# Patient Record
Sex: MALE | Race: WHITE | Employment: OTHER | URBAN - METROPOLITAN AREA
[De-identification: names, ages, dates, MRNs, and addresses within clinical notes are randomized per-mention and may not be internally consistent; named-entity substitution may affect disease eponyms.]

---

## 2017-01-16 ENCOUNTER — APPOINTMENT (OUTPATIENT)
Dept: GENERAL RADIOLOGY | Facility: CLINIC | Age: 82
DRG: 871 | End: 2017-01-16
Attending: EMERGENCY MEDICINE
Payer: MEDICARE

## 2017-01-16 ENCOUNTER — HOSPITAL ENCOUNTER (INPATIENT)
Facility: CLINIC | Age: 82
LOS: 5 days | Discharge: HOME | DRG: 871 | End: 2017-01-21
Attending: EMERGENCY MEDICINE | Admitting: INTERNAL MEDICINE
Payer: MEDICARE

## 2017-01-16 DIAGNOSIS — N39.0 URINARY TRACT INFECTION, SITE UNSPECIFIED: ICD-10-CM

## 2017-01-16 DIAGNOSIS — J18.9 PNEUMONIA OF RIGHT LOWER LOBE DUE TO INFECTIOUS ORGANISM: ICD-10-CM

## 2017-01-16 PROBLEM — R35.0 URINARY FREQUENCY: Status: ACTIVE | Noted: 2017-01-16
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
## 2017-01-16 LAB
ALBUMIN SERPL-MCNC: 2.9 G/DL (ref 3.4–5)
ALBUMIN UR-MCNC: 30 MG/DL
ALP SERPL-CCNC: 47 U/L (ref 40–150)
ALT SERPL W P-5'-P-CCNC: 14 U/L (ref 0–70)
ANION GAP SERPL CALCULATED.3IONS-SCNC: 10 MMOL/L (ref 3–14)
APPEARANCE UR: ABNORMAL
APTT PPP: 40 SEC (ref 22–37)
AST SERPL W P-5'-P-CCNC: 23 U/L (ref 0–45)
BACTERIA #/AREA URNS HPF: ABNORMAL /HPF
BASE DEFICIT BLDV-SCNC: 0.3 MMOL/L
BASOPHILS # BLD AUTO: 0 10E9/L (ref 0–0.2)
BASOPHILS NFR BLD AUTO: 0.2 %
BILIRUB SERPL-MCNC: 1.1 MG/DL (ref 0.2–1.3)
BILIRUB UR QL STRIP: NEGATIVE
BUN SERPL-MCNC: 32 MG/DL (ref 7–30)
CALCIUM SERPL-MCNC: 8.2 MG/DL (ref 8.5–10.1)
CHLORIDE SERPL-SCNC: 106 MMOL/L (ref 94–109)
CO2 BLD-SCNC: 22 MMOL/L (ref 21–28)
CO2 SERPL-SCNC: 24 MMOL/L (ref 20–32)
COLOR UR AUTO: YELLOW
CREAT SERPL-MCNC: 1.28 MG/DL (ref 0.66–1.25)
DIFFERENTIAL METHOD BLD: ABNORMAL
EOSINOPHIL # BLD AUTO: 0 10E9/L (ref 0–0.7)
EOSINOPHIL NFR BLD AUTO: 0.1 %
ERYTHROCYTE [DISTWIDTH] IN BLOOD BY AUTOMATED COUNT: 18.2 % (ref 10–15)
GFR SERPL CREATININE-BSD FRML MDRD: 54 ML/MIN/1.7M2
GLUCOSE SERPL-MCNC: 116 MG/DL (ref 70–99)
GLUCOSE UR STRIP-MCNC: NEGATIVE MG/DL
HCO3 BLDV-SCNC: 24 MMOL/L (ref 21–28)
HCT VFR BLD AUTO: 28.8 % (ref 40–53)
HGB BLD-MCNC: 9.3 G/DL (ref 13.3–17.7)
HGB UR QL STRIP: ABNORMAL
IMM GRANULOCYTES # BLD: 0 10E9/L (ref 0–0.4)
IMM GRANULOCYTES NFR BLD: 0.3 %
INR PPP: 1.25 (ref 0.86–1.14)
KETONES UR STRIP-MCNC: NEGATIVE MG/DL
LACTATE BLD-SCNC: 0.9 MMOL/L (ref 0.7–2.1)
LACTATE BLD-SCNC: 1.2 MMOL/L (ref 0.7–2.1)
LEUKOCYTE ESTERASE UR QL STRIP: ABNORMAL
LYMPHOCYTES # BLD AUTO: 2.5 10E9/L (ref 0.8–5.3)
LYMPHOCYTES NFR BLD AUTO: 17.8 %
MCH RBC QN AUTO: 31.7 PG (ref 26.5–33)
MCHC RBC AUTO-ENTMCNC: 32.3 G/DL (ref 31.5–36.5)
MCV RBC AUTO: 98 FL (ref 78–100)
MONOCYTES # BLD AUTO: 1.1 10E9/L (ref 0–1.3)
MONOCYTES NFR BLD AUTO: 7.5 %
MUCOUS THREADS #/AREA URNS LPF: PRESENT /LPF
NEUTROPHILS # BLD AUTO: 10.5 10E9/L (ref 1.6–8.3)
NEUTROPHILS NFR BLD AUTO: 74.1 %
NITRATE UR QL: POSITIVE
NRBC # BLD AUTO: 0 10*3/UL
NRBC BLD AUTO-RTO: 0 /100
O2/TOTAL GAS SETTING VFR VENT: ABNORMAL %
PCO2 BLD: 33 MM HG (ref 35–45)
PCO2 BLDV: 35 MM HG (ref 40–50)
PH BLD: 7.43 PH (ref 7.35–7.45)
PH BLDV: 7.44 PH (ref 7.32–7.43)
PH UR STRIP: 6.5 PH (ref 5–7)
PLATELET # BLD AUTO: 226 10E9/L (ref 150–450)
PO2 BLD: 58 MM HG (ref 80–105)
PO2 BLDV: 167 MM HG (ref 25–47)
POTASSIUM SERPL-SCNC: 4 MMOL/L (ref 3.4–5.3)
PROT SERPL-MCNC: 6.7 G/DL (ref 6.8–8.8)
RBC # BLD AUTO: 2.93 10E12/L (ref 4.4–5.9)
RBC #/AREA URNS AUTO: 6 /HPF (ref 0–2)
SAO2 % BLDA FROM PO2: 91 % (ref 92–100)
SODIUM SERPL-SCNC: 140 MMOL/L (ref 133–144)
SP GR UR STRIP: 1.02 (ref 1–1.03)
URN SPEC COLLECT METH UR: ABNORMAL
UROBILINOGEN UR STRIP-MCNC: NORMAL MG/DL (ref 0–2)
WBC # BLD AUTO: 14.1 10E9/L (ref 4–11)
WBC #/AREA URNS AUTO: >182 /HPF (ref 0–2)

## 2017-01-16 PROCEDURE — 82803 BLOOD GASES ANY COMBINATION: CPT | Performed by: EMERGENCY MEDICINE

## 2017-01-16 PROCEDURE — 87088 URINE BACTERIA CULTURE: CPT | Performed by: EMERGENCY MEDICINE

## 2017-01-16 PROCEDURE — 87186 SC STD MICRODIL/AGAR DIL: CPT | Performed by: EMERGENCY MEDICINE

## 2017-01-16 PROCEDURE — 80053 COMPREHEN METABOLIC PANEL: CPT | Performed by: EMERGENCY MEDICINE

## 2017-01-16 PROCEDURE — 87804 INFLUENZA ASSAY W/OPTIC: CPT | Performed by: EMERGENCY MEDICINE

## 2017-01-16 PROCEDURE — 96365 THER/PROPH/DIAG IV INF INIT: CPT

## 2017-01-16 PROCEDURE — 99285 EMERGENCY DEPT VISIT HI MDM: CPT | Mod: 25

## 2017-01-16 PROCEDURE — 96368 THER/DIAG CONCURRENT INF: CPT

## 2017-01-16 PROCEDURE — 25000125 ZZHC RX 250: Performed by: EMERGENCY MEDICINE

## 2017-01-16 PROCEDURE — 99223 1ST HOSP IP/OBS HIGH 75: CPT | Mod: AI | Performed by: INTERNAL MEDICINE

## 2017-01-16 PROCEDURE — 12000000 ZZH R&B MED SURG/OB

## 2017-01-16 PROCEDURE — 81001 URINALYSIS AUTO W/SCOPE: CPT | Performed by: EMERGENCY MEDICINE

## 2017-01-16 PROCEDURE — 96366 THER/PROPH/DIAG IV INF ADDON: CPT

## 2017-01-16 PROCEDURE — 85025 COMPLETE CBC W/AUTO DIFF WBC: CPT | Performed by: EMERGENCY MEDICINE

## 2017-01-16 PROCEDURE — 25000128 H RX IP 250 OP 636: Performed by: EMERGENCY MEDICINE

## 2017-01-16 PROCEDURE — 85730 THROMBOPLASTIN TIME PARTIAL: CPT | Performed by: EMERGENCY MEDICINE

## 2017-01-16 PROCEDURE — 82803 BLOOD GASES ANY COMBINATION: CPT

## 2017-01-16 PROCEDURE — 83605 ASSAY OF LACTIC ACID: CPT | Performed by: EMERGENCY MEDICINE

## 2017-01-16 PROCEDURE — 87086 URINE CULTURE/COLONY COUNT: CPT | Performed by: EMERGENCY MEDICINE

## 2017-01-16 PROCEDURE — 87040 BLOOD CULTURE FOR BACTERIA: CPT | Performed by: EMERGENCY MEDICINE

## 2017-01-16 PROCEDURE — 93005 ELECTROCARDIOGRAM TRACING: CPT

## 2017-01-16 PROCEDURE — 96367 TX/PROPH/DG ADDL SEQ IV INF: CPT

## 2017-01-16 PROCEDURE — 83605 ASSAY OF LACTIC ACID: CPT

## 2017-01-16 PROCEDURE — 71010 XR CHEST PORT 1 VW: CPT

## 2017-01-16 PROCEDURE — 36415 COLL VENOUS BLD VENIPUNCTURE: CPT | Performed by: EMERGENCY MEDICINE

## 2017-01-16 PROCEDURE — 85610 PROTHROMBIN TIME: CPT | Performed by: EMERGENCY MEDICINE

## 2017-01-16 RX ORDER — BISACODYL 10 MG
10 SUPPOSITORY, RECTAL RECTAL DAILY PRN
COMMUNITY
End: 2019-01-01

## 2017-01-16 RX ORDER — SODIUM CHLORIDE 9 MG/ML
1000 INJECTION, SOLUTION INTRAVENOUS CONTINUOUS
Status: DISCONTINUED | OUTPATIENT
Start: 2017-01-16 | End: 2017-01-17

## 2017-01-16 RX ORDER — LEVOFLOXACIN 5 MG/ML
750 INJECTION, SOLUTION INTRAVENOUS ONCE
Status: COMPLETED | OUTPATIENT
Start: 2017-01-17 | End: 2017-01-17

## 2017-01-16 RX ADMIN — TAZOBACTAM SODIUM AND PIPERACILLIN SODIUM 4.5 G: 500; 4 INJECTION, SOLUTION INTRAVENOUS at 23:37

## 2017-01-16 RX ADMIN — SODIUM CHLORIDE 1000 ML: 9 INJECTION, SOLUTION INTRAVENOUS at 23:37

## 2017-01-16 NOTE — IP AVS SNAPSHOT
MRN:[id number]                      After Visit Summary   1/16/2017    Eder Pearson    MRN: [id number]           Thank you!     Thank you for choosing [facility] for your care. Our goal is always to provide you with excellent care. Hearing back from our patients is one way we can continue to improve our services. Please take a few minutes to complete the written survey that you may receive in the mail after you visit. If you would like to speak to someone directly about your visit please contact Patient Relations at [phone]. Thank you!          Patient Information     Date Of Birth          7/9/1933        About your hospital stay     You were admitted on:  January 16, 2017 You last received care in the:  [address] [facility]    You were discharged on:  January 21, 2017        Reason for your hospital stay       You were admitted for urinary infection and likely pneumonia.  We have treated this with IV antibiotics and now you have two more days of oral antibiotics to complete this course.                  Who to Call     For medical emergencies, please call 911.  For non-urgent questions about your medical care, please call your primary care provider or clinic, None          Attending Provider     Provider    Any Dobbs MD Sebring, Daniel L, MD       Primary Care Provider Fax #    [facility] [phone]       [address] [place] [address]        After Care Instructions     Activity - Up with nursing assistance       Assist of 2 with use of assistive devices as needed.            Advance Diet as Tolerated       Follow this diet upon discharge: Orders Placed This Encounter  Combination Diet Regular Diet Adult            Discharge Instructions       Please give seroquel right AFTER meals, not before so as to prevent sedation.            Fall precautions           General info for SNF       Length of Stay Estimate:  "Long Term Care  Condition at Discharge: Stable  Level of care:skilled   Rehabilitation Potential: Poor  Admission H&P remains valid and up-to-date: Yes  Recent Chemotherapy: N/A  Use Nursing Home Standing Orders: Yes            Mantoux instructions       Give two-step Mantoux (PPD) Per Facility Policy Yes                  Follow-up Appointments     Follow Up and recommended labs and tests       Follow up with Nursing home physician for re-evaluation.                  Additional Services     Occupational Therapy Adult Consult       Evaluate and treat as clinically indicated.    Reason:  Deconditioning            Physical Therapy Adult Consult       Evaluate and treat as clinically indicated.    Reason:  Deconditioning                  Future tests that were ordered for you     Jodie Lift       DME Hydraulic Jodie Lift                  Pending Results     Date and Time Order Name Status Description    1/16/2017 2243 Blood culture Preliminary     1/16/2017 2236 Blood culture Preliminary             Statement of Approval     Ordered          01/21/17 1033  I have reviewed and agree with all the recommendations and orders detailed in this document.   EFFECTIVE NOW     Approved and electronically signed by:  Cortes Anna MD           01/20/17 6149  I have reviewed and agree with all the recommendations and orders detailed in this document.   EFFECTIVE NOW     Approved and electronically signed by:  Cortes Anna MD             Admission Information        Provider Department Dept Phone    1/16/2017 Rudy Grey MD  [facility] [phone]      Your Vitals Were     Blood Pressure Temperature Respirations    154/74 mmHg 98.2  F (36.8  C) (Oral) 18    Height Weight BMI (Body Mass Index)    1.727 m (5' 8\") 85.957 kg (189 lb 8 oz) 28.82 kg/m2    Pulse Oximetry          97%        MyChart Information     [facility] lets you send messages to your doctor, view your test results, renew " "your prescriptions, schedule appointments and more. To sign up, go to [email or web address] . Click on \"Log in\" on the left side of the screen, which will take you to the Welcome page. Then click on \"Sign up Now\" on the right side of the page.     You will be asked to enter the access code listed below, as well as some personal information. Please follow the directions to create your username and password.     Your access code is: [email or web address]  Expires: 3/4/2017 10:57 PM     Your access code will  in 90 days. If you need help or a new code, please call your [facility] clinic or [phone].        Care EveryWhere ID     This is your Care EveryWhere ID. This could be used by other organizations to access your [facility] medical records  [id number]           Review of your medicines      START taking        Dose / Directions    levofloxacin 500 MG tablet   Commonly known as:  LEVAQUIN   Used for:  Urinary tract infection, site unspecified, Pneumonia of right lower lobe due to infectious organism        Dose:  500 mg   Start taking on:  2017   Take 1 tablet (500 mg) by mouth daily for 2 days   Quantity:  2 tablet   Refills:  0         CONTINUE these medicines which have NOT CHANGED        Dose / Directions    * acetaminophen 500 MG tablet   Commonly known as:  TYLENOL        Dose:  1000 mg   Take 1,000 mg by mouth 2 times daily   Refills:  0       * acetaminophen 500 MG tablet   Commonly known as:  TYLENOL        Dose:  1000 mg   Take 1,000 mg by mouth daily as needed for mild pain   Refills:  0       bisacodyl 10 MG Suppository   Commonly known as:  DULCOLAX        Dose:  10 mg   Place 10 mg rectally daily as needed for constipation   Refills:  0       DULoxetine 30 MG EC capsule   Commonly known as:  CYMBALTA        Dose:  30 mg   Take 30 mg by mouth daily   Refills:  0       FLOMAX 0.4 MG capsule   Generic drug:  tamsulosin        Dose:  0.4 mg   Take 0.4 mg by mouth At Bedtime   Refills:  0       " menthol-zinc oxide 0.44-20.625 % Oint ointment   Commonly known as:  CALMOSEPTINE        Apply topically every 8 hours as needed for skin protection   Refills:  0       miconazole with skin protectant 2 % Crea cream        Apply topically every 8 hours Apply to buttocks topically every shift for barrier cream with each toileting   Refills:  0       NAMENDA PO        Dose:  10 mg   Take 10 mg by mouth daily   Refills:  0       NATURAL FIBER PO        Dose:  1 tsp.   Take 1 tsp. by mouth daily   Refills:  0       phenylephrine-shark liver oil-mineral oil-petrolatum 0.25-14-74.9 % rectal ointment   Commonly known as:  PREPARATION H        Place rectally daily as needed for hemorrhoids   Refills:  0       * QUETIAPINE FUMARATE PO        Dose:  12.5 mg   Take 12.5 mg by mouth daily (with lunch)   Refills:  0       * SEROQUEL 25 MG tablet   Generic drug:  QUEtiapine        Dose:  12.5 mg   Take 12.5 mg by mouth daily (with dinner)   Refills:  0       * SEROQUEL 25 MG tablet   Generic drug:  QUEtiapine        Dose:  25 mg   Take 25 mg by mouth At Bedtime   Refills:  0       * SEROQUEL 25 MG tablet   Generic drug:  QUEtiapine        Dose:  25 mg   Take 25 mg by mouth every morning   Refills:  0       * SEROQUEL 25 MG tablet   Generic drug:  QUEtiapine        Dose:  12.5 mg   Take 12.5 mg by mouth every 12 hours as needed   Refills:  0       senna-docusate 8.6-50 MG per tablet   Commonly known as:  SENOKOT-S;PERICOLACE        Dose:  1 tablet   Take 1 tablet by mouth every 12 hours as needed for constipation   Refills:  0       * sodium chloride 0.65 % nasal spray   Commonly known as:  OCEAN        Dose:  2 spray   Spray 2 sprays into both nostrils 2 times daily   Refills:  0       * sodium chloride 0.65 % nasal spray   Commonly known as:  OCEAN        Dose:  2 spray   Spray 2 sprays into both nostrils every 8 hours as needed for congestion   Refills:  0       VITAMIN B 12 PO        Dose:  1000 mcg   Take 1,000 mcg by mouth  daily   Refills:  0       VITAMIN D3 PO        Dose:  5000 Units   Take 5,000 Units by mouth daily   Refills:  0       * Notice:  This list has 9 medication(s) that are the same as other medications prescribed for you. Read the directions carefully, and ask your doctor or other care provider to review them with you.         Where to get your medicines      Some of these will need a paper prescription and others can be bought over the counter. Ask your nurse if you have questions.     Bring a paper prescription for each of these medications    - levofloxacin 500 MG tablet             Protect others around you: Learn how to safely use, store and throw away your medicines at www.disposemymeds.org.             Medication List: This is a list of all your medications and when to take them. Check marks below indicate your daily home schedule. Keep this list as a reference.      Medications           Morning Afternoon Evening Bedtime As Needed    * acetaminophen 500 MG tablet   Commonly known as:  TYLENOL   Take 1,000 mg by mouth 2 times daily   Last time this was given:  1,000 mg on 1/21/2017  9:26 AM                                * acetaminophen 500 MG tablet   Commonly known as:  TYLENOL   Take 1,000 mg by mouth daily as needed for mild pain   Last time this was given:  1,000 mg on 1/21/2017  9:26 AM                                bisacodyl 10 MG Suppository   Commonly known as:  DULCOLAX   Place 10 mg rectally daily as needed for constipation                                DULoxetine 30 MG EC capsule   Commonly known as:  CYMBALTA   Take 30 mg by mouth daily   Last time this was given:  30 mg on 1/21/2017  9:27 AM                                FLOMAX 0.4 MG capsule   Take 0.4 mg by mouth At Bedtime   Last time this was given:  0.4 mg on 1/20/2017  9:12 PM   Generic drug:  tamsulosin                                levofloxacin 500 MG tablet   Commonly known as:  LEVAQUIN   Take 1 tablet (500 mg) by mouth daily for 2  days   Start taking on:  1/22/2017                                menthol-zinc oxide 0.44-20.625 % Oint ointment   Commonly known as:  CALMOSEPTINE   Apply topically every 8 hours as needed for skin protection                                miconazole with skin protectant 2 % Crea cream   Apply topically every 8 hours Apply to buttocks topically every shift for barrier cream with each toileting                                NAMENDA PO   Take 10 mg by mouth daily   Last time this was given:  10 mg on 1/21/2017  9:26 AM                                NATURAL FIBER PO   Take 1 tsp. by mouth daily                                phenylephrine-shark liver oil-mineral oil-petrolatum 0.25-14-74.9 % rectal ointment   Commonly known as:  PREPARATION H   Place rectally daily as needed for hemorrhoids                                * QUETIAPINE FUMARATE PO   Take 12.5 mg by mouth daily (with lunch)   Last time this was given:  25 mg on 1/21/2017  9:26 AM                                * SEROQUEL 25 MG tablet   Take 12.5 mg by mouth daily (with dinner)   Last time this was given:  25 mg on 1/21/2017  9:26 AM   Generic drug:  QUEtiapine                                * SEROQUEL 25 MG tablet   Take 25 mg by mouth At Bedtime   Last time this was given:  25 mg on 1/21/2017  9:26 AM   Generic drug:  QUEtiapine                                * SEROQUEL 25 MG tablet   Take 25 mg by mouth every morning   Last time this was given:  25 mg on 1/21/2017  9:26 AM   Generic drug:  QUEtiapine                                * SEROQUEL 25 MG tablet   Take 12.5 mg by mouth every 12 hours as needed   Last time this was given:  25 mg on 1/21/2017  9:26 AM   Generic drug:  QUEtiapine                                senna-docusate 8.6-50 MG per tablet   Commonly known as:  SENOKOT-S;PERICOLACE   Take 1 tablet by mouth every 12 hours as needed for constipation                                * sodium chloride 0.65 % nasal spray   Commonly known as:   OCEAN   Spray 2 sprays into both nostrils 2 times daily   Last time this was given:  2 sprays on 1/21/2017 10:00 AM                                * sodium chloride 0.65 % nasal spray   Commonly known as:  OCEAN   Spray 2 sprays into both nostrils every 8 hours as needed for congestion   Last time this was given:  2 sprays on 1/21/2017 10:00 AM                                VITAMIN B 12 PO   Take 1,000 mcg by mouth daily                                VITAMIN D3 PO   Take 5,000 Units by mouth daily   Last time this was given:  5,000 Units on 1/21/2017  9:26 AM                                * Notice:  This list has 9 medication(s) that are the same as other medications prescribed for you. Read the directions carefully, and ask your doctor or other care provider to review them with you.

## 2017-01-16 NOTE — IP AVS SNAPSHOT
[facility] [address] [facility]    [address]    [facility] [address]    Phone:  [phone]    Fax:  [phone]                                       After Visit Summary   1/16/2017    Eder Pearson    MRN: [id number]           After Visit Summary Signature Page     I have received my discharge instructions, and my questions have been answered. I have discussed any challenges I see with this plan with the nurse or doctor.    ..........................................................................................................................................  Patient/Patient Representative Signature      ..........................................................................................................................................  Patient Representative Print Name and Relationship to Patient    ..................................................               ................................................  Date                                            Time    ..........................................................................................................................................  Reviewed by Signature/Title    ...................................................              ..............................................  Date                                                            Time

## 2017-01-16 NOTE — IP AVS SNAPSHOT
` `           [place] [address] MEDICAL SURGICAL: [phone]                 INTERAGENCY TRANSFER FORM - NOTES (H&P, Discharge Summary, Consults, Procedures, Therapies)   2017                    Hospital Admission Date: 2017  CALEB PEARSON   : 1933  Sex: Male        Patient PCP Information     Provider PCP Type    [facility]         History & Physicals      H&P by Rudy Grey MD at 2017 11:45 PM     Author:  Rudy Grey MD Service:  Hospitalist Author Type:  Physician    Filed:  2017 12:18 AM Note Time:  2017 11:45 PM Status:  Addendum    :  Rudy Grey MD (Physician)      Related Notes: Original Note by Rudy Grey MD (Physician) filed at 2017 12:17 AM         [facility]  History and Physical   Hospitalist Service    Rudy Grey MD    Caleb Pearson MRN# [id number]   YOB: 1933 Age: 83 year old      Date of Admission:  2017           Assessment and Plan:   Patient is an 83-year-old male with history of dementia, anxiety, prostate cancer, and benign prostatic hypertrophy.  He was sent to the emergency department by ambulance from a [facility] for evaluation of fever to 102 degrees, cough, and wheezing.  Emergency department evaluation suggested right upper lobe pneumonia, urinary tract infection, and sepsis.    Problem list:  1. Sepsis due to right upper lobe pneumonia and/or urinary tract infection.  Caleb was started on Levaquin, Zosyn, and vancomycin for healthcare associated pneumonia in the emergency department. This will be continued.  This should cover pneumonia as well as urinary tract infection. Follow cultures.  Unless cultures dictate otherwise, Zosyn and vancomycin could likely be discontinued in the next 24-48 hours.    2.  Right upper lobe pneumonia. This is health care associated.  See discussion above.    3.  Urinary tract infection.  See discussion  above.    4.  Dementia.  Continue medications for dementia, agitation, and anxiety (Donezepil, Namenda, and Seroquel).    5.  Benign prostatic hypertrophy.  Continue Flomax.    Full code as per memory care orders.  Lovenox for DVT prophylaxis  Disposition.  Admit as inpatient           Code Status:   Full Code         Primary Care Physician:   Janae Jauregui Physician None         Chief Complaint:   Cough, wheezing, and fever    History is obtained from Dr. Dobbs and the medical record.         History of Present Illness:   Eder Pearson is an 83-year-old male with history of dementia, anxiety, prostate cancer, and benign prostatic hypertrophy.  He was sent to the emergency department by ambulance from a [facility] for evaluation of fever to 102 degrees, cough, and wheezing.  Emergency department evaluation Showed a temperature of 100.7.  Temperature had been 102 at [facility]. Oxygen saturations were 89% on room air.  Laboratory evaluation showed leukocytosis with white blood cell count of 14.1.  Lactic acid level was normal.  BUN and creatinine were 32 and 1.28 respectively, which seems to be his baseline.  Liver function tests were normal. Urinalysis showed greater than 182 white blood cells per high-power field, positive nitrites, and large leukocyte esterase.  Blood cultures and urine cultures are pending. CXR suggested right upper lobe pneumonia.  Eder was not able to provide much history because of his dementia.  I was asked to admit him to the hospital for treatment of sepsis due to healthcare associated pneumonia and urinary tract infection.           Past Medical History:     Patient Active Problem List   Diagnosis     Urinary frequency     HAP (hospital-acquired pneumonia)      Past Medical History   Diagnosis Date     Anxiety      Prostate cancer (H)      Dementia              Past Surgical History:   History reviewed. No pertinent past surgical history.         Home Medications:     Prior  "to Admission medications    Medication Sig Last Dose Taking? Auth Provider   QUETIAPINE FUMARATE PO Take 12.5 mg by mouth 3 times daily  Yes Reported, Patient   Cyanocobalamin (VITAMIN B 12 PO) Take 1,000 mcg by mouth  Yes Reported, Patient   bisacodyl (DULCOLAX) 10 MG Suppository Place 10 mg rectally daily as needed for constipation Past Week at Unknown time Yes Reported, Patient   Donepezil HCl (ARICEPT PO) Take 10 mg by mouth At Bedtime  1/16/2017 at Unknown time Yes Reported, Patient   Cholecalciferol (VITAMIN D3 PO) Take 5,000 Units by mouth daily 1/16/2017 at Unknown time Yes Reported, Patient   TAMSULOSIN HCL PO Take 0.4 mg by mouth 2 times daily 1/16/2017 at Unknown time Yes Reported, Patient   Memantine HCl (NAMENDA PO) Take 10 mg by mouth daily 1/16/2017 at Unknown time Yes Reported, Patient   RISPERIDONE M-TAB PO    Reported, Patient            Allergies:   No Known Allergies         Social History:     Social History   Substance Use Topics     Smoking status: Never Smoker      Smokeless tobacco: Not on file     Alcohol Use: No             Family History:   Patient is unable to provide family history due to dementia           Review of Systems:   See history above.  Patient is unable to provide additional review of systems due to dementia.           Physical Exam:   Blood pressure 126/54, temperature 100.7  F (38.2  C), temperature source Rectal, height 1.727 m (5' 8\"), weight 90.719 kg (200 lb), SpO2 96 %.  200 lbs 0 oz      GENERAL: sleeping but easily awakened.  Unable to really answer questions.. No acute distress.  EYES: Pupils equal and round. No scleral erythema or icterus.  ENT: External ears are normal without deformity. Posterior oropharynx is without erythem, swelling, or exudate.  NECK: Supple. No masses or swelling. No tenderness. Thyroid is normal without mass or tenderness.  CHEST: Clear to auscultation. Normal breath sounds. No retractions.   CV: Regular rate and rhythm. No JVD. Pulses " normal.  ABDOMEN: Bowel sounds present. No tenderness. No masses or hernia.  EXTREMETIES: No clubbing, cyanosis, or ischemia.  SKIN: Warm and dry to touch. No wounds or rashes.  NEUROLOGIC: Strength and sensation are normal. Deep tendon reflexes are normal. Cranial nerves are normal.             Data:   All new lab and imaging data was reviewed.     Results for orders placed or performed during the hospital encounter of 01/16/17 (from the past 24 hour(s))   CBC with platelets differential   Result Value Ref Range    WBC 14.1 (H) 4.0 - 11.0 10e9/L    RBC Count 2.93 (L) 4.4 - 5.9 10e12/L    Hemoglobin 9.3 (L) 13.3 - 17.7 g/dL    Hematocrit 28.8 (L) 40.0 - 53.0 %    MCV 98 78 - 100 fl    MCH 31.7 26.5 - 33.0 pg    MCHC 32.3 31.5 - 36.5 g/dL    RDW 18.2 (H) 10.0 - 15.0 %    Platelet Count 226 150 - 450 10e9/L    Diff Method Automated Method     % Neutrophils 74.1 %    % Lymphocytes 17.8 %    % Monocytes 7.5 %    % Eosinophils 0.1 %    % Basophils 0.2 %    % Immature Granulocytes 0.3 %    Nucleated RBCs 0 0 /100    Absolute Neutrophil 10.5 (H) 1.6 - 8.3 10e9/L    Absolute Lymphocytes 2.5 0.8 - 5.3 10e9/L    Absolute Monocytes 1.1 0.0 - 1.3 10e9/L    Absolute Eosinophils 0.0 0.0 - 0.7 10e9/L    Absolute Basophils 0.0 0.0 - 0.2 10e9/L    Abs Immature Granulocytes 0.0 0 - 0.4 10e9/L    Absolute Nucleated RBC 0.0    Comprehensive metabolic panel   Result Value Ref Range    Sodium 140 133 - 144 mmol/L    Potassium 4.0 3.4 - 5.3 mmol/L    Chloride 106 94 - 109 mmol/L    Carbon Dioxide 24 20 - 32 mmol/L    Anion Gap 10 3 - 14 mmol/L    Glucose 116 (H) 70 - 99 mg/dL    Urea Nitrogen 32 (H) 7 - 30 mg/dL    Creatinine 1.28 (H) 0.66 - 1.25 mg/dL    GFR Estimate 54 (L) >60 mL/min/1.7m2    GFR Estimate If Black 65 >60 mL/min/1.7m2    Calcium 8.2 (L) 8.5 - 10.1 mg/dL    Bilirubin Total 1.1 0.2 - 1.3 mg/dL    Albumin 2.9 (L) 3.4 - 5.0 g/dL    Protein Total 6.7 (L) 6.8 - 8.8 g/dL    Alkaline Phosphatase 47 40 - 150 U/L    ALT 14 0 - 70  U/L    AST 23 0 - 45 U/L   Lactic acid whole blood   Result Value Ref Range    Lactic Acid 1.2 0.7 - 2.1 mmol/L   Blood culture   Result Value Ref Range    Specimen Description Blood Right Hand     Culture Micro No growth after 1 hour     Micro Report Status Pending    INR   Result Value Ref Range    INR 1.25 (H) 0.86 - 1.14   Partial thromboplastin time   Result Value Ref Range    PTT 40 (H) 22 - 37 sec   Influenza A/B antigen   Result Value Ref Range    Influenza A/B Agn Specimen Nasal     Influenza A Negative NEG    Influenza B  NEG     Negative   Test results must be correlated with clinical data. If necessary, results   should be confirmed by a molecular assay or viral culture.     ISTAT gases lactate art POCT   Result Value Ref Range    pH Arterial 7.43 7.35 - 7.45 pH    pCO2 Arterial 33 (L) 35 - 45 mm Hg    pO2 Arterial 58 (L) 80 - 105 mm Hg    Bicarbonate Arterial 22 21 - 28 mmol/L    O2 Sat Arterial 91 (L) 92 - 100 %    Lactic Acid 0.9 0.7 - 2.1 mmol/L   Blood gas venous   Result Value Ref Range    Ph Venous 7.44 (H) 7.32 - 7.43 pH    PCO2 Venous 35 (L) 40 - 50 mm Hg    PO2 Venous 167 (H) 25 - 47 mm Hg    Bicarbonate Venous 24 21 - 28 mmol/L    Base Deficit Venous 0.3 mmol/L    FIO2 NC  2 L      UA with Microscopic   Result Value Ref Range    Color Urine Yellow     Appearance Urine Slightly Cloudy     Glucose Urine Negative NEG mg/dL    Bilirubin Urine Negative NEG    Ketones Urine Negative NEG mg/dL    Specific Gravity Urine 1.019 1.003 - 1.035    Blood Urine Small (A) NEG    pH Urine 6.5 5.0 - 7.0 pH    Protein Albumin Urine 30 (A) NEG mg/dL    Urobilinogen mg/dL Normal 0.0 - 2.0 mg/dL    Nitrite Urine Positive (A) NEG    Leukocyte Esterase Urine Large (A) NEG    Source Catheterized Urine     WBC Urine >182 (H) 0 - 2 /HPF    RBC Urine 6 (H) 0 - 2 /HPF    Bacteria Urine Many (A) NEG /HPF    Mucous Urine Present (A) NEG /LPF   XR Chest Port 1 View    Narrative    XR CHEST PORT 1 VW  1/16/2017 11:07 PM       HISTORY: Fever.     COMPARISON: None.    FINDINGS: Supine portable chest. The heart size is normal. Thoracic  aorta is calcified. There is a small infiltrate in the right upper  lobe inferiorly. The lungs are otherwise clear. No pneumothorax.      Impression    IMPRESSION: Small right upper lobe infiltrate may be pneumonia.                      Discharge Summaries      Discharge Summaries by Cortes Anna MD at 1/21/2017 10:27 AM     Author:  Cortes Anna MD Service:  Hospitalist Author Type:  Physician    Filed:  1/21/2017 10:32 AM Note Time:  1/21/2017 10:27 AM Status:  Addendum    :  Cortes Anna MD (Physician)      Related Notes: Original Note by Cortes Anna MD (Physician) filed at 1/20/2017 11:38 AM         [facility]  Discharge Summary  Name: Eder Pearson    MRN: [id number]  YOB: 1933    Age: 83 year old  Date of Discharge:  01/21/2017  Date of Admission: 1/16/2017  Primary Care Provider: Janae Jauregui Physician  Discharge Physician:  Sadi Anna MD  Discharging Service:  Hospitalist      Hospital/Discharge Diagnoses:  Eder Pearson is a 83 year old male w/ hx of dementia, anxiety, CKD, and prostate cancer admitted from his memory care unit on 1/16/2017 with cough and fevers.  Here workup was notable for fever and leukocytosis with Xray concerning for pneumonia and pyuria.  He was admitted for further care and initiated on broad spectrum abx with vancomycin, zosyn, and levofloxacin.  He was quite somnolent on admission likely from sepsis but this has resolved.  Urine culture was found to be positive for Proteus and antibiotics have subsequently been tailored to this pathogen: Discontinued vancomycin 1/18 and zosyn on 1/19 with continuation of sole levaquin.  SLP was consulted for concern for aspiration, but does ok with all textures as long as he is alert.  Though no sputum culture  could be obtained I am still suspicious for pneumonia given productive sounding cough but hypoxia/fevers/leukocytosis have resolved so I feel this has been adequately treated with antibiotics given for UTI.  He has been afebrile the past two days here with his primary remaining issue being his dementia with intermittent delirium as expected and some loss of mobility related to his acute illness and deconditioning.  Prior to this admission he was an assist of two but here he has required intermittent use of a lift which we have prescribed for him and had delivered to his care facility for use as needed.   He will discharge back to his memory care facility today with two more days of levaquin.    I did readdress goals of care with his daughter, Lisa to help define DNR/DNI and the dramatic different between this and full comfort-care status.  She will readdress this further with family but for now he remains FULL CODE.      Sepsis due to HCAP from possible GNRs and possible UTI: fevers, leukoctyosis, and cough with RUL infiltrate upon presentation, clinically with sepsis which has resolved.   Apparently multiple exposures to RSV positive kids and family recently which could be playing a role as well.  Some concern for aspiration given decreased LOC though doing OK w/ diet now.  Treated as HCAP as he lives in a memory care unit though now have tailored abx to sole levaquin.  UA with pyuria now growing proteus.  Discontinued vancomycin as no data positive for MRSA.  -continue levofloxacin.  Can transition to oral.  Provide 7 total days abx including doses here.    Dementia with behavioral issues: lives in memory care unit.  Has some behavioral issues at baseline.  Alternating with somnolence and alertness here.  Has had some agitation.  -Continue pta medications including cymbalta, namenda, and seroquel  -would make note to give seroquel after meals instead of before so he is not too sedated for eating and decrease the  risk of aspiration  -per daughter he does very well with seroquel prn for agitation and would prefer using this to haldol if able.  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  Using a lift intermittently here.  Discussed w/ care coordinator to ensure this is OK w/ his facility prior to DC.  We have ordered a lift and reportedly it has been delivered already.        Discharge Disposition:  Discharged to nursing home     Allergies:  No Known Allergies     Discharge Medications:   Current Discharge Medication List      START taking these medications    Details   levofloxacin (LEVAQUIN) 500 MG tablet Take 1 tablet (500 mg) by mouth daily for 2 days  Qty: 2 tablet, Refills: 0    Associated Diagnoses: Urinary tract infection, site unspecified; Pneumonia of right lower lobe due to infectious organism         CONTINUE these medications which have NOT CHANGED    Details   tamsulosin (FLOMAX) 0.4 MG capsule Take 0.4 mg by mouth At Bedtime      !! QUEtiapine (SEROQUEL) 25 MG tablet Take 12.5 mg by mouth daily (with dinner)      DULoxetine (CYMBALTA) 30 MG EC capsule Take 30 mg by mouth daily      Psyllium (NATURAL FIBER PO) Take 1 tsp. by mouth daily      !! QUEtiapine (SEROQUEL) 25 MG tablet Take 25 mg by mouth At Bedtime      !! QUEtiapine (SEROQUEL) 25 MG tablet Take 25 mg by mouth every morning      !! sodium chloride (OCEAN) 0.65 % nasal spray Spray 2 sprays into both nostrils 2 times daily      !! acetaminophen (TYLENOL) 500 MG tablet Take 1,000 mg by mouth 2 times daily      miconazole with skin protectant (LOPEZ ANTIFUNGAL) 2 % CREA cream Apply topically every 8 hours Apply to buttocks topically every shift for barrier cream with each toileting      !! QUEtiapine (SEROQUEL) 25 MG tablet Take 12.5 mg by mouth  "every 12 hours as needed      !! sodium chloride (OCEAN) 0.65 % nasal spray Spray 2 sprays into both nostrils every 8 hours as needed for congestion      senna-docusate (SENOKOT-S;PERICOLACE) 8.6-50 MG per tablet Take 1 tablet by mouth every 12 hours as needed for constipation      !! acetaminophen (TYLENOL) 500 MG tablet Take 1,000 mg by mouth daily as needed for mild pain      phenylephrine-shark liver oil-mineral oil-petrolatum (PREPARATION H) 0.25-14-74.9 % rectal ointment Place rectally daily as needed for hemorrhoids      menthol-zinc oxide (CALMOSEPTINE) 0.44-20.625 % OINT ointment Apply topically every 8 hours as needed for skin protection      !! QUETIAPINE FUMARATE PO Take 12.5 mg by mouth daily (with lunch)       Cyanocobalamin (VITAMIN B 12 PO) Take 1,000 mcg by mouth daily       bisacodyl (DULCOLAX) 10 MG Suppository Place 10 mg rectally daily as needed for constipation      Cholecalciferol (VITAMIN D3 PO) Take 5,000 Units by mouth daily      Memantine HCl (NAMENDA PO) Take 10 mg by mouth daily       !! - Potential duplicate medications found. Please discuss with provider.           Condition on Discharge:  Discharge condition: Stable   Discharge vitals: Blood pressure 148/86, temperature 97.3  F (36.3  C), temperature source Axillary, resp. rate 18, height 1.727 m (5' 8\"), weight 85.957 kg (189 lb 8 oz), SpO2 96 %.   Code status on discharge: FULL CODE. Family is considering changing code status but is not ready yet and plan to address this further.     History of Illness:  See detailed admission note for full details.    Physical Exam:  Blood pressure 148/86, temperature 97.3  F (36.3  C), temperature source Axillary, resp. rate 18, height 1.727 m (5' 8\"), weight 85.957 kg (189 lb 8 oz), SpO2 96 %.  Wt Readings from Last 1 Encounters:   01/20/17 85.957 kg (189 lb 8 oz)     General: Alert, awake, no acute distress.  Elderly frail man, up in chair.  HEENT: NC/AT, eyes anicteric, external occular " movements intact, face symmetric.   Pulmonary: Normal chest rise, normal work of breathing.  Lungs CTA BL  Abdomen: soft, non-tender, non-distended.  Bowel Sounds Present.  No guarding.  Extremities: no deformities.  Warm, well perfused.  Skin: no rashes or lesions noted.  Warm and Dry.  Neuro: pleasantly demented, awake, makes needs known but not oriented to place or events.  No focal deficits noted. Coordination and strength grossly normal.  Psych: Appropriate affect.    Procedures other than Imaging:  None.     Imaging:  No results found for this or any previous visit (from the past 48 hour(s)).     Consultations:  No consultations were requested during this admission.       Recent Lab Results:    Recent Labs  Lab 01/20/17  0655 01/19/17  0534 01/18/17  0809   WBC 6.0 5.4 6.4   HGB 9.7* 8.5* 8.6*   HCT 30.8* 27.1* 27.3*    99 100    227 176          NA      144   1/19/2017  NA      143   1/18/2017  NA      141   1/17/2017 CHLORIDE      110   1/19/2017  CHLORIDE      109   1/18/2017  CHLORIDE      108   1/17/2017 BUN       16   1/19/2017  BUN       17   1/18/2017  BUN       28   1/17/2017   POTASSIUM      3.9   1/20/2017  POTASSIUM      3.5   1/19/2017  POTASSIUM      4.1   1/18/2017 CO2       27   1/19/2017  CO2       26   1/18/2017  CO2       24   1/17/2017 CR     1.00   1/20/2017  CR     1.15   1/19/2017  CR     1.20   1/18/2017          Pending Results:    Unresulted Labs Ordered in the Past 30 Days of this Admission     Date and Time Order Name Status Description    1/16/2017 2243 Blood culture Preliminary     1/16/2017 2236 Blood culture Preliminary            Discharge Instructions and Follow-Up:     Discharge Procedure Orders  General info for SNF   Order Comments: Length of Stay Estimate: Long Term Care  Condition at Discharge: Stable  Level of care:skilled   Rehabilitation Potential: Poor  Admission H&P remains valid and up-to-date: Yes  Recent Chemotherapy: N/A  Use Nursing Home Standing  Orders: Yes     Mantoux instructions   Order Comments: Give two-step Mantoux (PPD) Per Facility Policy Yes     Follow Up and recommended labs and tests   Order Comments: Follow up with Nursing home physician for re-evaluation.     Reason for your hospital stay   Order Comments: You were admitted for urinary infection and likely pneumonia.  We have treated this with IV antibiotics and now you have two more days of oral antibiotics to complete this course.     Activity - Up with nursing assistance   Order Comments: Assist of 2 with use of assistive devices as needed.   Order Specific Question Answer Comments   Is discharge order? Yes      Discharge Instructions   Order Comments: Please give seroquel right AFTER meals, not before so as to prevent sedation.     Full Code     Physical Therapy Adult Consult   Order Comments: Evaluate and treat as clinically indicated.    Reason:  Deconditioning     Occupational Therapy Adult Consult   Order Comments: Evaluate and treat as clinically indicated.    Reason:  Deconditioning     Fall precautions     Advance Diet as Tolerated   Order Comments: Follow this diet upon discharge: Orders Placed This Encounter  Combination Diet Regular Diet Adult   Order Specific Question Answer Comments   Is discharge order? Yes          Total time spent in face to face contact with the patient and coordinating discharge was:  60 Minutes.                        Consult Notes      Consults by Gerda Dooley CM at 1/17/2017 11:53 AM     Author:  Gerda Dooley CM Service:  Care Coordinator Author Type:      Filed:  1/17/2017 12:02 PM Note Time:  1/17/2017 11:53 AM Status:  Addendum    :  Gerda Dooley CM ()      Related Notes: Original Note by Gerda Dooley CM () filed at 1/17/2017 12:01 PM     Consult Orders:    1. Care Coordinator IP Consult [[id number]] ordered by Nahid Arreola MD at 01/17/17 1148                Care Transition Initial  Assessment - RN    Reason For Consult: care coordination/care conference, discharge planning   Met with: Patient and Family.    DATA   Active Problems:    Urinary frequency    HAP (hospital-acquired pneumonia)     PNA Action plan given.     Cognitive Status: confused.  Primary Care Clinic Name: [facility]     Contact information and PCP information verified: Yes    Lives With: facility resident  Living Arrangements: assisted living     Description of Support System: Supportive, Involved   Who is your support system?: Facility resident(s)/Staff         Insurance concerns: No Insurance issues identified    ASSESSMENT  Patient currently receives the following services:  All. Assistance with dressing, incontinence care, med administration, all meals. Assist of 2 for transfers when ill.         Identified issues/concerns regarding health management: No issues raised. Family requests wheelchair van transport at discharge.     PLAN  Patient anticipates discharging back to [facility].      Patient anticipates needs for home equipment: No. Uses a 2ww, has wheelchair available if needed.     Plan/Disposition: Memory Care     Appointments: None set up. Patient is followed by [facility].     CM will continue to follow patient until discharge for any additional needs.     Kath Dooley RN, BSN, CTS  [facility]  [phone]                        Progress Notes - Physician (Notes from 01/18/17 through 01/21/17)      Progress Notes by Gerda Dooley CM at 1/21/2017  7:20 AM     Author:  Gerda Dooley CM Service:  Care Coordinator Author Type:      Filed:  1/21/2017  7:21 AM Note Time:  1/21/2017  7:20 AM Status:  Signed    :  Gerda Dooley CM ()           Contacted [facility] this AM to verify delivery of osvaldo lift. Spoke with staff - lift delivered last evening. HE to transport at 1230pm today. Updated chg RN, staff, family.     CM will  continue to follow patient until discharge for any additional needs.     Kath Dooley RN, BSN, [facility]  [phone]         Progress Notes by Gerda Dooley CM at 1/20/2017  3:31 PM     Author:  Gerda Dooley CM Service:  Care Coordinator Author Type:      Filed:  1/20/2017  3:34 PM Note Time:  1/20/2017  3:31 PM Status:  Signed    :  Gerda Dooley CM ()           Obtained prescription from Dr. Anna for osvaldo lift. Faxed ([phone]) to [facility]. They have received it. Their staff will see if they can deliver lift this afternoon/evening. If not, they will deliver Monday. Patient will discharge when lift is at facility. Updated bedside RN, chg RN and family. DaughterLisa, spoke with Heydi at [facility]. They are agreeable to these arrangements. Pt can dc over weekend if equipment is present.     CM will continue to follow patient until discharge for any additional needs.     Kath Dooley RN, BSN, [facility]  [phone]             Progress Notes by Gerda Dooley CM at 1/20/2017 10:36 AM     Author:  Gerda Dooley CM Service:  Care Coordinator Author Type:      Filed:  1/20/2017  2:35 PM Note Time:  1/20/2017 10:36 AM Status:  Addendum    :  Gerda Dooley CM ()      Related Notes: Original Note by Gerda Dooley CM () filed at 1/20/2017 10:42 AM         Patient ready to discharge back to [facility]. Spoke with daughter, Lisa, via phone ([phone]). Family has already had an informational meeting with [facility]. They are not ready to sign up for hospice care yet. Lisa knows their resources for hospice; she has contact information for when they are ready to initiate care.     Bedside staff used a lift with patient this AM. Contacted [facility] to verify they are able to take him w/lift needs. LM for Heydi RUIZ at [facility] ([phone]).     CM will continue to follow patient until discharge for any additional needs.     Kath Dooley RN, BSN, CTS  [facility]  [phone]     Addendum 1400 - Spoke with Heydi at [facility]. They are NOT able to take Adrian back until a lift is delivered. Updated Dr. Anna. He cancelled discharge for today. He will right a prescription for a Jodie hydraulic lift. Daughter, Lisa, is going to contact medical equipment company to make arrangements for payment & delivery. Discussed importance of asking about weekend delivery. CM will update MD, staff and Daniele when arrangements in place.    ds       Progress Notes by Cortes Anna MD at 1/20/2017  2:29 PM     Author:  Cortes Anna MD Service:  Hospitalist Author Type:  Physician    Filed:  1/20/2017  2:33 PM Note Time:  1/20/2017  2:29 PM Status:  Signed    :  Cortes Anna MD (Physician)           [facility]  Hospitalist Progress Note  Cortes Anna MD 01/20/2017    Reason for Stay (Diagnosis): Sepsis, pneumonia         Assessment and Plan:      Summary of Stay:     Eder Pearson is a 83 year old male w/ hx of dementia, anxiety, CKD, and prostate cancer admitted from his memory care unit on 1/16/2017 with cough and fevers.  Found to have fever and leukocytosis.  Xray concerning for pneumonia.  Also with pyuria.  Initiated on broad spectrum abx with vancomycin, zosyn, and levofloxacin.  Quite somnolent on admission likely from sepsis.  Fevers and leukocytosis resolved.  Urine culture positive for Proteus.  Discontinued vancomycin 1/18.  SLP consulted for concern for aspiration, but does ok with all textures as long as he is alert.  Still suspicious for possible pneumonia given productive sounding cough.  Discontinued zosyn 1/19.  Monitored overnight for fevers but none recurred.  He will discharge back to his memory care facility today with  two more days of levaquin.    I did readdress goals of care with his daughter, Lisa to help define DNR/DNI and the dramatic different between this and full comfort-care status.  She will readdress this further with family but for now he remains FULL CODE.      Update: he will remain hospitalized as he needs a lift delivered to his facility prior to DC.  Discussed w/ care coordinator.  Lift ordered.     Sepsis due to HCAP from possible GNRs and possible UTI: fevers, leukoctyosis, and cough with RUL infiltrate upon presentation, clinically with sepsis which has resolved.   Apparently multiple exposures to RSV positive kids and family recently which could be playing a role as well.  Some concern for aspiration given decreased LOC though doing OK w/ diet now.  Treated as HCAP as he lives in a memory care unit though now have tailored abx to sole levaquin.  UA with pyuria now growing proteus.  Discontinued vancomycin as no data positive for MRSA.    -continue levofloxacin.  Can transition to oral.  Provide 7 total days abx total.    Dementia with behavioral issues: lives in memory care unit.  Has some behavioral issues at baseline.  Alternating with somnolence and alertness here.  Has had some agitation.  -Continue pta medications including cymbalta, namenda, and seroquel  -would make note to give seroquel after meals instead of before so he is not too sedated for eating and decrease the risk of aspiration  -per daughter he does very well with seroquel prn for agitation and would prefer using this to haldol if able.  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  Using a lift intermittently here.  Discussed w/ care coordinator to ensure this is OK w/ his facility prior to DC.    DVT Prophylaxis: Enoxaprain  "(Lovenox) SQ  Code Status: Full Code  FEN: regular diet    Discharge Dispo: memory care unit  Estimated Disch Date / # of Days until Disch: likely d/c tomorrow to memory care unit if no fevers overnight and alert.          Interval History (Subjective):      I assumed care today  Discharge planning  Transitioned to oral abx    Discussed care plan with daughter including code status  Update: he will remain hospitalized as he needs a lift delivered to his facility prior to DC.  Discussed w/ care coordinator.  Lift ordered.                  Physical Exam:      Last Vital Signs:  /86 mmHg  Temp(Src) 97.3  F (36.3  C) (Axillary)  Resp 18  Ht 1.727 m (5' 8\")  Wt 85.957 kg (189 lb 8 oz)  BMI 28.82 kg/m2  SpO2 96%    Constitutional: alert, NAD  Eyes: sclera white   HEENT:   MMM  Respiratory:  Odd upper airway wheeze, that is not stridor  Cardiovascular: RRR.  No murmur   GI: non-tender, not distended, bowel sounds present  Skin: warm, no rash    Musculoskeletal/extremities:  No edema  Neurologic: alert, confused, moves all extremities  Psychiatric: calm         Medications:      All current medications were reviewed with changes reflected in problem list.         Data:      All new lab and imaging data was reviewed.   Labs:    Recent Labs  Lab 01/16/17  2325 01/16/17  2252 01/16/17  2158   CULT No growth after 3 days >100,000 colonies/mL Proteus mirabilis* No growth after 3 days       Recent Labs  Lab 01/20/17  0655 01/19/17  0534 01/18/17  0809   WBC 6.0 5.4 6.4   HGB 9.7* 8.5* 8.6*   HCT 30.8* 27.1* 27.3*    99 100    227 176       Recent Labs  Lab 01/20/17  0655 01/19/17  0534 01/18/17  0809 01/17/17  0643 01/16/17  2158   NA  --  144 143 141 140   POTASSIUM 3.9 3.5 4.1 3.7 4.0   CHLORIDE  --  110* 109 108 106   CO2  --  27 26 24 24   ANIONGAP  --  7 8 9 10   GLC  --  91 91 109* 116*   BUN  --  16 17 28 32*   CR 1.00 1.15 1.20 1.16 1.28*   GFRESTIMATED 72 61 58* 60* 54*   GFRESTBLACK 87 73 70 73 " 65   ARANZA  --  8.8 8.6 8.0* 8.2*   PROTTOTAL  --   --   --   --  6.7*   ALBUMIN  --   --   --   --  2.9*   BILITOTAL  --   --   --   --  1.1   ALKPHOS  --   --   --   --  47   AST  --   --   --   --  23   ALT  --   --   --   --  14       Imaging:  No results found for this or any previous visit (from the past 24 hour(s)).       Cortes Anna MD             Progress Notes by Nahid Arreola MD at 1/19/2017  4:38 PM     Author:  Nahid Arreola MD Service:  Hospitalist Author Type:  Physician    Filed:  1/19/2017  4:43 PM Note Time:  1/19/2017  4:38 PM Status:  Signed    :  Nahid Arreola MD (Physician)           [facility]  Hospitalist Progress Note  Nahid Arreola MD 01/19/2017    Reason for Stay (Diagnosis): Sepsis, pneumonia         Assessment and Plan:      Summary of Stay: Eder Pearson is a 83 year old male w/ hx of dementia, anxiety, CKD, and prostate cancer admitted from his memory care unit on 1/16/2017 with cough and fevers.  Found to have fever and leukocytosis.  Xray concerning for pneumonia.  Also with pyuria.  Initiated on broad spectrum abx with vancomycin, zosyn, and levofloxacin.  Quite somnolent on admission likely from sepsis.  Fevers and leukocytosis resolved.  Urine culture positive for Proteus.  Discontinued vancomycin 1/18.  SLP consulted for concern for aspiration, but does ok with all textures as long as he is alert.  Still suspicious for possible pneumonia given productive sounding cough.  Discontinued zosyn 1/19.  Monitoring overnight for fevers, but possible d/c tomorrow to memory care unit.      Problem List/Assessment and Plan:   Sepsis due to HCAP from possible GNRs and possible UTI: fevers, leukoctyosis, and cough.  Clinically with sepsis.  RUL infiltrated on xray and productive sounding cough.  Apparently multiple exposures to RSV positive kids and family recently.  Some concern for aspiration given decreased LOC.  Treated as  "HCAP as he lives in a memory care unit.  UA with pyuria now growing proteus.  Fevers and leukocytosis resolved.  Discontinued vancomycin as no data positive for MRSA. Monitor fever curve  -continue levofloxacin.  Can transition to oral tomorrow if no fevers.  Would provide 7 total days abx    Dementia with behavioral issues: lives in memory care unit.  Has some behavioral issues at baseline.  Alternating with somnolence and alertness here.  Has had some agitation.  -Continue pta medications including cymbalta, namenda, and seroquel  -would make note to give seroquel after meals instead of before so he is not too sedated for eating and decrease the risk of aspiration  -per daughter he does very well with seroquel prn for agitation and would prefer using this to haldol if able.  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        Interval History (Subjective):      No acute issues overnight.  Somnolent in morning, but woke up to eat meals.  More alert in afternoon and feels good.  Is confused, but not agitated.  Discussed care plan with daughter.                  Physical Exam:      Last Vital Signs:  /81 mmHg  Temp(Src) 98.6  F (37  C) (Oral)  Resp 18  Ht 1.727 m (5' 8\")  Wt 86.864 kg (191 lb 8 oz)  BMI 29.12 kg/m2  SpO2 94%    Constitutional: alert, NAD  Eyes: sclera white   HEENT:   MMM  Respiratory:  Odd upper airway wheeze, that is not stridor  Cardiovascular: RRR.  No murmur   GI: non-tender, not distended, bowel sounds present  Skin: warm, no rash    Musculoskeletal/extremities:  No edema  Neurologic: alert, confused, moves all extremities  Psychiatric: calm        "  Medications:      All current medications were reviewed with changes reflected in problem list.         Data:      All new lab and imaging data was reviewed.   Labs:    Recent Labs  Lab 01/16/17  2325 01/16/17  2252 01/16/17  2158   CULT No growth after 2 days >100,000 colonies/mL Proteus mirabilis* No growth after 2 days       Recent Labs  Lab 01/19/17  0534 01/18/17  0809 01/17/17  0643   WBC 5.4 6.4 12.5*   HGB 8.5* 8.6* 9.1*   HCT 27.1* 27.3* 28.1*   MCV 99 100 100    176 184       Recent Labs  Lab 01/19/17  0534 01/18/17  0809 01/17/17  0643 01/16/17  2158    143 141 140   POTASSIUM 3.5 4.1 3.7 4.0   CHLORIDE 110* 109 108 106   CO2 27 26 24 24   ANIONGAP 7 8 9 10   GLC 91 91 109* 116*   BUN 16 17 28 32*   CR 1.15 1.20 1.16 1.28*   GFRESTIMATED 61 58* 60* 54*   GFRESTBLACK 73 70 73 65   ARANZA 8.8 8.6 8.0* 8.2*   PROTTOTAL  --   --   --  6.7*   ALBUMIN  --   --   --  2.9*   BILITOTAL  --   --   --  1.1   ALKPHOS  --   --   --  47   AST  --   --   --  23   ALT  --   --   --  14       Imaging:  None today     Nahid Arreola MD             Progress Notes by Nahid Arreola MD at 1/18/2017  2:29 PM     Author:  Nahid Arreola MD Service:  Hospitalist Author Type:  Physician    Filed:  1/18/2017  2:39 PM Note Time:  1/18/2017  2:29 PM Status:  Signed    :  Nahid Arreola MD (Physician)           [facility]  Hospitalist Progress Note  Nahid Arreola MD 01/18/2017    Reason for Stay (Diagnosis): Sepsis, pneumonia         Assessment and Plan:      Summary of Stay: Eder Pearson is a 83 year old male w/ hx of dementia, anxiety, CKD, and prostate cancer admitted from his memory care unit on 1/16/2017 with cough and fevers.  Found to have fever and leukocytosis.  Xray concerning for pneumonia.  Also with pyuria.  Initiated on broad spectrum abx with vancomycin, zosyn, and levofloxacin.  Quite somnolent on admission likely from sepsis.  Fevers and leukocytosis  resolved.  Fluctuating alert and somnolent.  Urine culture positive for Proteus.  Discontinuing vancomycin.  SLP consulted for concern for aspiration, but does ok with all textures as long as he is alert.  Still suspicious for possible pneumonia given productive sounding cough.      Problem List/Assessment and Plan:   Sepsis due to HCAP from possible GNRs and possible UTI: fevers, leukoctyosis, and cough.  Clinically with sepsis.  RUL infiltrated on xray and productive sounding cough.  Apparently multiple exposures to RSV positive kids and family recently.  Some concern for aspiration given decreased LOC.  Treated as HCAP as he lives in a memory care unit.  UA with pyuria now growing proteus, which will be covered by these abx.  Fevers and leukocytosis resolved.  -discontinue vancomycin as no data positive for MRSA. Monitor fever curve  -continue zosyn/levofloxacin today, may adjust tomorrow  -follow proteus urine culture for sensitivities    Dementia with behavioral issues: lives in memory care unit.  Has some behavioral issues at baseline.  Alternating with somnolence and alertness here.  Has had some agitation.   -Continue pta medications including cymbalta, namenda, and seroquel  -per daughter he does very well with seroquel prn for agitation and would prefer using this to haldol if able.  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  No fevers overnight.  Urine positive for Proteus.  More alert this morning.  Has a productive sounding  "cough.  Denies pain.  Discussed plan at length with family later in day.  More somnolent in afternoon.                  Physical Exam:      Last Vital Signs:  /69 mmHg  Temp(Src) 97.2  F (36.2  C) (Oral)  Resp 22  Ht 1.727 m (5' 8\")  Wt 86.864 kg (191 lb 8 oz)  BMI 29.12 kg/m2  SpO2 98%    Constitutional: alert, NAD  Eyes: sclera white   HEENT:   MMM  Respiratory:  lungs cta bilaterally, no crackles or wheeze, but productive sounding cough  Cardiovascular: RRR.  No murmur   GI: non-tender, not distended, bowel sounds present  Skin: warm, no rash    Musculoskeletal/extremities:  No edema  Neurologic: alert, confused  Psychiatric: calm         Medications:      All current medications were reviewed with changes reflected in problem list.         Data:      All new lab and imaging data was reviewed.   Labs:    Recent Labs  Lab 01/16/17  2325 01/16/17  2252 01/16/17  2158   CULT No growth after 1 day >100,000 colonies/mL Proteus mirabilisSusceptibility testing in progress* No growth after 1 day       Recent Labs  Lab 01/18/17  0809 01/17/17  0643 01/16/17  2158   WBC 6.4 12.5* 14.1*   HGB 8.6* 9.1* 9.3*   HCT 27.3* 28.1* 28.8*    100 98    184 226       Recent Labs  Lab 01/18/17  0809 01/17/17  0643 01/16/17  2158    141 140   POTASSIUM 4.1 3.7 4.0   CHLORIDE 109 108 106   CO2 26 24 24   ANIONGAP 8 9 10   GLC 91 109* 116*   BUN 17 28 32*   CR 1.20 1.16 1.28*   GFRESTIMATED 58* 60* 54*   GFRESTBLACK 70 73 65   ARANZA 8.6 8.0* 8.2*   PROTTOTAL  --   --  6.7*   ALBUMIN  --   --  2.9*   BILITOTAL  --   --  1.1   ALKPHOS  --   --  47   AST  --   --  23   ALT  --   --  14       Imaging:  None today     Nahid Arreola MD             Progress Notes by Myriam Rowell, PT at 1/18/2017 11:53 AM     Author:  Myriam Rowell PT Service:  (none) Author Type:  Physical Therapist    Filed:  1/18/2017 11:53 AM Note Time:  1/18/2017 11:53 AM Status:  Signed    :  Myriam Rowell PT (Physical " Therapist)              01/18/17 1100   Quick Adds   Type of Visit Initial PT Evaluation   Living Environment   Lives With facility resident   Living Arrangements assisted living  ([facility], [place] location)   Home Accessibility no concerns   Self-Care   Usual Activity Tolerance moderate  (sleeps alot per nurse at [facility])   Current Activity Tolerance poor   Equipment Currently Used at Home wheelchair;walker, rolling;bath bench   Activity/Exercise/Self-Care Comment Spoke to nurse at [facility], pt is total assist for dressing, toileting, bathing, med's. A x2-3 to get OOB, pt can stand on his own and does walk on his own w/ and w/o walker (forgets to take it). Pt isnt supposed to walk alone, but getsd up and goes at will. frequent falls.    Functional Level Prior   Ambulation 1-->assistive equipment  (or w/o prn, supposed to have SBA, pt pt gets up unexpectedly)   Transferring 1-->assistive equipment   Toileting 3-->assistive equipment and person   Bathing 3-->assistive equipment and person   Dressing 2-->assistive person   Cognition 1 - attention or memory deficits   Fall history within last six months yes   Number of times patient has fallen within last six months (frequent falls per nurse)   Prior Functional Level Comment Dementia, frequent falls, walks at will, sleeps alot.   General Information   Onset of Illness/Injury or Date of Surgery - Date 01/16/17   Referring Physician Nahid Arreola MD   Patient/Family Goals Statement none stated, pt confused/dememtia   Pertinent History of Current Problem (include personal factors and/or comorbidities that impact the POC) Pt admitted with UTI and HAP w/ sepsis. hx: dementia , lives in U, has behaviors, can be combative. family considering hospice, per nurse at [facility]   Precautions/Limitations fall precautions   General Observations slouching down in recliner in room   Cognitive Status Examination   Orientation not oriented to person, place or time   Level of  Consciousness confused;lethargic/somnolent   Follows Commands and Answers Questions unable to follow commands   Memory impaired   Cognitive Comment Dememtia   Posture    Posture Comments slouching in chair   Range of Motion (ROM)   ROM Comment BLE WFL for age,    Strength   Strength Comments not able to test due to dementia   Bed Mobility   Bed Mobility Comments Per nurse, lift system. At baseline, per [place] nurse A x 2-3 people   Transfer Skills   Transfer Comments Lift today, at baseline pt gets up unassisted when desires, A x 1-2 if he isnt cooperating.  Today, pt ucooperative. Max A to get to EOC, unable to get pt to stand, pt uncooperative.   Gait   Gait Comments Unable at eval, pt uncooperative. At baseline, per nurse, pt walks about 100' w or w/o fww, at times unassisted as he wanders. high falls risk   General Therapy Interventions   Planned Therapy Interventions progressive activity/exercise;transfer training;gait training   Intervention Comments try to assess level of A needed  for tranfers and gait   Clinical Impression   Criteria for Skilled Therapeutic Intervention yes, treatment indicated   PT Diagnosis below baseline functionin in mobility   Influenced by the following impairments dememtia, pneumonia, lethargy, behaviors   Functional limitations due to impairments unable to assess tranfers and gait at eval due to lack of cooeration, lmechanical lift needed for tranfsers   Clinical Presentation Stable/Uncomplicated   Clinical Presentation Rationale pt on treatment for UTI and pna   Clinical Decision Making (Complexity) Low complexity   Therapy Frequency` daily   Predicted Duration of Therapy Intervention (days/wks) 3x week   Anticipated Discharge Disposition Home  (back to [facility])   Risk & Benefits of therapy have been explained Yes   Patient, Family & other staff in agreement with plan of care Yes   Clinical Impression Comments PT: limited eval due to lack of cooperation, this is past of  "his baseline poer nurse at [facility] AM-PAC TM \"6 Clicks\"   2016, Trustees of [facility], under license to [employer].  All rights reserved.   6 Clicks Short Forms Basic Mobility Inpatient Short Form   [facility] AM-PAC  \"6 Clicks\" V.2 Basic Mobility Inpatient Short Form   1. Turning from your back to your side while in a flat bed without using bedrails? 1 - Total   2. Moving from lying on your back to sitting on the side of a flat bed without using bedrails? 1 - Total   3. Moving to and from a bed to a chair (including a wheelchair)? 1 - Total   4. Standing up from a chair using your arms (e.g., wheelchair, or bedside chair)? 1 - Total   5. To walk in hospital room? 1 - Total   6. Climbing 3-5 steps with a railing? 1 - Total   Basic Mobility Raw Score (Score out of 24.Lower scores equate to lower levels of function) 6   Total Evaluation Time   Total Evaluation Time (Minutes) 13          Progress Notes by Lisa Griffith SLP at 1/18/2017 11:11 AM     Author:  Lisa Griffith SLP Service:  (none) Author Type:  Speech Language Pathologist    Filed:  1/18/2017 11:11 AM Note Time:  1/18/2017 11:11 AM Status:  Signed    :  Gladis, Lisa, SLP (Speech Language Pathologist)              01/18/17 1105   General Information   Onset Date 01/16/17   Start of Care Date 01/18/17   Referring Physician Nahid Arreola MD   Patient Profile Review/OT: Additional Occupational Profile Info See Profile for full history and prior level of function   Patient/Family Goals Statement Pt did not state   Swallowing Evaluation Bedside swallow evaluation   Behaviorial Observations Lethargic   Mode of current nutrition Oral diet   Type of oral diet Regular;Thin liquid   Respiratory Status Room air   Comments Eder Pearson is a 83 year old male w/ hx of dementia, anxiety, CKD, and prostate cancer admitted from his memory care unit on 1/16/2017 with cough and fevers.  Found to have fever and " leukocytosis.  Xray concerning for pneumonia.  Also with pyuria.  Initiated on broad spectrum abx with vancomycin, zosyn, and levofloxacin.  Quite somnolent form admission likely from sepsis.  Fever curve improving and vitally stable.    Clinical Swallow Evaluation   Oral Musculature generally intact   Structural Abnormalities none present   Dentition present and adequate   Mucosal Quality adequate   Mandibular Strength and Mobility intact   Oral Labial Strength and Mobility WFL   Lingual Strength and Mobility WFL   Velar Elevation intact   Buccal Strength and Mobility intact   Laryngeal Function Cough;Throat clear;Swallow;Voicing initiated   Oral Musculature Comments Pt with baseline cough   Additional Documentation Yes   Clinical Swallow Eval: Thin Liquid Texture Trial   Mode of Presentation, Thin Liquids self-fed;straw;fed by clinician   Volume of Liquid or Food Presented 4 oz   Oral Phase of Swallow WFL   Pharyngeal Phase of Swallow intact   Diagnostic Statement Pt tolerated thin liquids via straw with no overt s/sx of aspiratio    Clinical Swallow Eval: Puree Solid Texture Trial   Mode of Presentation, Puree spoon;fed by clinician   Volume of Puree Presented 3 tbsp   Oral Phase, Puree WFL   Pharyngeal Phase, Puree intact   Diagnostic Statement Pt tolerated pureed textures with no overt s/sx of aspiration    Clinical Swallow Eval: Solid Food Texture Trial   Mode of Presentation, Solid fed by clinician   Volume of Solid Food Presented 1/2 isaiah cracker   Oral Phase, Solid other (see comments)  (mildly prolonged time for mastication)   Pharyngeal Phase, Solid intact   Diagnostic Statement Pt required mildly prolonged but functional time for mastication on regular solid textures and no overt s/sx of aspiration    VFSS Evaluation   VFSS Additional Documentation No   FEES Evaluation   Additional Documentation No   Swallow Compensations   Swallow Compensations Alternate viscosity of consistencies;Pacing;Reduce  amounts;Multiple swallow   Results No difficulties noted   Esophageal Phase of Swallow   Patient reports or presents with symptoms of esophageal dysphagia No   General Therapy Interventions   Planned Therapy Interventions Dysphagia Treatment   Dysphagia treatment Instruction of safe swallow strategies;Compensatory strategies for swallowing   Swallow Eval: Clinical Impressions   Skilled Criteria for Therapy Intervention Skilled criteria met.  Treatment indicated.   Functional Assessment Scale (FAS) 6   Treatment Diagnosis Minimal oropharyngeal dysphagia   Diet texture recommendations Regular diet;Thin liquids   Recommended Feeding/Eating Techniques alternate between small bites and sips of food/liquid;check mouth frequently for oral residue/pocketing;hard swallow w/ each bite or sip;maintain upright posture during/after eating for 30 mins;small sips/bites   Demonstrates Need for Referral to Another Service physical therapy;occupational therapy   Therapy Frequency 3 times/wk   Predicted Duration of Therapy Intervention (days/wks) 1-2 follow up sessions   Anticipated Discharge Disposition extended care facility   Risks and Benefits of Treatment have been explained. Yes   Patient, family and/or staff in agreement with Plan of Care Yes   Clinical Impression Comments SLP: Bedside swalllow eval completed per MD orders. Pt presents with minimal oropharyngeal dysphagia in the setting of waxing/waning level of alertness. Pt tolerated all PO textures with no overt s/sx of aspiration; however pt required consistent verbal cues to remain alert throughout ST session. Recommend continue regular textures and thin liquids with supervision. Pt should be fully upright for all PO, take small single sips/bites, alternate consistencies, and pace self. Hold PO should pt demonstrate overt s/sx of aspiration. ST to continue to follow for 1-2 sessions to assess diet tolerance    Total Evaluation Time   Total Evaluation Time (Minutes) 8                 Procedure Notes     No notes of this type exist for this encounter.         Progress Notes - Therapies (Notes from 01/18/17 through 01/21/17)      Progress Notes by Myriam Rowell PT at 1/18/2017 11:53 AM     Author:  Myriam Rowell PT Service:  (none) Author Type:  Physical Therapist    Filed:  1/18/2017 11:53 AM Note Time:  1/18/2017 11:53 AM Status:  Signed    :  Myriam Rowell PT (Physical Therapist)              01/18/17 1100   Quick Adds   Type of Visit Initial PT Evaluation   Living Environment   Lives With facility resident   Living Arrangements assisted living  ([facility], [place] location)   Home Accessibility no concerns   Self-Care   Usual Activity Tolerance moderate  (sleeps alot per nurse at [facility])   Current Activity Tolerance poor   Equipment Currently Used at Home wheelchair;walker, rolling;bath bench   Activity/Exercise/Self-Care Comment Spoke to nurse at [facility], pt is total assist for dressing, toileting, bathing, med's. A x2-3 to get OOB, pt can stand on his own and does walk on his own w/ and w/o walker (forgets to take it). Pt isnt supposed to walk alone, but getsd up and goes at will. frequent falls.    Functional Level Prior   Ambulation 1-->assistive equipment  (or w/o prn, supposed to have SBA, pt pt gets up unexpectedly)   Transferring 1-->assistive equipment   Toileting 3-->assistive equipment and person   Bathing 3-->assistive equipment and person   Dressing 2-->assistive person   Cognition 1 - attention or memory deficits   Fall history within last six months yes   Number of times patient has fallen within last six months (frequent falls per nurse)   Prior Functional Level Comment Dementia, frequent falls, walks at will, sleeps alot.   General Information   Onset of Illness/Injury or Date of Surgery - Date 01/16/17   Referring Physician Nahid Arreola MD   Patient/Family Goals Statement none stated, pt confused/dememtia   Pertinent History of Current  Problem (include personal factors and/or comorbidities that impact the POC) Pt admitted with UTI and HAP w/ sepsis. hx: dementia , lives in [facility], has behaviors, can be combative. family considering hospice, per nurse at [place]   Precautions/Limitations fall precautions   General Observations slouching down in recliner in room   Cognitive Status Examination   Orientation not oriented to person, place or time   Level of Consciousness confused;lethargic/somnolent   Follows Commands and Answers Questions unable to follow commands   Memory impaired   Cognitive Comment Dememtia   Posture    Posture Comments slouching in chair   Range of Motion (ROM)   ROM Comment BLE WFL for age,    Strength   Strength Comments not able to test due to dementia   Bed Mobility   Bed Mobility Comments Per nurse, lift system. At baseline, per [place] nurse A x 2-3 people   Transfer Skills   Transfer Comments Lift today, at baseline pt gets up unassisted when desires, A x 1-2 if he isnt cooperating.  Today, pt ucooperative. Max A to get to EOC, unable to get pt to stand, pt uncooperative.   Gait   Gait Comments Unable at [facility], pt uncooperative. At baseline, per nurse, pt walks about 100' w or w/o fww, at times unassisted as he wanders. high falls risk   General Therapy Interventions   Planned Therapy Interventions progressive activity/exercise;transfer training;gait training   Intervention Comments try to assess level of A needed  for tranfers and gait   Clinical Impression   Criteria for Skilled Therapeutic Intervention yes, treatment indicated   PT Diagnosis below baseline functionin in mobility   Influenced by the following impairments dememtia, pneumonia, lethargy, behaviors   Functional limitations due to impairments unable to assess tranfers and gait at eval due to lack of cooeration, lmechanical lift needed for tranfsers   Clinical Presentation Stable/Uncomplicated   Clinical Presentation Rationale pt on treatment for UTI and pna  "  Clinical Decision Making (Complexity) Low complexity   Therapy Frequency` daily   Predicted Duration of Therapy Intervention (days/wks) 3x week   Anticipated Discharge Disposition Home  (back to [facility])   Risk & Benefits of therapy have been explained Yes   Patient, Family & other staff in agreement with plan of care Yes   Clinical Impression Comments PT: limited eval due to lack of cooperation, this is past of his baseline poer nurse at [facility] [place] by [facility] AM-PAC TM \"6 Clicks\"   2016, Trustees of [facility], under license to [employer].  All rights reserved.   6 Clicks Short Forms Basic Mobility Inpatient Short Form   [facility] AM-PAC  \"6 Clicks\" V.2 Basic Mobility Inpatient Short Form   1. Turning from your back to your side while in a flat bed without using bedrails? 1 - Total   2. Moving from lying on your back to sitting on the side of a flat bed without using bedrails? 1 - Total   3. Moving to and from a bed to a chair (including a wheelchair)? 1 - Total   4. Standing up from a chair using your arms (e.g., wheelchair, or bedside chair)? 1 - Total   5. To walk in hospital room? 1 - Total   6. Climbing 3-5 steps with a railing? 1 - Total   Basic Mobility Raw Score (Score out of 24.Lower scores equate to lower levels of function) 6   Total Evaluation Time   Total Evaluation Time (Minutes) 13          "

## 2017-01-16 NOTE — IP AVS SNAPSHOT
"` `           [place] [address] MEDICAL SURGICAL: [phone]                                              INTERAGENCY TRANSFER FORM - NURSING   2017                    Hospital Admission Date: 2017  CALEB ARELLANO   : 1933  Sex: Male        Attending Provider: Rudy Grey MD     Allergies:  No Known Allergies    Infection:  None   Service:  GENERAL MEDI    Ht:  1.727 m (5' 8\")   Wt:  85.957 kg (189 lb 8 oz)   Admission Wt:  90.719 kg (200 lb)    BMI:  28.82 kg/m 2   BSA:  2.03 m 2            Patient PCP Information     Provider PCP Type    [facility]      Current Code Status     Date Active Code Status Order ID Comments User Context       Prior      Code Status History     Date Active Date Inactive Code Status Order ID Comments User Context    2017 11:27 AM  Full Code [id number]  Cortes Anna MD Outpatient    2017  2:05 AM 2017 11:27 AM Full Code [id number]  Rudy Grey MD Inpatient      Advance Directives        Does patient have a scanned Advance Directive/ACP document in EPIC?           Yes        Hospital Problems as of 2017              Priority Class Noted POA    Urinary frequency Medium  2017 Unknown    HAP (hospital-acquired pneumonia) Medium  2017 Unknown      Non-Hospital Problems as of 2017     None      Immunizations     Name Date      Pneumococcal 23 valent 17          END      ASSESSMENT     Discharge Profile Flowsheet     EXPECTED DISCHARGE     Passing flatus  yes 17 0317    Expected Discharge Date  17 (DRG 3.8> admit > from [facility]) 17 1346   COMMUNICATION ASSESSMENT      DISCHARGE NEEDS ASSESSMENT     Patient's communication style  spoken language (English or Bilingual) 17 2211    Patient/family verbalizes understanding of discharge plan recommendations?  Yes 17 1153   SKIN      Medical Team notified of plan?  yes 17 1153   " "Inspection  Full 01/21/17 0317    Readmission Within The Last 30 Days  current reason for admission unrelated to previous admission 01/17/17 1153   Skin WDL  ex 01/21/17 0317    Equipment Currently Used at Home  wheelchair;walker, rolling;bath bench 01/18/17 1130   Skin Temperature  warm 01/21/17 0317    Transportation Available  none (Family requests WC van transport) 01/17/17 1153   Skin Moisture  dry 01/21/17 0317    Does Patient Need a Referral for Clinic CC  Yes 01/17/17 1153   Skin Integrity  bruise(s);erosion;scab(s) 01/21/17 0317    Coordination Referral Criteria  Admission DX PN 01/17/17 1153   Additional Documentation  Wound (LDA) 01/20/17 1728    GASTROINTESTINAL (ADULT,PEDIATRIC,OB)     Skin Elasticity  slow return to original state 01/20/17 1728    GI WDL  ex 01/21/17 0317   SAFETY      Last Bowel Movement  01/18/17 01/20/17 1728   Safety WDL  WDL 01/21/17 1044    GI Signs/Symptoms  fecal incontinence 01/21/17 0317   Safety Factors  patient up in chair;call light in reach;ID band on 01/21/17 1044                 Assessment WDL (Within Defined Limits) Definitions           Safety WDL     Effective: 09/28/15    Row Information: <b>WDL Definition:</b> Bed in low position, wheels locked; call light in reach; upper side rails up x 2; ID band on<br> <font color=\"gray\"><i>Item=AS safety wdl>>List=AS safety wdl>>Version=F14</i></font>      Skin WDL     Effective: 09/28/15    Row Information: <b>WDL Definition:</b> Warm; dry; intact; elastic; without discoloration; pressure points without redness<br> <font color=\"gray\"><i>Item=AS skin wdl>>List=AS skin wdl>>Version=F14</i></font>      Vitals     Vital Signs Flowsheet     VITAL SIGNS     PAINAD Facial Expression  0-->smiling or inexpressive 01/20/17 0527    Temp  98.2  F (36.8  C) 01/21/17 0802   PAINAD Body Language  0-->relaxed 01/20/17 0527    Temp src  Oral 01/21/17 0802   PAINAD Consolability  0-->no need to console 01/20/17 0527    Resp  18 01/21/17 0802   " "PAINAD Score  0 01/20/17 0527    Heart Rate  62 01/21/17 0802   HEIGHT AND WEIGHT      Pulse/Heart Rate Source  Monitor 01/21/17 0802   Height  1.727 m (5' 8\") 01/16/17 2237    BP  (!) 194/97 mmHg 01/21/17 0802   Height Method  Stated 01/16/17 2237    BP Location  Right arm 01/21/17 0802   Weight  85.957 kg (189 lb 8 oz) (bed weight) 01/20/17 0520    OXYGEN THERAPY     BSA (Calculated - sq m)  2.09 01/16/17 2237    SpO2  97 % 01/21/17 0802   BMI (Calculated)  30.47 01/16/17 2237    O2 Device  None (Room air) 01/21/17 0802   POSITIONING      FiO2 (%)  2 % 01/17/17 0816   Body Position  supine 01/21/17 0802    Oxygen Delivery  2 LPM 01/17/17 1505   Head of Bed (HOB)  HOB flat 01/21/17 0802    PAIN/COMFORT     Positioning/Transfer Devices  pillows 01/20/17 1728    Patient Currently in Pain  sleeping: patient not able to self report 01/21/17 0802   Chair  Upright in chair 01/20/17 1050    Preferred Pain Scale  number (Numeric Rating Pain Scale) 01/20/17 1623   DAILY CARE      Patient's Stated Pain Goal  No pain 01/20/17 1623   Activity Type  bedrest 01/21/17 0317    0-10 Pain Scale  0 01/20/17 1623   Activity Level of Assistance  assistance, 2 people 01/21/17 0317    FACES Pain Rating  0-->no hurt 01/20/17 0527   Activity Assistive Device  mechanical lift 01/21/17 0317    PAINAD Breathing  0-->normal 01/20/17 0527   Additional Documentation  Activity Device Assistance (Row) 01/20/17 1728    PAINAD Negative Vocalization  0-->none 01/20/17 0527                 Patient Lines/Drains/Airways Status    Active LINES/DRAINS/AIRWAYS     Name: Placement date: Placement time: Site: Days: Last dressing change:    Peripheral IV 01/18/17 Right Lower forearm 01/18/17  2348  Lower forearm  2     Wound 01/17/17 Scrotum 01/17/17  0215  Scrotum  4             Patient Lines/Drains/Airways Status    Active PICC/CVC     **None**            Intake/Output Detail Report     Date Intake     Output    Shift P.O. I.V. IV Piggyback Total Total    "    Noc 01/19/17 2300 - 01/20/17 0659 -- -- -- -- -- 0    Day 01/20/17 0700 - 01/20/17 1459 480 -- -- 480 -- 480    Mellisa 01/20/17 1500 - 01/20/17 2259 -- -- -- -- -- 0    Noc 01/20/17 2300 - 01/21/17 0659 -- 3 -- 3 -- 3    Day 01/21/17 0700 - 01/21/17 1459 240 -- -- 240 -- 240      Last Void/BM       Most Recent Value    Urine Occurrence 1 at 01/21/2017 1043    Stool Occurrence 0 at 01/19/2017 0543      Case Management/Discharge Planning     Case Management/Discharge Planning Flowsheet     REFERRAL INFORMATION     Description of Support System  Supportive;Involved 01/17/17 1153    Did the Initial Social Work Assessment result in a Social Work Case?  Yes 01/17/17 1153   EMPLOYMENT      Admission Type  inpatient 01/17/17 1153   Do you work full or part-time?  no 01/17/17 1153    Arrived From  another healthcare institution, not defined 01/17/17 1153   COPING/STRESS      Referral Source  high risk screening 01/17/17 1153   Major Change/Loss/Stressor  none 01/19/17 2032    New Steerage to [facility]?  No 01/17/17 1153   EXPECTED DISCHARGE      Reason For Consult  care coordination/care conference;discharge planning 01/17/17 1153   Expected Discharge Date  01/20/17 (DRG 3.8> admit 1/16> from [facility]) 01/18/17 1346    Record Reviewed  clinical discipline documentation;history and physical;medical record;patient profile;plan of care 01/17/17 1153   DISCHARGE PLANNING       Assigned to Karson Stockton RN 01/17/17 1153   Patient/family verbalizes understanding of discharge plan recommendations?  Yes 01/17/17 1153    Primary Care Clinic Name  [facility] 01/17/17 1153   Medical Team notified of plan?  yes 01/17/17 1153    LIVING ENVIRONMENT     Readmission Within The Last 30 Days  current reason for admission unrelated to previous admission 01/17/17 1153    Lives With  facility resident 01/18/17 1112   Transportation Available  none (Family requests WC van transport) 01/17/17 1153     Living Arrangements  assisted living ([facility] [place] location) 01/18/17 1130   Does Patient Need a Referral for Clinic CC  Yes 01/17/17 1153    Primary Care Provided By  other (see comments) (facility staff) 01/17/17 1153   Coordination Referral Criteria  Admission DX PN 01/17/17 1153    Able to Return to Prior Living Arrangements  yes 01/17/17 1153   FINAL RESOURCES      HOME SAFETY     Equipment Currently Used at Home  wheelchair;walker, rolling;bath bench 01/18/17 1130    Patient Feels Safe Living in Home?  yes 01/17/17 1153   ABUSE RISK SCREEN      ASSESSMENT OF FAMILY/SOCIAL SUPPORT     QUESTION TO PATIENT:  Has a member of your family or a partner(now or in the past) intimidated, hurt, manipulated, or controlled you in any way?  no 01/16/17 2213    Marital Status   01/17/17 1153   (R) MENTAL HEALTH SUICIDE RISK      Who is your support system?  Facility resident(s)/Staff 01/17/17 1153   Are you depressed or being treated for depression?  Yes 01/18/17 0256

## 2017-01-16 NOTE — IP AVS SNAPSHOT
` `     [place] [address] MEDICAL SURGICAL: [phone]            Medication Administration Report for Eder Pearson as of 01/21/17 1129   Legend:    Given Hold Not Given Due Canceled Entry Other Actions    Time Time (Time) Time  Time-Action       Inactive    Active    Linked        Medications 01/15/17 01/16/17 01/17/17 01/18/17 01/19/17 01/20/17 01/21/17    acetaminophen (TYLENOL) tablet 1,000 mg  Dose: 1,000 mg Freq: 2 TIMES DAILY Route: PO  Start: 01/17/17 1000   Admin Instructions: Maximum acetaminophen dose from all sources = 75 mg/kg/day not to exceed 4 gram       (1003)-Not Given [C]       (2150)-Not Given        0933 (1,000 mg)-Given       2059 (1,000 mg)-Given        0837 (1,000 mg)-Given       2156 (1,000 mg)-Given        0946 (1,000 mg)-Given       2111 (1,000 mg)-Given        0926 (1,000 mg)-Given       [ ] 2100           acetaminophen (TYLENOL) tablet 650 mg  Dose: 650 mg Freq: EVERY 4 HOURS PRN Route: PO  PRN Reason: mild pain  Start: 01/17/17 0205   Admin Instructions: Alternate ibuprofen (if ordered) with acetaminophen.  Maximum acetaminophen dose from all sources = 75 mg/kg/day not to exceed 4 grams/day.       0821 (650 mg)-Given               albuterol neb solution 2.5 mg  Dose: 3 mL Freq: EVERY 2 HOURS PRN Route: NEBULIZATION  PRN Reasons: wheezing,shortness of breath / dyspnea  Start: 01/17/17 0205      0430 (2.5 mg)-Given        0849 (2.5 mg)-Given              bisacodyl (DULCOLAX) Suppository 10 mg  Dose: 10 mg Freq: DAILY PRN Route: RE  PRN Reason: constipation  Start: 01/17/17 0205   Admin Instructions: Hold for loose stools.  This is the third step of a three step constipation treatment protocol.               cholecalciferol (vitamin D3) capsule CAPS 5,000 Units  Dose: 5,000 Units Freq: DAILY Route: PO  Start: 01/17/17 0900      1003 (5,000 Units)-Given        0933 (5,000 Units)-Given        0837 (5,000 Units)-Given        0946 (5,000 Units)-Given        0926 (5,000 Units)-Given            cyanocobalamin (vitamin  B-12) tablet 1,000 mcg  Dose: 1,000 mcg Freq: DAILY Route: PO  Start: 01/17/17 0900      1000 (1,000 mcg)-Given        0933 (1,000 mcg)-Given        0836 (1,000 mcg)-Given        0946 (1,000 mcg)-Given        0927 (1,000 mcg)-Given           DULoxetine (CYMBALTA) EC capsule 30 mg  Dose: 30 mg Freq: DAILY Route: PO  Start: 01/17/17 1000      1001 (30 mg)-Given        0933 (30 mg)-Given        0836 (30 mg)-Given        0946 (30 mg)-Given        0927 (30 mg)-Given           enoxaparin (LOVENOX) injection 40 mg  Dose: 40 mg Freq: EVERY 24 HOURS Route: SC  Start: 01/17/17 0900   Admin Instructions: HOLD if platelet count falls below 50% of baseline or less than 100,000/ L and notify provider.       (1012)-Not Given [C]       1433 (40 mg)-Given        1307 (40 mg)-Given        1303 (40 mg)-Given        1436 (40 mg)-Given        [ ] 1400           guaiFENesin (ROBITUSSIN) 20 mg/mL solution 10 mL  Dose: 10 mL Freq: EVERY 4 HOURS PRN Route: PO  PRN Reason: other  PRN Comment: secretion expectorant  Start: 01/17/17 0205              ipratropium - albuterol 0.5 mg/2.5 mg/3 mL (DUONEB) neb solution 3 mL  Dose: 3 mL Freq: EVERY 4 HOURS PRN Route: NEBULIZATION  PRN Reason: wheezing  Start: 01/18/17 0826       1706 (3 mL)-Given        1647 (3 mL)-Given             levofloxacin (LEVAQUIN) infusion 750 mg  Dose: 750 mg Freq: EVERY 24 HOURS Route: IV  Indications of Use: HEALTHCARE-ASSOCIATED PNEUMONIA  Last Dose: 750 mg (01/21/17 1009)  Start: 01/19/17 0800   Admin Instructions: FIRST DOSE STAT-start within 4 hrs of patient's arrival to hospital  Administer at a rate of no greater than 100mL/hr         0836 (750 mg)-New Bag [C]        0945 (750 mg)-New Bag        1009 (750 mg)-New Bag           magnesium hydroxide (MILK OF MAGNESIA) suspension 30 mL  Dose: 30 mL Freq: DAILY PRN Route: PO  PRN Reason: constipation  Start: 01/17/17 0205   Admin Instructions: Hold for loose stools.  This is the second  step of a three step constipation treatment protocol.               memantine (NAMENDA) tablet 10 mg  Dose: 10 mg Freq: DAILY Route: PO  Start: 01/17/17 0900      1001 (10 mg)-Given        0934 (10 mg)-Given        0836 (10 mg)-Given        0946 (10 mg)-Given        0926 (10 mg)-Given           naloxone (NARCAN) injection 0.1-0.4 mg  Dose: 0.1-0.4 mg Freq: EVERY 2 MIN PRN Route: IV  PRN Reason: opioid reversal  Start: 01/17/17 0205   Admin Instructions: For respiratory rate LESS than or EQUAL to 8.  Partial reversal dose:  0.1 mg titrated q 2 minutes for Analgesia Side Effects Monitoring Sedation Level of 3 (frequently drowsy, arousable, drifts to sleep during conversation).Full reversal dose:  0.4 mg bolus for Analgesia Side Effects Monitoring Sedation Level of 4 (somnolent, minimal or no response to stimulation).               ondansetron (ZOFRAN-ODT) ODT tab 4 mg  Dose: 4 mg Freq: EVERY 6 HOURS PRN Route: PO  PRN Reason: nausea  Start: 01/17/17 0205   Admin Instructions: This is Step 1 of nausea and vomiting management.  If nausea not resolved in 15 minutes, go to Step 2 prochlorperazine (COMPAZINE). Do not push through foil backing. Peel back foil and gently remove. Place on tongue immediately. Administration with liquid unnecessary              Or  ondansetron (ZOFRAN) injection 4 mg  Dose: 4 mg Freq: EVERY 6 HOURS PRN Route: IV  PRN Reasons: nausea,vomiting  Start: 01/17/17 0205   Admin Instructions: This is Step 1 of nausea and vomiting management.  If nausea not resolved in 15 minutes, go to Step 2 prochlorperazine (COMPAZINE).               potassium chloride (KLOR-CON) Packet 20-40 mEq  Dose: 20-40 mEq Freq: EVERY 2 HOURS PRN Route: ORAL OR FEED  PRN Reason: potassium supplementation  Start: 01/17/17 0205   Admin Instructions: Use if unable to tolerate tablets.  If Serum K+ 3.0-3.3, dose = 60 mEq po total dose (40 mEq x1 followed in 2 hours by 20 mEq x1). Recheck K+ level 4 hours after dose and the next  AM.  If Serum K+ 2.5-2.9, dose = 80 mEq po total dose (40 mEq Q2H x2). Recheck K+ level 4 hours after dose and the next AM.  If Serum K+ less than 2.5, See IV order.  Dissolve packet contents in 4-8 ounces of cold water or juice.               potassium chloride 10 mEq in 100 mL intermittent infusion  Dose: 10 mEq Freq: EVERY 1 HOUR PRN Route: IV  PRN Reason: potassium supplementation  Start: 01/17/17 0205   Admin Instructions: Infuse via PERIPHERAL LINE or CENTRAL LINE. Use for central line replacement if patient weight less than 65 kg, if patient is on TPN with high potassium content or if unit does not stock 20 mEq bags.   If Serum K+ 3.0-3.3, dose = 10 mEq/hr x4 doses (40 mEq IV total dose). Recheck K+ level 2 hours after dose and the next AM.   If Serum K+ less than 3.0, dose = 10 mEq/hr x6 doses (60 mEq IV total dose). Recheck K+ level 2 hours after dose and the next AM.               potassium chloride 10 mEq in 100 mL intermittent infusion with 10 mg lidocaine  Dose: 10 mEq Freq: EVERY 1 HOUR PRN Route: IV  PRN Reason: potassium supplementation  Start: 01/17/17 0205   Admin Instructions: Infuse via PERIPHERAL LINE. Use potassium with lidocaine for pain with peripheral administration.  If Serum K+ 3.0-3.3, dose = 10 mEq/hr x4 doses (40 mEq IV total dose). Recheck K+ level 2 hours after dose and the next AM.  If Serum K+ less than 3.0, dose = 10 mEq/hr x6 doses (60 mEq IV total dose). Recheck K+ level 2 hours after dose and the next AM.               potassium chloride 20 mEq in 50 mL intermittent infusion  Dose: 20 mEq Freq: EVERY 1 HOUR PRN Route: IV  PRN Reason: potassium supplementation  Start: 01/17/17 0205   Admin Instructions: Infuse via CENTRAL LINE Only. May need EKG if less than 65 kg or on TPN - Max rate is 0.3 mEq/kg/hr for patients not on EKG monitoring.   If Serum K+ 3.0-3.3, dose = 20 mEq/hr x2 doses (40 mEq IV total dose). Recheck K+ level 2 hours after dose and the next AM.  If Serum K+ less  than 3.0, dose = 20 mEq/hr x3 doses (60 mEq IV total dose). Recheck K+ level 2 hours after dose and the next AM.               potassium chloride SA (K-DUR/KLOR-CON M) CR tablet 20-40 mEq  Dose: 20-40 mEq Freq: EVERY 2 HOURS PRN Route: PO  PRN Reason: potassium supplementation  Start: 01/17/17 0205   Admin Instructions: Use if able to take PO.   If Serum K+ 3.0-3.3, dose = 60 mEq po total dose (40 mEq x1 followed in 2 hours by 20 mEq x1). Recheck K+ level 4 hours after dose and the next AM.  If Serum K+ 2.5-2.9, dose = 80 mEq po total dose (40 mEq Q2H x2). Recheck K+ level 4 hours after dose and the next AM.  If Serum K+ less than 2.5, See IV order.               prochlorperazine (COMPAZINE) injection 5 mg  Dose: 5 mg Freq: EVERY 6 HOURS PRN Route: IV  PRN Reasons: nausea,vomiting  Start: 01/17/17 0205   Admin Instructions: This is Step 2 of nausea and vomiting management.   If nausea not resolved in 15 minutes, give metoclopramide (REGLAN) if ordered (step 3 of nausea and vomiting management)              Or  prochlorperazine (COMPAZINE) tablet 5 mg  Dose: 5 mg Freq: EVERY 6 HOURS PRN Route: PO  PRN Reason: vomiting  Start: 01/17/17 0205   Admin Instructions: This is Step 2 of nausea and vomiting management.   If nausea not resolved in 15 minutes, give metoclopramide (REGLAN) if ordered (step 3 of nausea and vomiting management)              Or  prochlorperazine (COMPAZINE) Suppository 12.5 mg  Dose: 12.5 mg Freq: EVERY 12 HOURS PRN Route: RE  PRN Reasons: nausea,vomiting  Start: 01/17/17 0205   Admin Instructions: This is Step 2 of nausea and vomiting management.   If nausea not resolved in 15 minutes, give metoclopramide (REGLAN) if ordered (step 3 of nausea and vomiting management)               QUEtiapine (SEROquel) half-tab 12.5 mg  Dose: 12.5 mg Freq: EVERY 12 HOURS PRN Route: PO  PRN Comment: agitation  Start: 01/17/17 0911              QUEtiapine (SEROquel) half-tab 12.5 mg  Dose: 12.5 mg Freq: DAILY WITH  SUPPER Route: PO  Start: 01/17/17 1700      (1648)-Not Given [C]        (2024)-Not Given        1802 (12.5 mg)-Given        1625 (12.5 mg)-Given        [ ] 1700           QUEtiapine (SEROquel) tablet 25 mg  Dose: 25 mg Freq: AT BEDTIME Route: PO  Start: 01/17/17 2200      (2150)-Not Given [C]        2058 (25 mg)-Given               2155 (25 mg)-Given        2112 (25 mg)-Given        [ ] 2200           QUEtiapine (SEROquel) tablet 25 mg  Dose: 25 mg Freq: EVERY MORNING Route: PO  Start: 01/17/17 0900      (1001)-Not Given        0934 (25 mg)-Given        0837 (25 mg)-Given        0946 (25 mg)-Given        0926 (25 mg)-Given           senna-docusate (SENOKOT-S;PERICOLACE) 8.6-50 MG per tablet 1-2 tablet  Dose: 1-2 tablet Freq: 2 TIMES DAILY PRN Route: PO  PRN Comment: constipation   Start: 01/17/17 0205   Admin Instructions: If no bowel movement in 24 hours, increase to 2 tablets PO BID.  Hold for loose stools.   This is the first step of a three step constipation treatment protocol.               sodium chloride (OCEAN) 0.65 % nasal spray 2 spray  Dose: 2 spray Freq: 2 TIMES DAILY Route: BOTH NOSTRIL  Start: 01/17/17 1000      (1141)-Not Given [C]       2155 (2 spray)-Given        0941 (2 spray)-Given       (2317)-Not Given [C]        0837 (2 spray)-Given       2157 (2 spray)-Given        0946 (2 spray)-Given       2112 (2 spray)-Given        1000 (2 spray)-Given       [ ] 2100           sodium chloride (PF) 0.9% PF flush 3 mL  Dose: 3 mL Freq: EVERY 1 HOUR PRN Route: IK  PRN Reason: line flush  Start: 01/18/17 2358        0055 (3 mL)-Given       0544 (3 mL)-Given             sodium chloride (PF) 0.9% PF flush 3 mL  Dose: 3 mL Freq: EVERY 8 HOURS Route: IK  Start: 01/19/17 0000        0004 (3 mL)-Given       (0837)-Not Given       1639 (3 mL)-Given        0045 (3 mL)-Given       0947 (3 mL)-Given       1625 (3 mL)-Given        0007 (3 mL)-Given       1001 (3 mL)-Given       [ ] 1600           tamsulosin (FLOMAX)  capsule 0.4 mg  Dose: 0.4 mg Freq: AT BEDTIME Route: PO  Start: 01/17/17 2200   Admin Instructions: Hold for SBP < 100       (2150)-Not Given        2059 (0.4 mg)-Given               2155 (0.4 mg)-Given        2112 (0.4 mg)-Given        [ ] 2200          Completed Medications  Medications 01/15/17 01/16/17 01/17/17 01/18/17 01/19/17 01/20/17 01/21/17         Dose: 0.5 mL Freq: PRIOR TO DISCHARGE Route: IM  Start: 01/19/17 1000   End: 01/19/17 1302   Admin Instructions: Administer when afebrile (less than 100.4 ) x 24 hr OR prior to discharge. *Give Fact Sheet to Patient*. If patient is febrile, reassess in 8 hrs or every shift. Minor illnesses with or without fever does NOT contraindicate the vaccination. If not administering when scheduled , change the due time by following the instructions in the reference link below. If patient refuses vaccine, chart as Vaccine Refused.         1302 (0.5 mL)-Given            Discontinued Medications  Medications 01/15/17 01/16/17 01/17/17 01/18/17 01/19/17 01/20/17 01/21/17         Dose: 750 mg Freq: EVERY 48 HOURS Route: IV  Indications of Use: HEALTHCARE-ASSOCIATED PNEUMONIA  Last Dose: 750 mg (01/17/17 2326)  Start: 01/17/17 2330   End: 01/19/17 0645   Admin Instructions: FIRST DOSE STAT-start within 4 hrs of patient's arrival to hospital  Administer at a rate of no greater than 100mL/hr       2326 (750 mg)-New Bag         0645-Med Discontinued           Dose: 4.5 g Freq: EVERY 6 HOURS Route: IV  Indications of Use: HEALTHCARE-ASSOCIATED PNEUMONIA  Last Dose: 4.5 g (01/19/17 0548)  Start: 01/17/17 0600   End: 01/19/17 0645   Admin Instructions: FIRST DOSE STAT-start within 4 hrs of patient's arrival to hospital       0600 (4.5 g)-New Bag       1314 (4.5 g)-New Bag       1905 (4.5 g)-New Bag        0117 (4.5 g)-New Bag       0658 (4.5 g)-New Bag       1307 (4.5 g)-New Bag       (2316)-Not Given        0000 (4.5 g)-New Bag       0548 (4.5 g)-New Bag       0645-Med Discontinued

## 2017-01-16 NOTE — IP AVS SNAPSHOT
"[place] [address] MEDICAL SURGICAL: [phone]                                              INTERAGENCY TRANSFER FORM - PHYSICIAN ORDERS   2017                    Hospital Admission Date: 2017  CALEB ARELLANO   : 1933  Sex: Male        Attending Provider: Rudy Grey MD     Allergies:  No Known Allergies    Infection:  None   Service:  GENERAL MEDI    Ht:  1.727 m (5' 8\")   Wt:  85.957 kg (189 lb 8 oz)   Admission Wt:  90.719 kg (200 lb)    BMI:  28.82 kg/m 2   BSA:  2.03 m 2            Patient PCP Information     Provider PCP Type    [facility] General      ED Clinical Impression     Diagnosis Description Comment Added By Time Added    Urinary tract infection, site unspecified [N39.0] Urinary tract infection, site unspecified [N39.0]  Any Dobbs MD 2017  1:38 AM    Pneumonia of right lower lobe due to infectious organism [J18.9] Pneumonia of right lower lobe due to infectious organism [J18.9]  Any Dobbs MD 2017  1:39 AM      Hospital Problems as of 2017              Priority Class Noted POA    Urinary frequency Medium  2017 Unknown    HAP (hospital-acquired pneumonia) Medium  2017 Unknown      Non-Hospital Problems as of 2017     None      Code Status History     Date Active Date Inactive Code Status Order ID Comments User Context    2017 11:27 AM  Full Code [id number]  Cortes Anna MD Outpatient    2017  2:05 AM 2017 11:27 AM Full Code [id number]  Rudy Grey MD Inpatient         Medication Review      START taking        Dose / Directions Comments    levofloxacin 500 MG tablet   Commonly known as:  LEVAQUIN   Used for:  Urinary tract infection, site unspecified, Pneumonia of right lower lobe due to infectious organism        Dose:  500 mg   Take 1 tablet (500 mg) by mouth daily for 2 days   Quantity:  2 tablet   Refills:  0          CONTINUE these medications which have NOT CHANGED  "       Dose / Directions Comments    * acetaminophen 500 MG tablet   Commonly known as:  TYLENOL        Dose:  1000 mg   Take 1,000 mg by mouth 2 times daily   Refills:  0        * acetaminophen 500 MG tablet   Commonly known as:  TYLENOL        Dose:  1000 mg   Take 1,000 mg by mouth daily as needed for mild pain   Refills:  0        bisacodyl 10 MG Suppository   Commonly known as:  DULCOLAX        Dose:  10 mg   Place 10 mg rectally daily as needed for constipation   Refills:  0        DULoxetine 30 MG EC capsule   Commonly known as:  CYMBALTA        Dose:  30 mg   Take 30 mg by mouth daily   Refills:  0        FLOMAX 0.4 MG capsule   Generic drug:  tamsulosin        Dose:  0.4 mg   Take 0.4 mg by mouth At Bedtime   Refills:  0        menthol-zinc oxide 0.44-20.625 % Oint ointment   Commonly known as:  CALMOSEPTINE        Apply topically every 8 hours as needed for skin protection   Refills:  0        miconazole with skin protectant 2 % Crea cream        Apply topically every 8 hours Apply to buttocks topically every shift for barrier cream with each toileting   Refills:  0        NAMENDA PO        Dose:  10 mg   Take 10 mg by mouth daily   Refills:  0        NATURAL FIBER PO        Dose:  1 tsp.   Take 1 tsp. by mouth daily   Refills:  0        phenylephrine-shark liver oil-mineral oil-petrolatum 0.25-14-74.9 % rectal ointment   Commonly known as:  PREPARATION H        Place rectally daily as needed for hemorrhoids   Refills:  0        * QUETIAPINE FUMARATE PO        Dose:  12.5 mg   Take 12.5 mg by mouth daily (with lunch)   Refills:  0        * SEROQUEL 25 MG tablet   Generic drug:  QUEtiapine        Dose:  12.5 mg   Take 12.5 mg by mouth daily (with dinner)   Refills:  0        * SEROQUEL 25 MG tablet   Generic drug:  QUEtiapine        Dose:  25 mg   Take 25 mg by mouth At Bedtime   Refills:  0        * SEROQUEL 25 MG tablet   Generic drug:  QUEtiapine        Dose:  25 mg   Take 25 mg by mouth every morning    Refills:  0        * SEROQUEL 25 MG tablet   Generic drug:  QUEtiapine        Dose:  12.5 mg   Take 12.5 mg by mouth every 12 hours as needed   Refills:  0        senna-docusate 8.6-50 MG per tablet   Commonly known as:  SENOKOT-S;PERICOLACE        Dose:  1 tablet   Take 1 tablet by mouth every 12 hours as needed for constipation   Refills:  0        * sodium chloride 0.65 % nasal spray   Commonly known as:  OCEAN        Dose:  2 spray   Spray 2 sprays into both nostrils 2 times daily   Refills:  0        * sodium chloride 0.65 % nasal spray   Commonly known as:  OCEAN        Dose:  2 spray   Spray 2 sprays into both nostrils every 8 hours as needed for congestion   Refills:  0        VITAMIN B 12 PO        Dose:  1000 mcg   Take 1,000 mcg by mouth daily   Refills:  0        VITAMIN D3 PO        Dose:  5000 Units   Take 5,000 Units by mouth daily   Refills:  0        * Notice:  This list has 9 medication(s) that are the same as other medications prescribed for you. Read the directions carefully, and ask your doctor or other care provider to review them with you.            Summary of Visit     Reason for your hospital stay       You were admitted for urinary infection and likely pneumonia.  We have treated this with IV antibiotics and now you have two more days of oral antibiotics to complete this course.             After Care     Activity - Up with nursing assistance       Assist of 2 with use of assistive devices as needed.       Advance Diet as Tolerated       Follow this diet upon discharge: Orders Placed This Encounter  Combination Diet Regular Diet Adult       Discharge Instructions       Please give seroquel right AFTER meals, not before so as to prevent sedation.       Fall precautions           General info for SNF       Length of Stay Estimate: Long Term Care  Condition at Discharge: Stable  Level of care:skilled   Rehabilitation Potential: Poor  Admission H&P remains valid and up-to-date: Yes  Recent  Chemotherapy: N/A  Use Nursing Home Standing Orders: Yes       Mantoux instructions       Give two-step Mantoux (PPD) Per Facility Policy Yes             Referrals     Occupational Therapy Adult Consult       Evaluate and treat as clinically indicated.    Reason:  Deconditioning       Physical Therapy Adult Consult       Evaluate and treat as clinically indicated.    Reason:  Deconditioning             Supplies     [employer]ie [employer]       [employer]             Follow-Up Appointment Instructions     Future Labs/Procedures    Follow Up and recommended labs and tests     Comments:    Follow up with Nursing home physician for re-evaluation.      Follow-Up Appointment Instructions     Follow Up and recommended labs and tests       Follow up with Nursing home physician for re-evaluation.             Statement of Approval     Ordered          01/21/17 1033  I have reviewed and agree with all the recommendations and orders detailed in this document.   EFFECTIVE NOW     Approved and electronically signed by:  Cortes Anna MD           01/20/17 8442  I have reviewed and agree with all the recommendations and orders detailed in this document.   EFFECTIVE NOW     Approved and electronically signed by:  Cortes Anna MD

## 2017-01-16 NOTE — IP AVS SNAPSHOT
` ` Patient Information     Patient Name Sex     Eder Pearson ([id number]) Male 1933       Room Bed    0308 0308-      Patient Demographics     Address Phone    [address] [address] [phone] (Home)  none (Work)  [phone] (Mobile)      Patient Ethnicity & Race     Ethnic Group Patient Race    American White      Emergency Contact(s)     Name Relation Home Work Mobile    Lisa Pearson Daughter [phone] none [phone]    Loyd Pearson Son [phone] none [phone]    Montez Bets Relative [phone] none [phone]      Documents on File        Status Date Received Description       Documents for the Patient    Affiliate Privacy placeholder   phase3    Consent for Services - Hospital/Clinic Received 01/27/15     Consent for EHR Access Received 01/27/15     Privacy Notice - [place] Received 01/27/15     Insurance Card Received 01/27/15     External Medication Information Consent       Patient ID Received 17 EXP 2020    [facility] Specified Other       HIM KIRK Authorization  02/24/15 [facility] - 2015    Insurance Card Received 17     Consent for Services/Privacy Notice - Hospital/Clinic Received 16     Advance Directives and Living Will Received 16 POLST 2015    HIM KIRK Authorization  16 [facility]       Documents for the Encounter    CMS IM for Patient Signature Received 17       Admission Information     Attending Provider Admitting Provider Admission Type Admission Date/Time    Rudy Grey MD [place], Daniel L, MD Emergency 17    Discharge Date Hospital Service Auth/Cert Status Service Area     General Medicine [facility]    Unit Room/Bed Admission Status     3 MEDICAL SURGICAL 308/030- Admission (Confirmed)            Admission     Complaint    HAP (hospital-acquired pneumonia)      Hospital Account     Name Acct ID Class Status  Primary Coverage    MichelEder [id number] Inpatient Open MEDICARE - MEDICARE            Guarantor Account (for Hospital Account #[id number])     Name Relation to Pt Service Area Active? Acct Type    Eder Pearson  FCS Yes Personal/Family    Address Phone          [address] [place] [address] [phone](H)  none(O)              Coverage Information (for Hospital Account #[id number])     1. MEDICARE/MEDICARE     F/O Payor/Plan Precert #    MEDICARE/MEDICARE     Subscriber Subscriber #    Michel Eder HODGES [id number]    Address Phone    [employer]  [address]  [place] [address] [phone]          2. COMMERCIAL/AARP     F/O Payor/Plan Precert #    COMMERCIAL/AARP     Subscriber Subscriber #    Michel Eder HODGES [id number]    Address Phone    [employer] [facility] [employer]  [address]  [place] [id number] [phone]

## 2017-01-17 LAB
ANION GAP SERPL CALCULATED.3IONS-SCNC: 9 MMOL/L (ref 3–14)
BASOPHILS # BLD AUTO: 0 10E9/L (ref 0–0.2)
BASOPHILS NFR BLD AUTO: 0.2 %
BUN SERPL-MCNC: 28 MG/DL (ref 7–30)
CALCIUM SERPL-MCNC: 8 MG/DL (ref 8.5–10.1)
CHLORIDE SERPL-SCNC: 108 MMOL/L (ref 94–109)
CO2 SERPL-SCNC: 24 MMOL/L (ref 20–32)
CREAT SERPL-MCNC: 1.16 MG/DL (ref 0.66–1.25)
DIFFERENTIAL METHOD BLD: ABNORMAL
EOSINOPHIL # BLD AUTO: 0 10E9/L (ref 0–0.7)
EOSINOPHIL NFR BLD AUTO: 0.2 %
ERYTHROCYTE [DISTWIDTH] IN BLOOD BY AUTOMATED COUNT: 18 % (ref 10–15)
FLUAV+FLUBV AG SPEC QL: NEGATIVE
FLUAV+FLUBV AG SPEC QL: NORMAL
GFR SERPL CREATININE-BSD FRML MDRD: 60 ML/MIN/1.7M2
GLUCOSE SERPL-MCNC: 109 MG/DL (ref 70–99)
HCT VFR BLD AUTO: 28.1 % (ref 40–53)
HGB BLD-MCNC: 9.1 G/DL (ref 13.3–17.7)
IMM GRANULOCYTES # BLD: 0.1 10E9/L (ref 0–0.4)
IMM GRANULOCYTES NFR BLD: 0.4 %
INTERPRETATION ECG - MUSE: NORMAL
LACTATE BLD-SCNC: 0.9 MMOL/L (ref 0.7–2.1)
LYMPHOCYTES # BLD AUTO: 1 10E9/L (ref 0.8–5.3)
LYMPHOCYTES NFR BLD AUTO: 7.6 %
MCH RBC QN AUTO: 32.5 PG (ref 26.5–33)
MCHC RBC AUTO-ENTMCNC: 32.4 G/DL (ref 31.5–36.5)
MCV RBC AUTO: 100 FL (ref 78–100)
MONOCYTES # BLD AUTO: 0.8 10E9/L (ref 0–1.3)
MONOCYTES NFR BLD AUTO: 6.6 %
NEUTROPHILS # BLD AUTO: 10.6 10E9/L (ref 1.6–8.3)
NEUTROPHILS NFR BLD AUTO: 85 %
NRBC # BLD AUTO: 0 10*3/UL
NRBC BLD AUTO-RTO: 0 /100
PLATELET # BLD AUTO: 184 10E9/L (ref 150–450)
POTASSIUM SERPL-SCNC: 3.7 MMOL/L (ref 3.4–5.3)
RBC # BLD AUTO: 2.8 10E12/L (ref 4.4–5.9)
SODIUM SERPL-SCNC: 141 MMOL/L (ref 133–144)
SPECIMEN SOURCE: NORMAL
WBC # BLD AUTO: 12.5 10E9/L (ref 4–11)

## 2017-01-17 PROCEDURE — 80048 BASIC METABOLIC PNL TOTAL CA: CPT | Performed by: INTERNAL MEDICINE

## 2017-01-17 PROCEDURE — 25000128 H RX IP 250 OP 636: Performed by: EMERGENCY MEDICINE

## 2017-01-17 PROCEDURE — 25000132 ZZH RX MED GY IP 250 OP 250 PS 637: Mod: GY | Performed by: INTERNAL MEDICINE

## 2017-01-17 PROCEDURE — 25000308 HC RX OP HPI UCR WEL MED 250 IP 250: Performed by: INTERNAL MEDICINE

## 2017-01-17 PROCEDURE — 83605 ASSAY OF LACTIC ACID: CPT | Performed by: INTERNAL MEDICINE

## 2017-01-17 PROCEDURE — 25000125 ZZHC RX 250: Performed by: EMERGENCY MEDICINE

## 2017-01-17 PROCEDURE — A9270 NON-COVERED ITEM OR SERVICE: HCPCS | Mod: GY | Performed by: INTERNAL MEDICINE

## 2017-01-17 PROCEDURE — 36415 COLL VENOUS BLD VENIPUNCTURE: CPT | Performed by: INTERNAL MEDICINE

## 2017-01-17 PROCEDURE — 25000125 ZZHC RX 250: Performed by: INTERNAL MEDICINE

## 2017-01-17 PROCEDURE — 82565 ASSAY OF CREATININE: CPT | Performed by: INTERNAL MEDICINE

## 2017-01-17 PROCEDURE — 40000275 ZZH STATISTIC RCP TIME EA 10 MIN

## 2017-01-17 PROCEDURE — 85025 COMPLETE CBC W/AUTO DIFF WBC: CPT | Performed by: INTERNAL MEDICINE

## 2017-01-17 PROCEDURE — 94640 AIRWAY INHALATION TREATMENT: CPT

## 2017-01-17 PROCEDURE — 99233 SBSQ HOSP IP/OBS HIGH 50: CPT | Performed by: INTERNAL MEDICINE

## 2017-01-17 PROCEDURE — 12000000 ZZH R&B MED SURG/OB

## 2017-01-17 PROCEDURE — 25000128 H RX IP 250 OP 636: Performed by: INTERNAL MEDICINE

## 2017-01-17 RX ORDER — POTASSIUM CHLORIDE 7.45 MG/ML
10 INJECTION INTRAVENOUS
Status: DISCONTINUED | OUTPATIENT
Start: 2017-01-17 | End: 2017-01-21 | Stop reason: HOSPADM

## 2017-01-17 RX ORDER — ACETAMINOPHEN 500 MG
1000 TABLET ORAL DAILY PRN
Status: ON HOLD | COMMUNITY
End: 2019-01-01

## 2017-01-17 RX ORDER — ACETAMINOPHEN 500 MG
1000 TABLET ORAL 2 TIMES DAILY
COMMUNITY

## 2017-01-17 RX ORDER — POTASSIUM CHLORIDE 29.8 MG/ML
20 INJECTION INTRAVENOUS
Status: DISCONTINUED | OUTPATIENT
Start: 2017-01-17 | End: 2017-01-21 | Stop reason: HOSPADM

## 2017-01-17 RX ORDER — TAMSULOSIN HYDROCHLORIDE 0.4 MG/1
0.4 CAPSULE ORAL AT BEDTIME
Status: DISCONTINUED | OUTPATIENT
Start: 2017-01-17 | End: 2017-01-21 | Stop reason: HOSPADM

## 2017-01-17 RX ORDER — DULOXETIN HYDROCHLORIDE 30 MG/1
30 CAPSULE, DELAYED RELEASE ORAL DAILY
Status: ON HOLD | COMMUNITY
End: 2019-01-01

## 2017-01-17 RX ORDER — LEVOFLOXACIN 5 MG/ML
750 INJECTION, SOLUTION INTRAVENOUS
Status: DISCONTINUED | OUTPATIENT
Start: 2017-01-17 | End: 2017-01-19

## 2017-01-17 RX ORDER — POTASSIUM CHLORIDE 1.5 G/1.58G
20-40 POWDER, FOR SOLUTION ORAL
Status: DISCONTINUED | OUTPATIENT
Start: 2017-01-17 | End: 2017-01-21 | Stop reason: HOSPADM

## 2017-01-17 RX ORDER — ONDANSETRON 4 MG/1
4 TABLET, ORALLY DISINTEGRATING ORAL EVERY 6 HOURS PRN
Status: DISCONTINUED | OUTPATIENT
Start: 2017-01-17 | End: 2017-01-21 | Stop reason: HOSPADM

## 2017-01-17 RX ORDER — AMOXICILLIN 250 MG
1 CAPSULE ORAL EVERY 12 HOURS PRN
COMMUNITY

## 2017-01-17 RX ORDER — ALBUTEROL SULFATE 0.83 MG/ML
3 SOLUTION RESPIRATORY (INHALATION)
Status: DISCONTINUED | OUTPATIENT
Start: 2017-01-17 | End: 2017-01-21 | Stop reason: HOSPADM

## 2017-01-17 RX ORDER — NALOXONE HYDROCHLORIDE 0.4 MG/ML
.1-.4 INJECTION, SOLUTION INTRAMUSCULAR; INTRAVENOUS; SUBCUTANEOUS
Status: DISCONTINUED | OUTPATIENT
Start: 2017-01-17 | End: 2017-01-21 | Stop reason: HOSPADM

## 2017-01-17 RX ORDER — POTASSIUM CHLORIDE 1500 MG/1
20-40 TABLET, EXTENDED RELEASE ORAL
Status: DISCONTINUED | OUTPATIENT
Start: 2017-01-17 | End: 2017-01-21 | Stop reason: HOSPADM

## 2017-01-17 RX ORDER — MEMANTINE HYDROCHLORIDE 5 MG/1
10 TABLET ORAL DAILY
Status: DISCONTINUED | OUTPATIENT
Start: 2017-01-17 | End: 2017-01-21 | Stop reason: HOSPADM

## 2017-01-17 RX ORDER — AMOXICILLIN 250 MG
1-2 CAPSULE ORAL 2 TIMES DAILY PRN
Status: DISCONTINUED | OUTPATIENT
Start: 2017-01-17 | End: 2017-01-21 | Stop reason: HOSPADM

## 2017-01-17 RX ORDER — ACETAMINOPHEN 500 MG
1000 TABLET ORAL 2 TIMES DAILY
Status: DISCONTINUED | OUTPATIENT
Start: 2017-01-17 | End: 2017-01-21 | Stop reason: HOSPADM

## 2017-01-17 RX ORDER — SODIUM CHLORIDE 9 MG/ML
INJECTION, SOLUTION INTRAVENOUS CONTINUOUS
Status: ACTIVE | OUTPATIENT
Start: 2017-01-17 | End: 2017-01-17

## 2017-01-17 RX ORDER — BISACODYL 10 MG
10 SUPPOSITORY, RECTAL RECTAL DAILY PRN
Status: DISCONTINUED | OUTPATIENT
Start: 2017-01-17 | End: 2017-01-17

## 2017-01-17 RX ORDER — MENTHOL AND ZINC OXIDE .44; 20.625 G/100G; G/100G
OINTMENT TOPICAL EVERY 8 HOURS PRN
COMMUNITY
End: 2019-01-01

## 2017-01-17 RX ORDER — BISACODYL 10 MG
10 SUPPOSITORY, RECTAL RECTAL DAILY PRN
Status: DISCONTINUED | OUTPATIENT
Start: 2017-01-17 | End: 2017-01-21 | Stop reason: HOSPADM

## 2017-01-17 RX ORDER — QUETIAPINE FUMARATE 25 MG/1
12.5 TABLET, FILM COATED ORAL
Status: ON HOLD | COMMUNITY
End: 2017-05-13

## 2017-01-17 RX ORDER — QUETIAPINE FUMARATE 25 MG/1
12.5 TABLET, FILM COATED ORAL EVERY 12 HOURS PRN
Status: ON HOLD | COMMUNITY
End: 2019-01-01

## 2017-01-17 RX ORDER — DONEPEZIL HYDROCHLORIDE 10 MG/1
10 TABLET, FILM COATED ORAL AT BEDTIME
Status: DISCONTINUED | OUTPATIENT
Start: 2017-01-17 | End: 2017-01-17

## 2017-01-17 RX ORDER — HALOPERIDOL 5 MG/ML
1 INJECTION INTRAMUSCULAR EVERY 6 HOURS PRN
Status: DISCONTINUED | OUTPATIENT
Start: 2017-01-17 | End: 2017-01-18

## 2017-01-17 RX ORDER — PROCHLORPERAZINE MALEATE 5 MG
5 TABLET ORAL EVERY 6 HOURS PRN
Status: DISCONTINUED | OUTPATIENT
Start: 2017-01-17 | End: 2017-01-21 | Stop reason: HOSPADM

## 2017-01-17 RX ORDER — QUETIAPINE FUMARATE 25 MG/1
25 TABLET, FILM COATED ORAL AT BEDTIME
Status: DISCONTINUED | OUTPATIENT
Start: 2017-01-17 | End: 2017-01-21 | Stop reason: HOSPADM

## 2017-01-17 RX ORDER — QUETIAPINE FUMARATE 25 MG/1
25 TABLET, FILM COATED ORAL EVERY MORNING
Status: DISCONTINUED | OUTPATIENT
Start: 2017-01-17 | End: 2017-01-21 | Stop reason: HOSPADM

## 2017-01-17 RX ORDER — QUETIAPINE FUMARATE 25 MG/1
25 TABLET, FILM COATED ORAL EVERY MORNING
Status: ON HOLD | COMMUNITY
End: 2017-05-13

## 2017-01-17 RX ORDER — TAMSULOSIN HYDROCHLORIDE 0.4 MG/1
0.4 CAPSULE ORAL AT BEDTIME
COMMUNITY
End: 2019-01-01

## 2017-01-17 RX ORDER — ONDANSETRON 2 MG/ML
4 INJECTION INTRAMUSCULAR; INTRAVENOUS EVERY 6 HOURS PRN
Status: DISCONTINUED | OUTPATIENT
Start: 2017-01-17 | End: 2017-01-21 | Stop reason: HOSPADM

## 2017-01-17 RX ORDER — MICONAZOLE NITRATE 20 MG/G
CREAM TOPICAL EVERY 8 HOURS
COMMUNITY

## 2017-01-17 RX ORDER — DULOXETIN HYDROCHLORIDE 30 MG/1
30 CAPSULE, DELAYED RELEASE ORAL DAILY
Status: DISCONTINUED | OUTPATIENT
Start: 2017-01-17 | End: 2017-01-21 | Stop reason: HOSPADM

## 2017-01-17 RX ORDER — LANOLIN ALCOHOL/MO/W.PET/CERES
1000 CREAM (GRAM) TOPICAL DAILY
Status: DISCONTINUED | OUTPATIENT
Start: 2017-01-17 | End: 2017-01-21 | Stop reason: HOSPADM

## 2017-01-17 RX ORDER — ACETAMINOPHEN 325 MG/1
650 TABLET ORAL EVERY 4 HOURS PRN
Status: DISCONTINUED | OUTPATIENT
Start: 2017-01-17 | End: 2017-01-21 | Stop reason: HOSPADM

## 2017-01-17 RX ORDER — PROCHLORPERAZINE 25 MG
12.5 SUPPOSITORY, RECTAL RECTAL EVERY 12 HOURS PRN
Status: DISCONTINUED | OUTPATIENT
Start: 2017-01-17 | End: 2017-01-21 | Stop reason: HOSPADM

## 2017-01-17 RX ORDER — QUETIAPINE FUMARATE 25 MG/1
25 TABLET, FILM COATED ORAL AT BEDTIME
Status: ON HOLD | COMMUNITY
End: 2019-01-01

## 2017-01-17 RX ADMIN — TAZOBACTAM SODIUM AND PIPERACILLIN SODIUM 4.5 G: 500; 4 INJECTION, SOLUTION INTRAVENOUS at 13:14

## 2017-01-17 RX ADMIN — TAZOBACTAM SODIUM AND PIPERACILLIN SODIUM 4.5 G: 500; 4 INJECTION, SOLUTION INTRAVENOUS at 06:00

## 2017-01-17 RX ADMIN — SALINE NASAL SPRAY 2 SPRAY: 1.5 SOLUTION NASAL at 21:55

## 2017-01-17 RX ADMIN — ENOXAPARIN SODIUM 40 MG: 40 INJECTION SUBCUTANEOUS at 14:33

## 2017-01-17 RX ADMIN — DULOXETINE HYDROCHLORIDE 30 MG: 30 CAPSULE, DELAYED RELEASE ORAL at 10:01

## 2017-01-17 RX ADMIN — TAZOBACTAM SODIUM AND PIPERACILLIN SODIUM 4.5 G: 500; 4 INJECTION, SOLUTION INTRAVENOUS at 19:05

## 2017-01-17 RX ADMIN — MEMANTINE 10 MG: 5 TABLET ORAL at 10:01

## 2017-01-17 RX ADMIN — CYANOCOBALAMIN TAB 1000 MCG 1000 MCG: 1000 TAB at 10:00

## 2017-01-17 RX ADMIN — LEVOFLOXACIN 750 MG: 5 INJECTION, SOLUTION INTRAVENOUS at 00:12

## 2017-01-17 RX ADMIN — ALBUTEROL SULFATE 2.5 MG: 2.5 SOLUTION RESPIRATORY (INHALATION) at 04:30

## 2017-01-17 RX ADMIN — CHOLECALCIFEROL CAP 125 MCG (5000 UNIT) 5000 UNITS: 125 CAP at 10:03

## 2017-01-17 RX ADMIN — SODIUM CHLORIDE 1000 ML: 9 INJECTION, SOLUTION INTRAVENOUS at 00:13

## 2017-01-17 RX ADMIN — LEVOFLOXACIN 750 MG: 5 INJECTION, SOLUTION INTRAVENOUS at 23:26

## 2017-01-17 RX ADMIN — VANCOMYCIN HYDROCHLORIDE 2000 MG: 10 INJECTION, POWDER, LYOPHILIZED, FOR SOLUTION INTRAVENOUS at 01:32

## 2017-01-17 RX ADMIN — SODIUM CHLORIDE: 9 INJECTION, SOLUTION INTRAVENOUS at 14:32

## 2017-01-17 RX ADMIN — ACETAMINOPHEN 650 MG: 325 TABLET, FILM COATED ORAL at 08:21

## 2017-01-17 RX ADMIN — HALOPERIDOL LACTATE 1 MG: 5 INJECTION, SOLUTION INTRAMUSCULAR at 22:18

## 2017-01-17 NOTE — PROGRESS NOTES
[facility]  Hospitalist Progress Note  Nahid Arreola MD 01/17/2017    Reason for Stay (Diagnosis): Sepsis, pneumonia         Assessment and Plan:      Summary of Stay: Eder Pearson is a 83 year old male w/ hx of dementia, anxiety, CKD, and prostate cancer admitted from his memory care unit on 1/16/2017 with cough and fevers.  Found to have fever and leukocytosis.  Xray concerning for pneumonia.  Also with pyuria.  Initiated on broad spectrum abx with vancomycin, zosyn, and levofloxacin.  Quite somnolent form admission likely from sepsis.  Fever curve improving and vitally stable.     Problem List/Assessment and Plan:   Sepsis due to HCAP from possible GNRs and possible UTI: fevers, leukoctyosis, and cough.  Clinically with sepsis.  RUL infiltrated on xray.  Will treat as HCAP as he lives in a memory care unit.  UA with pyuria which will be covered by these abx.  Cultures obtained.  Vitals stable and repeat lactates not elevated.  -continue vancomycin/zosyn/levofloxacin today, may d/c vanco tomorrow if improved and no positive culture data to suggest ongoing need  -follow urine and blood cultures    Dementia: lives in memory care unit.  Currently somnolent.  Continue pta medications including cymbalta, namenda, and seroquel.    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  Febrile overnight.  Lactate not elevated.  Leukocytosis improving.  Patient has dementia and is quite somnolent so cannot provide much history.  Per his nurse he is a little more awake as the morning goes on.        "           Physical Exam:      Last Vital Signs:  /86 mmHg  Temp(Src) 98.1  F (36.7  C) (Axillary)  Resp 22  Ht 1.727 m (5' 8\")  Wt 86.864 kg (191 lb 8 oz)  BMI 29.12 kg/m2  SpO2 94%      Intake/Output Summary (Last 24 hours) at 01/17/17 1439  Last data filed at 01/17/17 1000   Gross per 24 hour   Intake    700 ml   Output      0 ml   Net    700 ml       Constitutional: somnolent, does not appear in distress  Eyes: sclera white   HEENT:   MMM  Respiratory:  ungs cta bilaterally, no crackles or wheeze  Cardiovascular: RRR.  No murmur   GI: non-tender, not distended, bowel sounds present  Skin: warm, no rash    Musculoskeletal/extremities:  No edema  Neurologic: somnolent, will open eyes to stimulus  Psychiatric: calm         Medications:      All current medications were reviewed with changes reflected in problem list.         Data:      All new lab and imaging data was reviewed.   Labs:    Recent Labs  Lab 01/16/17  2325 01/16/17  2252 01/16/17  2158   CULT No growth after 8 hours Culture in progress No growth after 8 hours       Recent Labs  Lab 01/17/17  0643 01/16/17  2158   WBC 12.5* 14.1*   HGB 9.1* 9.3*   HCT 28.1* 28.8*    98    226       Recent Labs  Lab 01/17/17  0643 01/16/17  2158    140   POTASSIUM 3.7 4.0   CHLORIDE 108 106   CO2 24 24   ANIONGAP 9 10   * 116*   BUN 28 32*   CR 1.16 1.28*   GFRESTIMATED 60* 54*   GFRESTBLACK 73 65   ARANZA 8.0* 8.2*   PROTTOTAL  --  6.7*   ALBUMIN  --  2.9*   BILITOTAL  --  1.1   ALKPHOS  --  47   AST  --  23   ALT  --  14    lactate 0.9    Imaging:   Recent Results (from the past 24 hour(s))   XR Chest Port 1 View    Narrative    XR CHEST PORT 1 VW  1/16/2017 11:07 PM      HISTORY: Fever.     COMPARISON: None.    FINDINGS: Supine portable chest. The heart size is normal. Thoracic  aorta is calcified. There is a small infiltrate in the right upper  lobe inferiorly. The lungs are otherwise clear. No pneumothorax.      Impression    " IMPRESSION: Small right upper lobe infiltrate may be pneumonia.         Nahid Arreola MD

## 2017-01-17 NOTE — ED PROVIDER NOTES
"  History   Chief Complaint:  Fever      HPI   The patient's history is limited secondary to dementia and was supplemented by nursing report    Eder Pearson is an 83 year old male with a history of dementia and prostate cancer who presents to the emergency department via EMS for evaluation of a fever. Per nursing report, patient had a fever all day that was 102 with intermittent coughing. He has otherwise been at his baseline mental status. No other concerns or complaints at this time. Patient was given duo neb en route by EMS.     Allergies:  NKDA     Medications:     RISPERIDONE M-TAB PO  Donepezil HCl   TAMSULOSIN HCL PO  Memantine HCl  Quetiapine fumarate  Duloxetine    Past Medical History:     Anxiety  Prostate Cancer  Dementia      Past Surgical History:     The patient does not have any pertinent past surgical history.      Family History:     No past pertinent family history.     Social History:  Presents alone.  Negative for tobacco use.  Negative for alcohol use.  Marital Status:       Review of Systems   Unable to perform ROS: Dementia     Physical Exam   First Vitals:  BP: 123/61 mmHg  Temp: 100.7  F (38.2  C)  Temp src: Rectal  SpO2: 89 %  Height: 172.7 cm (5' 8\")  Weight: 90.719 kg (200 lb)    Physical Exam  Constitutional: The patient is alert, but pleasantly demented.  HENT:   Right Ear: External ear normal.   Left Ear: External ear normal.   Nose: Nose normal.   Mouth/Throat: Uvula is midline, oropharynx is clear and moist and mucous membranes are normal. No posterior oropharyngeal edema or erythema.   Eyes: Conjunctivae, EOM and lids are normal. Pupils are equal, round, and reactive to light.   Neck: Trachea normal. Normal range of motion. Neck supple.   Cardiovascular: Normal rate, regular rhythm, normal heart sounds, and intact distal pulses.    Pulmonary/Chest: Effort normal and breath sounds equal bilaterally. No crackles or wheezing.   Abdominal: Soft. No tenderness. No rebound and " no guarding.   Musculoskeletal: Normal range of motion.  No extremity tenderness or edema.  Neurological: Alert, but pleasantly demented. Moves all extremities equally.   Skin: Skin is dry. No rash noted.          Emergency Department Course   ECG:  Indication: Fever   Time: 2219  Vent. Rate 84 bpm. CT interval 174. QRS duration 130. QT/QTc 416/491. P-R-T axis 35 -58 31. Sinus rhythm with premature atrial complexes, right bundle branch block, left anterior fascicular block, inferior infarct, age undetermined, abnormal ECG Read time: 2223    Imaging:  Radiographic findings were communicated with the patient who voiced understanding of the findings.  XR Chest port 1  Small right upper lobe infiltrate may be pneumonia. As per radiology.     Laboratory:  UA with micro: Small blood, protein albumin 30, positive nitrite, large Leukocyte Esterase, WBC/HPF >182(H), RBC/HPF 6(H), many bacteria, mucous present o/w negative  CBC: WBC: 14.1(H), HGB: 9.3(L), PLT: 226  CMP: Creatinine: 1.28(H), Glucose 116(H), BUN 32(H), Calcium 8.2(L), Albumin 2.9(L), GFR 54(L), Protein total 6.7(L), o/w WNL   Blood gas venous: Ph 7.44(H), PCO2 35(L), PO2 167(H)    Lactic acid: 1.2  Blood culture: pending   INR: 1.25(H)  PTT: 40(H)  Influenza A/B antigen: Negative   ISTAT gases: pCO2 33(L), PO2 58(L), O2 sat arterial 91(L)    Interventions:  2337 0.9% sodium chloride bolus IV  2337 Zosyn 4.5g IV  0012 Levaquin 750mg IV  0037 Vancomycin 2g IV    Emergency Department Course:  Nursing notes and vitals reviewed. I performed an exam of the patient as documented above.     Blood drawn. This was sent to the lab for further testing, results above.    The patient provided a urine sample here in the emergency department. This was sent for laboratory testing, findings above.     The patient was sent for x-ray while in the emergency department, findings above.     2342 I consulted with Dr. Grey, hospitalist services     Findings and plan explained to the  admitting physician who consents to admission. Discussed the patient with Dr. Grey, who will admit the patient to a [facility] bed for further monitoring, evaluation, and treatment.    Impression & Plan    Medical Decision Making:  Eder Pearson is an 83 year old male with a history of dementia who presents via EMS from his memory care unit for evaluation of fever. Upon presentation in the ED, patient is non-toxic appearing. He is febrile and hypoxic with an oxygen saturation of 89% on room air, but vitals are otherwise within normal limits and stable. His oxygen saturation improved with oxygen via nasal cannula. On exam, he is alert, but pleasantly demented. His neurologic exam is non-focal. Cardiac exam is unremarkable. Lungs are clear on auscultation bilaterally. Abdomen is soft and non-tender throughout. The rest of his exam is as mentioned above. Given that the patient is febrile, septic work up was performed. His labs were obtained and are as mentioned above. Notably, he does have a mild leukocytosis, but lactate is within normal limits. Urinalysis is concerning for a urinary tract infection. Chest x-ray was also obtained and demonstrates a small right upper lobe infiltrate, concerning for pneumonia. Given these findings, the patient does meet SIRs criteria with sepsis. Therefore, the patient was initiated on Vancomycin, Levaquin, and Zosyn. He will be admitted to the hospitalist service for further evaluation and management. The hospitalist agreed to accept this patient on to his service. He was stable/improved at the time of admission.     Diagnosis:    ICD-10-CM    1. Urinary tract infection, site unspecified N39.0 Urine Culture Aerobic Bacterial     Blood culture     Blood culture     Basic metabolic panel     CBC with platelets differential     Lactic acid level STAT     CANCELED: Platelet count     CANCELED: Creatinine     CANCELED: Creatinine   2. Pneumonia of right lower lobe due to infectious  organism J18.9        Discharge Medications:  Current Discharge Medication List        Iesha Said  1/16/2017   [facility] EMERGENCY DEPARTMENT    I, Iesha Said, am serving as a scribe on 1/16/2017 at 10:29 PM to personally document services performed by Any Dobbs MD based on my observations and the provider's statements to me.       Any Dobbs MD  01/17/17 1507

## 2017-01-17 NOTE — CONSULTS
Care Transition Initial Assessment - RN    Reason For Consult: care coordination/care conference, discharge planning   Met with: Patient and Family.    DATA   Active Problems:    Urinary frequency    HAP (hospital-acquired pneumonia)     PNA Action plan given.     Cognitive Status: confused.  Primary Care Clinic Name: [facility]     Contact information and PCP information verified: Yes    Lives With: facility resident  Living Arrangements: assisted living     Description of Support System: Supportive, Involved   Who is your support system?: Facility resident(s)/Staff         Insurance concerns: No Insurance issues identified    ASSESSMENT  Patient currently receives the following services:  All. Assistance with dressing, incontinence care, med administration, all meals. Assist of 2 for transfers when ill.         Identified issues/concerns regarding health management: No issues raised. Family requests wheelchair van transport at discharge.     PLAN  Patient anticipates discharging back to [facility].      Patient anticipates needs for home equipment: No. Uses a 2ww, has wheelchair available if needed.     Plan/Disposition: Memory Care     Appointments: None set up. Patient is followed by [facility].     CM will continue to follow patient until discharge for any additional needs.     Kath Dooley RN, BSN, CTS  [facility]  [phone]

## 2017-01-17 NOTE — PLAN OF CARE
Problem: Goal Outcome Summary  Goal: Goal Outcome Summary  Outcome: No Change  VSS. Disoriented X4. Speech very garbled (baseline according to daughter). Only says one word answers. Very drowsy, slept most of the day. Morning Seroquel held. Febrile at times with temp as high as 101.6 axillary. Oral tylenol given X1. Temp now stable. Lung sounds coarse. Non-productive wet cough. Sepsis protocol triggered. LA 0.9. Tranfers with lift due to drowsiness. Up in chair all morning. Requires help with feeding. Incontinent of bowel and bladder. Had 2 small loose stools. Normal saline running at 75 ml/hr.

## 2017-01-17 NOTE — PHARMACY-ADMISSION MEDICATION HISTORY
Admission medication history interview status for this patient is complete. See [facility] admission navigator for allergy information, prior to admission medications and immunization status.     Medication history interview source(s):[facility]  Medication history resources (including written lists, pill bottles, clinic record):[facility]  Primary pharmacy:[facility] [place]    Changes made to PTA medication list:  Added: duloxetin, fiber, seroquel at noon, seroquel in evening, seroquel qhs, seroquel qam, scheduled and prn ocean nasal spray, scheduled and prn apap, miles, prn seroquel, senna/docusate, preparation h, calmoseptine  Deleted: aricept 10mg qhs, risperidone  Changed: flomax from bid to daily per MAR, added frequency to b12 per MAR,     Actions taken by pharmacist (provider contacted, etc):None     Additional medication history information:None    Medication reconciliation/reorder completed by provider prior to medication history? Yes, sticky note left for MD      Prior to Admission medications    Medication Sig Last Dose Taking? Auth Provider   tamsulosin (FLOMAX) 0.4 MG capsule Take 0.4 mg by mouth At Bedtime 1/16/2017 at hs Yes Unknown, Entered By History   QUEtiapine (SEROQUEL) 25 MG tablet Take 12.5 mg by mouth daily (with dinner) 1/16/2017 at 1700 Yes Unknown, Entered By History   DULoxetine (CYMBALTA) 30 MG EC capsule Take 30 mg by mouth daily 1/16/2017 at am Yes Unknown, Entered By History   Psyllium (NATURAL FIBER PO) Take 1 tsp. by mouth daily 1/16/2017 at am Yes Unknown, Entered By History   QUEtiapine (SEROQUEL) 25 MG tablet Take 25 mg by mouth At Bedtime 1/16/2017 at 2000 Yes Unknown, Entered By History   QUEtiapine (SEROQUEL) 25 MG tablet Take 25 mg by mouth every morning 1/16/2017 at am Yes Unknown, Entered By History   sodium chloride (OCEAN) 0.65 % nasal spray Spray 2 sprays into both nostrils 2 times daily 1/16/2017 at 2100 Yes Unknown, Entered By History   acetaminophen (TYLENOL) 500 MG  tablet Take 1,000 mg by mouth 2 times daily 1/16/2017 at 1700 Yes Unknown, Entered By History   miconazole with skin protectant (LOPEZ ANTIFUNGAL) 2 % CREA cream Apply topically every 8 hours Apply to buttocks topically every shift for barrier cream with each toileting 1/16/2017 at evening Yes Unknown, Entered By History   QUEtiapine (SEROQUEL) 25 MG tablet Take 12.5 mg by mouth every 12 hours as needed no recent usage Yes Unknown, Entered By History   sodium chloride (OCEAN) 0.65 % nasal spray Spray 2 sprays into both nostrils every 8 hours as needed for congestion no recent usage Yes Unknown, Entered By History   senna-docusate (SENOKOT-S;PERICOLACE) 8.6-50 MG per tablet Take 1 tablet by mouth every 12 hours as needed for constipation no recent usage Yes Unknown, Entered By History   acetaminophen (TYLENOL) 500 MG tablet Take 1,000 mg by mouth daily as needed for mild pain no recent usage Yes Unknown, Entered By History   phenylephrine-shark liver oil-mineral oil-petrolatum (PREPARATION H) 0.25-14-74.9 % rectal ointment Place rectally daily as needed for hemorrhoids no recent usage Yes Unknown, Entered By History   menthol-zinc oxide (CALMOSEPTINE) 0.44-20.625 % OINT ointment Apply topically every 8 hours as needed for skin protection no recent usage Yes Unknown, Entered By History   QUETIAPINE FUMARATE PO Take 12.5 mg by mouth daily (with lunch)  1/16/2017 at 1200 Yes Reported, Patient   Cyanocobalamin (VITAMIN B 12 PO) Take 1,000 mcg by mouth daily  1/16/2017 at am Yes Reported, Patient   bisacodyl (DULCOLAX) 10 MG Suppository Place 10 mg rectally daily as needed for constipation no recent usage Yes Reported, Patient   Cholecalciferol (VITAMIN D3 PO) Take 5,000 Units by mouth daily 1/16/2017 at am Yes Reported, Patient   Memantine HCl (NAMENDA PO) Take 10 mg by mouth daily 1/16/2017 at am Yes Reported, Patient

## 2017-01-17 NOTE — PHARMACY-VANCOMYCIN DOSING SERVICE
Pharmacy Vancomycin Initial Note  Date of Service 2017  Patient's  1933  83 year old, male    Indication: [facility]ated Pneumonia    Current estimated CrCl = Estimated Creatinine Clearance: 46.9 mL/min (based on Cr of 1.28).    Creatinine for last 3 days  2017:  9:58 PM Creatinine 1.28 mg/dL*    Recent Vancomycin Level(s) for last 3 days  No results found for requested labs within last 3 days.      Vancomycin IV Administrations (past 72 hours)                   vancomycin (VANCOCIN) 2,000 mg in NaCl 0.9 % 500 mL intermittent infusion (mg) 2,000 mg New Bag 17 0132                Nephrotoxins and other renal medications (Future)    Start     Dose/Rate Route Frequency Ordered Stop    17 0100  vancomycin (VANCOCIN) 1,750 mg in NaCl 0.9 % 500 mL intermittent infusion      1,750 mg Intravenous EVERY 24 HOURS 17 0241      17 0600  piperacillin-tazobactam (ZOSYN) intermittent infusion 4.5 g      4.5 g  200 mL/hr over 30 Minutes Intravenous EVERY 6 HOURS 17 0205            Contrast Orders - past 72 hours     None                Plan:  1.  Start vancomycin  1750 mg IV q24h,   2.  Goal Trough Level: 15-20 mg/L   3.  Pharmacy will check trough levels as appropriate in 1-3 Days.    4. Serum creatinine levels will be ordered daily for the first week of therapy and at least twice weekly for subsequent weeks.    5. [place] method utilized to dose vancomycin therapy: Method 1    [facility]Penik[facility]

## 2017-01-17 NOTE — ED NOTES
From [facility] has had fever all day  Cough  And some wheezing  Here for eval   occasional cough  Lungs clear except diminished at bases but does not take deep breath    Iv per ems  Pt can be combative with cares

## 2017-01-17 NOTE — ED NOTES
Bed: ED11  Expected date: 1/16/17  Expected time: 9:52 PM  Means of arrival: Ambulance  Comments:  HE. 89M. Fever. wheezing

## 2017-01-17 NOTE — PROVIDER NOTIFICATION
"Dr. Doss paged at 9050: \"Pt is lethargic and needing help eating. Do you want to change diet to dysphagia? Or have ST come see him? Or both? Also, he is normally up with 1/walker. Using LIFT and turning here. Want PT/OT eval? Thx.\"  "

## 2017-01-17 NOTE — ED NOTES
Pt  Is oriented to self  Lungs slight diminished in the bases  occasional cough  On oxygen at 2 liters in ed  ra sat 89  Now 94  Labs xray completed  Now with out being stimulated pt is lying quietly

## 2017-01-17 NOTE — PLAN OF CARE
Problem: Goal Outcome Summary  Goal: Goal Outcome Summary  Outcome: No Change  Took over care of pt shortly before 0200. Pt. Disoriented x4. VSS, absent nonverbal indicators of pain. Transfers with a lift and assist of x2. Repositioned Q2hrs. Pt. Can be combative at times, especially with repositioning and cares. Pt. Attempting to punch, grab and kick at staff. MD alexis, no intervention. Pt. Calms with soft spoken voice and reassurance. LS- coarse, wet and congested. Infrequent cough. PRN neb administered x1. O2 at 2lpm via NC.  Sputum culture needed, but d/t pt hx and combativeness, difficult to obtain. NS infusing. x2 BM's, incontinent of urine. Receiving Abx Vanco, levaquin, and zosyn.

## 2017-01-17 NOTE — H&P
[facility]  History and Physical   Hospitalist Service    Rudy Grey MD    Eder Pearson MRN# [id number]   YOB: 1933 Age: 83 year old      Date of Admission:  1/16/2017           Assessment and Plan:   Patient is an 83-year-old male with history of dementia, anxiety, prostate cancer, and benign prostatic hypertrophy.  He was sent to the emergency department by ambulance from a [facility] for evaluation of fever to 102 degrees, cough, and wheezing.  Emergency department evaluation suggested right upper lobe pneumonia, urinary tract infection, and sepsis.    Problem list:  1. Sepsis due to right upper lobe pneumonia and/or urinary tract infection.  Eder was started on Levaquin, Zosyn, and vancomycin for healthcare associated pneumonia in the emergency department. This will be continued.  This should cover pneumonia as well as urinary tract infection. Follow cultures.  Unless cultures dictate otherwise, Zosyn and vancomycin could likely be discontinued in the next 24-48 hours.    2.  Right upper lobe pneumonia. This is health care associated.  See discussion above.    3.  Urinary tract infection.  See discussion above.    4.  Dementia.  Continue medications for dementia, agitation, and anxiety (Donezepil, Namenda, and Seroquel).    5.  Benign prostatic hypertrophy.  Continue Flomax.    Full code as per memory care orders.  Lovenox for DVT prophylaxis  Disposition.  Admit as inpatient           Code Status:   Full Code         Primary Care Physician:   Janae Jauregui Physician None         Chief Complaint:   Cough, wheezing, and fever    History is obtained from Dr. Dobbs and the medical record.         History of Present Illness:   Eder Pearson is an 83-year-old male with history of dementia, anxiety, prostate cancer, and benign prostatic hypertrophy.  He was sent to the emergency department by ambulance from a [facility] for evaluation of fever to 102 degrees,  cough, and wheezing.  Emergency department evaluation Showed a temperature of 100.7.  Temperature had been 102 at [facility]. Oxygen saturations were 89% on room air.  Laboratory evaluation showed leukocytosis with white blood cell count of 14.1.  Lactic acid level was normal.  BUN and creatinine were 32 and 1.28 respectively, which seems to be his baseline.  Liver function tests were normal. Urinalysis showed greater than 182 white blood cells per high-power field, positive nitrites, and large leukocyte esterase.  Blood cultures and urine cultures are pending. CXR suggested right upper lobe pneumonia.  Eder was not able to provide much history because of his dementia.  I was asked to admit him to the hospital for treatment of sepsis due to healthcare associated pneumonia and urinary tract infection.           Past Medical History:     Patient Active Problem List   Diagnosis     Urinary frequency     HAP (hospital-acquired pneumonia)      Past Medical History   Diagnosis Date     Anxiety      Prostate cancer (H)      Dementia              Past Surgical History:   History reviewed. No pertinent past surgical history.         Home Medications:     Prior to Admission medications    Medication Sig Last Dose Taking? Auth Provider   QUETIAPINE FUMARATE PO Take 12.5 mg by mouth 3 times daily  Yes Reported, Patient   Cyanocobalamin (VITAMIN B 12 PO) Take 1,000 mcg by mouth  Yes Reported, Patient   bisacodyl (DULCOLAX) 10 MG Suppository Place 10 mg rectally daily as needed for constipation Past Week at Unknown time Yes Reported, Patient   Donepezil HCl (ARICEPT PO) Take 10 mg by mouth At Bedtime  1/16/2017 at Unknown time Yes Reported, Patient   Cholecalciferol (VITAMIN D3 PO) Take 5,000 Units by mouth daily 1/16/2017 at Unknown time Yes Reported, Patient   TAMSULOSIN HCL PO Take 0.4 mg by mouth 2 times daily 1/16/2017 at Unknown time Yes Reported, Patient   Memantine HCl (NAMENDA PO) Take 10 mg by mouth daily 1/16/2017  "at Unknown time Yes Reported, Patient   RISPERIDONE M-TAB PO    Reported, Patient            Allergies:   No Known Allergies         Social History:     Social History   Substance Use Topics     Smoking status: Never Smoker      Smokeless tobacco: Not on file     Alcohol Use: No             Family History:   Patient is unable to provide family history due to dementia           Review of Systems:   See history above.  Patient is unable to provide additional review of systems due to dementia.           Physical Exam:   Blood pressure 126/54, temperature 100.7  F (38.2  C), temperature source Rectal, height 1.727 m (5' 8\"), weight 90.719 kg (200 lb), SpO2 96 %.  200 lbs 0 oz      GENERAL: sleeping but easily awakened.  Unable to really answer questions.. No acute distress.  EYES: Pupils equal and round. No scleral erythema or icterus.  ENT: External ears are normal without deformity. Posterior oropharynx is without erythem, swelling, or exudate.  NECK: Supple. No masses or swelling. No tenderness. Thyroid is normal without mass or tenderness.  CHEST: Clear to auscultation. Normal breath sounds. No retractions.   CV: Regular rate and rhythm. No JVD. Pulses normal.  ABDOMEN: Bowel sounds present. No tenderness. No masses or hernia.  EXTREMETIES: No clubbing, cyanosis, or ischemia.  SKIN: Warm and dry to touch. No wounds or rashes.  NEUROLOGIC: Strength and sensation are normal. Deep tendon reflexes are normal. Cranial nerves are normal.             Data:   All new lab and imaging data was reviewed.     Results for orders placed or performed during the hospital encounter of 01/16/17 (from the past 24 hour(s))   CBC with platelets differential   Result Value Ref Range    WBC 14.1 (H) 4.0 - 11.0 10e9/L    RBC Count 2.93 (L) 4.4 - 5.9 10e12/L    Hemoglobin 9.3 (L) 13.3 - 17.7 g/dL    Hematocrit 28.8 (L) 40.0 - 53.0 %    MCV 98 78 - 100 fl    MCH 31.7 26.5 - 33.0 pg    MCHC 32.3 31.5 - 36.5 g/dL    RDW 18.2 (H) 10.0 - 15.0 % "    Platelet Count 226 150 - 450 10e9/L    Diff Method Automated Method     % Neutrophils 74.1 %    % Lymphocytes 17.8 %    % Monocytes 7.5 %    % Eosinophils 0.1 %    % Basophils 0.2 %    % Immature Granulocytes 0.3 %    Nucleated RBCs 0 0 /100    Absolute Neutrophil 10.5 (H) 1.6 - 8.3 10e9/L    Absolute Lymphocytes 2.5 0.8 - 5.3 10e9/L    Absolute Monocytes 1.1 0.0 - 1.3 10e9/L    Absolute Eosinophils 0.0 0.0 - 0.7 10e9/L    Absolute Basophils 0.0 0.0 - 0.2 10e9/L    Abs Immature Granulocytes 0.0 0 - 0.4 10e9/L    Absolute Nucleated RBC 0.0    Comprehensive metabolic panel   Result Value Ref Range    Sodium 140 133 - 144 mmol/L    Potassium 4.0 3.4 - 5.3 mmol/L    Chloride 106 94 - 109 mmol/L    Carbon Dioxide 24 20 - 32 mmol/L    Anion Gap 10 3 - 14 mmol/L    Glucose 116 (H) 70 - 99 mg/dL    Urea Nitrogen 32 (H) 7 - 30 mg/dL    Creatinine 1.28 (H) 0.66 - 1.25 mg/dL    GFR Estimate 54 (L) >60 mL/min/1.7m2    GFR Estimate If Black 65 >60 mL/min/1.7m2    Calcium 8.2 (L) 8.5 - 10.1 mg/dL    Bilirubin Total 1.1 0.2 - 1.3 mg/dL    Albumin 2.9 (L) 3.4 - 5.0 g/dL    Protein Total 6.7 (L) 6.8 - 8.8 g/dL    Alkaline Phosphatase 47 40 - 150 U/L    ALT 14 0 - 70 U/L    AST 23 0 - 45 U/L   Lactic acid whole blood   Result Value Ref Range    Lactic Acid 1.2 0.7 - 2.1 mmol/L   Blood culture   Result Value Ref Range    Specimen Description Blood Right Hand     Culture Micro No growth after 1 hour     Micro Report Status Pending    INR   Result Value Ref Range    INR 1.25 (H) 0.86 - 1.14   Partial thromboplastin time   Result Value Ref Range    PTT 40 (H) 22 - 37 sec   Influenza A/B antigen   Result Value Ref Range    Influenza A/B Agn Specimen Nasal     Influenza A Negative NEG    Influenza B  NEG     Negative   Test results must be correlated with clinical data. If necessary, results   should be confirmed by a molecular assay or viral culture.     ISTAT gases lactate art POCT   Result Value Ref Range    pH Arterial 7.43 7.35 -  7.45 pH    pCO2 Arterial 33 (L) 35 - 45 mm Hg    pO2 Arterial 58 (L) 80 - 105 mm Hg    Bicarbonate Arterial 22 21 - 28 mmol/L    O2 Sat Arterial 91 (L) 92 - 100 %    Lactic Acid 0.9 0.7 - 2.1 mmol/L   Blood gas venous   Result Value Ref Range    Ph Venous 7.44 (H) 7.32 - 7.43 pH    PCO2 Venous 35 (L) 40 - 50 mm Hg    PO2 Venous 167 (H) 25 - 47 mm Hg    Bicarbonate Venous 24 21 - 28 mmol/L    Base Deficit Venous 0.3 mmol/L    FIO2 NC  2 L      UA with Microscopic   Result Value Ref Range    Color Urine Yellow     Appearance Urine Slightly Cloudy     Glucose Urine Negative NEG mg/dL    Bilirubin Urine Negative NEG    Ketones Urine Negative NEG mg/dL    Specific Gravity Urine 1.019 1.003 - 1.035    Blood Urine Small (A) NEG    pH Urine 6.5 5.0 - 7.0 pH    Protein Albumin Urine 30 (A) NEG mg/dL    Urobilinogen mg/dL Normal 0.0 - 2.0 mg/dL    Nitrite Urine Positive (A) NEG    Leukocyte Esterase Urine Large (A) NEG    Source Catheterized Urine     WBC Urine >182 (H) 0 - 2 /HPF    RBC Urine 6 (H) 0 - 2 /HPF    Bacteria Urine Many (A) NEG /HPF    Mucous Urine Present (A) NEG /LPF   XR Chest Port 1 View    Narrative    XR CHEST PORT 1 VW  1/16/2017 11:07 PM      HISTORY: Fever.     COMPARISON: None.    FINDINGS: Supine portable chest. The heart size is normal. Thoracic  aorta is calcified. There is a small infiltrate in the right upper  lobe inferiorly. The lungs are otherwise clear. No pneumothorax.      Impression    IMPRESSION: Small right upper lobe infiltrate may be pneumonia.

## 2017-01-18 ENCOUNTER — APPOINTMENT (OUTPATIENT)
Dept: PHYSICAL THERAPY | Facility: CLINIC | Age: 82
DRG: 871 | End: 2017-01-18
Attending: INTERNAL MEDICINE
Payer: MEDICARE

## 2017-01-18 ENCOUNTER — APPOINTMENT (OUTPATIENT)
Dept: SPEECH THERAPY | Facility: CLINIC | Age: 82
DRG: 871 | End: 2017-01-18
Attending: INTERNAL MEDICINE
Payer: MEDICARE

## 2017-01-18 LAB
ANION GAP SERPL CALCULATED.3IONS-SCNC: 8 MMOL/L (ref 3–14)
BACTERIA SPEC CULT: ABNORMAL
BUN SERPL-MCNC: 17 MG/DL (ref 7–30)
CALCIUM SERPL-MCNC: 8.6 MG/DL (ref 8.5–10.1)
CHLORIDE SERPL-SCNC: 109 MMOL/L (ref 94–109)
CO2 SERPL-SCNC: 26 MMOL/L (ref 20–32)
CREAT SERPL-MCNC: 1.2 MG/DL (ref 0.66–1.25)
ERYTHROCYTE [DISTWIDTH] IN BLOOD BY AUTOMATED COUNT: 18 % (ref 10–15)
GFR SERPL CREATININE-BSD FRML MDRD: 58 ML/MIN/1.7M2
GLUCOSE SERPL-MCNC: 91 MG/DL (ref 70–99)
HCT VFR BLD AUTO: 27.3 % (ref 40–53)
HGB BLD-MCNC: 8.6 G/DL (ref 13.3–17.7)
Lab: ABNORMAL
MCH RBC QN AUTO: 31.6 PG (ref 26.5–33)
MCHC RBC AUTO-ENTMCNC: 31.5 G/DL (ref 31.5–36.5)
MCV RBC AUTO: 100 FL (ref 78–100)
MICRO REPORT STATUS: ABNORMAL
MICROORGANISM SPEC CULT: ABNORMAL
PLATELET # BLD AUTO: 176 10E9/L (ref 150–450)
POTASSIUM SERPL-SCNC: 4.1 MMOL/L (ref 3.4–5.3)
RBC # BLD AUTO: 2.72 10E12/L (ref 4.4–5.9)
SODIUM SERPL-SCNC: 143 MMOL/L (ref 133–144)
SPECIMEN SOURCE: ABNORMAL
WBC # BLD AUTO: 6.4 10E9/L (ref 4–11)

## 2017-01-18 PROCEDURE — 36415 COLL VENOUS BLD VENIPUNCTURE: CPT | Performed by: INTERNAL MEDICINE

## 2017-01-18 PROCEDURE — 97530 THERAPEUTIC ACTIVITIES: CPT | Mod: GP | Performed by: PHYSICAL THERAPIST

## 2017-01-18 PROCEDURE — 40000193 ZZH STATISTIC PT WARD VISIT: Performed by: PHYSICAL THERAPIST

## 2017-01-18 PROCEDURE — 82565 ASSAY OF CREATININE: CPT | Performed by: INTERNAL MEDICINE

## 2017-01-18 PROCEDURE — 85027 COMPLETE CBC AUTOMATED: CPT | Performed by: INTERNAL MEDICINE

## 2017-01-18 PROCEDURE — 97161 PT EVAL LOW COMPLEX 20 MIN: CPT | Mod: GP | Performed by: PHYSICAL THERAPIST

## 2017-01-18 PROCEDURE — 99233 SBSQ HOSP IP/OBS HIGH 50: CPT | Performed by: INTERNAL MEDICINE

## 2017-01-18 PROCEDURE — 40000225 ZZH STATISTIC SLP WARD VISIT

## 2017-01-18 PROCEDURE — 25000128 H RX IP 250 OP 636: Performed by: INTERNAL MEDICINE

## 2017-01-18 PROCEDURE — 40000275 ZZH STATISTIC RCP TIME EA 10 MIN

## 2017-01-18 PROCEDURE — 25000132 ZZH RX MED GY IP 250 OP 250 PS 637: Mod: GY | Performed by: INTERNAL MEDICINE

## 2017-01-18 PROCEDURE — 25000308 HC RX OP HPI UCR WEL MED 250 IP 250: Performed by: INTERNAL MEDICINE

## 2017-01-18 PROCEDURE — 92526 ORAL FUNCTION THERAPY: CPT | Mod: GN

## 2017-01-18 PROCEDURE — 25000125 ZZHC RX 250: Performed by: INTERNAL MEDICINE

## 2017-01-18 PROCEDURE — A9270 NON-COVERED ITEM OR SERVICE: HCPCS | Mod: GY | Performed by: INTERNAL MEDICINE

## 2017-01-18 PROCEDURE — 40000894 ZZH STATISTIC OT IP EVAL DEFER

## 2017-01-18 PROCEDURE — 12000000 ZZH R&B MED SURG/OB

## 2017-01-18 PROCEDURE — 80048 BASIC METABOLIC PNL TOTAL CA: CPT | Performed by: INTERNAL MEDICINE

## 2017-01-18 PROCEDURE — 94640 AIRWAY INHALATION TREATMENT: CPT

## 2017-01-18 PROCEDURE — 92610 EVALUATE SWALLOWING FUNCTION: CPT | Mod: GN

## 2017-01-18 PROCEDURE — 94640 AIRWAY INHALATION TREATMENT: CPT | Mod: 76

## 2017-01-18 RX ORDER — HALOPERIDOL 5 MG/ML
1 INJECTION INTRAMUSCULAR EVERY 6 HOURS PRN
Status: DISCONTINUED | OUTPATIENT
Start: 2017-01-17 | End: 2017-01-18

## 2017-01-18 RX ORDER — IPRATROPIUM BROMIDE AND ALBUTEROL SULFATE 2.5; .5 MG/3ML; MG/3ML
3 SOLUTION RESPIRATORY (INHALATION) EVERY 4 HOURS PRN
Status: DISCONTINUED | OUTPATIENT
Start: 2017-01-18 | End: 2017-01-21 | Stop reason: HOSPADM

## 2017-01-18 RX ADMIN — TAZOBACTAM SODIUM AND PIPERACILLIN SODIUM 4.5 G: 500; 4 INJECTION, SOLUTION INTRAVENOUS at 13:07

## 2017-01-18 RX ADMIN — SALINE NASAL SPRAY 2 SPRAY: 1.5 SOLUTION NASAL at 09:41

## 2017-01-18 RX ADMIN — TAMSULOSIN HYDROCHLORIDE 0.4 MG: 0.4 CAPSULE ORAL at 20:59

## 2017-01-18 RX ADMIN — TAZOBACTAM SODIUM AND PIPERACILLIN SODIUM 4.5 G: 500; 4 INJECTION, SOLUTION INTRAVENOUS at 01:17

## 2017-01-18 RX ADMIN — VANCOMYCIN HYDROCHLORIDE 1750 MG: 10 INJECTION, POWDER, LYOPHILIZED, FOR SOLUTION INTRAVENOUS at 02:25

## 2017-01-18 RX ADMIN — ALBUTEROL SULFATE 2.5 MG: 2.5 SOLUTION RESPIRATORY (INHALATION) at 08:49

## 2017-01-18 RX ADMIN — CYANOCOBALAMIN TAB 1000 MCG 1000 MCG: 1000 TAB at 09:33

## 2017-01-18 RX ADMIN — IPRATROPIUM BROMIDE AND ALBUTEROL SULFATE 3 ML: .5; 3 SOLUTION RESPIRATORY (INHALATION) at 17:06

## 2017-01-18 RX ADMIN — MEMANTINE 10 MG: 5 TABLET ORAL at 09:34

## 2017-01-18 RX ADMIN — CHOLECALCIFEROL CAP 125 MCG (5000 UNIT) 5000 UNITS: 125 CAP at 09:33

## 2017-01-18 RX ADMIN — QUETIAPINE FUMARATE 25 MG: 25 TABLET, FILM COATED ORAL at 09:34

## 2017-01-18 RX ADMIN — ACETAMINOPHEN 1000 MG: 500 TABLET, FILM COATED ORAL at 20:59

## 2017-01-18 RX ADMIN — QUETIAPINE FUMARATE 25 MG: 25 TABLET, FILM COATED ORAL at 20:58

## 2017-01-18 RX ADMIN — ENOXAPARIN SODIUM 40 MG: 40 INJECTION SUBCUTANEOUS at 13:07

## 2017-01-18 RX ADMIN — DULOXETINE HYDROCHLORIDE 30 MG: 30 CAPSULE, DELAYED RELEASE ORAL at 09:33

## 2017-01-18 RX ADMIN — ACETAMINOPHEN 1000 MG: 500 TABLET, FILM COATED ORAL at 09:33

## 2017-01-18 RX ADMIN — TAZOBACTAM SODIUM AND PIPERACILLIN SODIUM 4.5 G: 500; 4 INJECTION, SOLUTION INTRAVENOUS at 06:58

## 2017-01-18 NOTE — PROGRESS NOTES
[facility]  Hospitalist Progress Note  Nahid Arreola MD 01/18/2017    Reason for Stay (Diagnosis): Sepsis, pneumonia         Assessment and Plan:      Summary of Stay: Eder Pearson is a 83 year old male w/ hx of dementia, anxiety, CKD, and prostate cancer admitted from his memory care unit on 1/16/2017 with cough and fevers.  Found to have fever and leukocytosis.  Xray concerning for pneumonia.  Also with pyuria.  Initiated on broad spectrum abx with vancomycin, zosyn, and levofloxacin.  Quite somnolent on admission likely from sepsis.  Fevers and leukocytosis resolved.  Fluctuating alert and somnolent.  Urine culture positive for Proteus.  Discontinuing vancomycin.  SLP consulted for concern for aspiration, but does ok with all textures as long as he is alert.  Still suspicious for possible pneumonia given productive sounding cough.      Problem List/Assessment and Plan:   Sepsis due to HCAP from possible GNRs and possible UTI: fevers, leukoctyosis, and cough.  Clinically with sepsis.  RUL infiltrated on xray and productive sounding cough.  Apparently multiple exposures to RSV positive kids and family recently.  Some concern for aspiration given decreased LOC.  Treated as HCAP as he lives in a memory care unit.  UA with pyuria now growing proteus, which will be covered by these abx.  Fevers and leukocytosis resolved.  -discontinue vancomycin as no data positive for MRSA. Monitor fever curve  -continue zosyn/levofloxacin today, may adjust tomorrow  -follow proteus urine culture for sensitivities    Dementia with behavioral issues: lives in memory care unit.  Has some behavioral issues at baseline.  Alternating with somnolence and alertness here.  Has had some agitation.   -Continue pta medications including cymbalta, namenda, and seroquel  -per daughter he does very well with seroquel prn for agitation and would prefer using this to haldol if able.  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  No fevers overnight.  Urine positive for Proteus.  More alert this morning.  Has a productive sounding cough.  Denies pain.  Discussed plan at length with family later in day.  More somnolent in afternoon.                  Physical Exam:      Last Vital Signs:  /69 mmHg  Temp(Src) 97.2  F (36.2  C) (Oral)  Resp 22  Ht 1.727 m (5' 8\")  Wt 86.864 kg (191 lb 8 oz)  BMI 29.12 kg/m2  SpO2 98%    Constitutional: alert, NAD  Eyes: sclera white   HEENT:   MMM  Respiratory:  lungs cta bilaterally, no crackles or wheeze, but productive sounding cough  Cardiovascular: RRR.  No murmur   GI: non-tender, not distended, bowel sounds present  Skin: warm, no rash    Musculoskeletal/extremities:  No edema  Neurologic: alert, confused  Psychiatric: calm         Medications:      All current medications were reviewed with changes reflected in problem list.         Data:      All new lab and imaging data was reviewed.   Labs:    Recent Labs  Lab 01/16/17  2325 01/16/17  2252 01/16/17  2158   CULT No growth after 1 day >100,000 colonies/mL Proteus mirabilisSusceptibility testing in progress* No growth after 1 day       Recent Labs  Lab 01/18/17  0809 01/17/17  0643 01/16/17  2158   WBC 6.4 12.5* 14.1*   HGB 8.6* 9.1* 9.3*   HCT 27.3* 28.1* 28.8*    100 98    184 226       Recent Labs  Lab 01/18/17  0809 01/17/17  0643 01/16/17  2158    141 140   POTASSIUM 4.1 3.7 4.0   CHLORIDE 109 108 106   CO2 26 24 24   ANIONGAP 8 9 10   GLC 91 109* " 116*   BUN 17 28 32*   CR 1.20 1.16 1.28*   GFRESTIMATED 58* 60* 54*   GFRESTBLACK 70 73 65   ARANZA 8.6 8.0* 8.2*   PROTTOTAL  --   --  6.7*   ALBUMIN  --   --  2.9*   BILITOTAL  --   --  1.1   ALKPHOS  --   --  47   AST  --   --  23   ALT  --   --  14       Imaging:  None today     Nahid Arreola MD

## 2017-01-18 NOTE — PLAN OF CARE
Problem: Goal Outcome Summary  Goal: Goal Outcome Summary  OT:  Per chart review and discussion with interdisciplinary team, pt receives assist with all ADLs at baseline.  Pt with no OT needs during stay.  Will complete OT order.

## 2017-01-18 NOTE — PLAN OF CARE
Alert and oriented to self only, confused. VSS, 94% O2 sats on room air. Lung sounds coarse/dim. Loose/congested cough. PRN neb given. Up in recliner using lift. Regular diet needed assistance with feeding. Incont of urine x2 and bm x1.

## 2017-01-18 NOTE — PLAN OF CARE
Problem: Goal Outcome Summary  Goal: Goal Outcome Summary  SLP: Bedside alli quesada completed per MD orders. Pt presents with minimal oropharyngeal dysphagia in the setting of waxing/waning level of alertness. Pt tolerated all PO textures with no overt s/sx of aspiration; however pt required consistent verbal cues to remain alert throughout ST session. Recommend continue regular textures and thin liquids with supervision. Pt should be fully upright for all PO, take small single sips/bites, alternate consistencies, and pace self. Hold PO should pt demonstrate overt s/sx of aspiration. ST to continue to follow for 1-2 sessions to assess diet tolerance

## 2017-01-18 NOTE — PLAN OF CARE
Problem: Goal Outcome Summary  Goal: Goal Outcome Summary  Outcome: No Change  VSS, alert, oriented to self only.  LS-coarse, BELEN, RUL exp wheezes.  Meds crushed with pudding or applesauce.  Zosyn q6 and levaquin q48h.  Discharge 1-2 more days.        1700: text page to RT- Can you please come assess pt when you have a moment, pt is coarse and some exp wheezes auscultated.  Please advise.

## 2017-01-18 NOTE — PROGRESS NOTES
01/18/17 1100   Quick Adds   Type of Visit Initial PT Evaluation   Living Environment   Lives With facility resident   Living Arrangements assisted living  ([facility], [place] location)   Home Accessibility no concerns   Self-Care   Usual Activity Tolerance moderate  (sleeps alot per nurse at [facility])   Current Activity Tolerance poor   Equipment Currently Used at Home wheelchair;walker, rolling;bath bench   Activity/Exercise/Self-Care Comment Spoke to nurse at [facility], pt is total assist for dressing, toileting, bathing, med's. A x2-3 to get OOB, pt can stand on his own and does walk on his own w/ and w/o walker (forgets to take it). Pt isnt supposed to walk alone, but getsd up and goes at will. frequent falls.    Functional Level Prior   Ambulation 1-->assistive equipment  (or w/o prn, supposed to have SBA, pt pt gets up unexpectedly)   Transferring 1-->assistive equipment   Toileting 3-->assistive equipment and person   Bathing 3-->assistive equipment and person   Dressing 2-->assistive person   Cognition 1 - attention or memory deficits   Fall history within last six months yes   Number of times patient has fallen within last six months (frequent falls per nurse)   Prior Functional Level Comment Dementia, frequent falls, walks at will, sleeps alot.   General Information   Onset of Illness/Injury or Date of Surgery - Date 01/16/17   Referring Physician Nahid Arreola MD   Patient/Family Goals Statement none stated, pt confused/dememtia   Pertinent History of Current Problem (include personal factors and/or comorbidities that impact the POC) Pt admitted with UTI and HAP w/ sepsis. hx: dementia , lives in U, has behaviors, can be combative. family considering hospice, per nurse at [place]   Precautions/Limitations fall precautions   General Observations slouching down in recliner in room   Cognitive Status Examination   Orientation not oriented to person, place or time   Level of Consciousness  confused;lethargic/somnolent   Follows Commands and Answers Questions unable to follow commands   Memory impaired   Cognitive Comment Dememtia   Posture    Posture Comments slouching in chair   Range of Motion (ROM)   ROM Comment BLE WFL for age,    Strength   Strength Comments not able to test due to dementia   Bed Mobility   Bed Mobility Comments Per nurse, lift system. At baseline, per [place] nurse A x 2-3 people   Transfer Skills   Transfer Comments Lift today, at baseline pt gets up unassisted when desires, A x 1-2 if he isnt cooperating.  Today, pt ucooperative. Max A to get to EOC, unable to get pt to stand, pt uncooperative.   Gait   Gait Comments Unable at eval, pt uncooperative. At baseline, per nurse, pt walks about 100' w or w/o fww, at times unassisted as he wanders. high falls risk   General Therapy Interventions   Planned Therapy Interventions progressive activity/exercise;transfer training;gait training   Intervention Comments try to assess level of A needed  for tranfers and gait   Clinical Impression   Criteria for Skilled Therapeutic Intervention yes, treatment indicated   PT Diagnosis below baseline functionin in mobility   Influenced by the following impairments dememtia, pneumonia, lethargy, behaviors   Functional limitations due to impairments unable to assess tranfers and gait at eval due to lack of cooeration, lmechanical lift needed for tranfsers   Clinical Presentation Stable/Uncomplicated   Clinical Presentation Rationale pt on treatment for UTI and pna   Clinical Decision Making (Complexity) Low complexity   Therapy Frequency` daily   Predicted Duration of Therapy Intervention (days/wks) 3x week   Anticipated Discharge Disposition Home  (back to [facility])   Risk & Benefits of therapy have been explained Yes   Patient, Family & other staff in agreement with plan of care Yes   Clinical Impression Comments PT: limited eval due to lack of cooperation, this is past of his baseline  "donaer nurse at [facility] AM-PAC TM \"6 Clicks\"   2016, Trustees of [facility], under license to [employer].  All rights reserved.   6 Clicks Short Forms Basic Mobility Inpatient Short Form   [facility] AM-PAC  \"6 Clicks\" V.2 Basic Mobility Inpatient Short Form   1. Turning from your back to your side while in a flat bed without using bedrails? 1 - Total   2. Moving from lying on your back to sitting on the side of a flat bed without using bedrails? 1 - Total   3. Moving to and from a bed to a chair (including a wheelchair)? 1 - Total   4. Standing up from a chair using your arms (e.g., wheelchair, or bedside chair)? 1 - Total   5. To walk in hospital room? 1 - Total   6. Climbing 3-5 steps with a railing? 1 - Total   Basic Mobility Raw Score (Score out of 24.Lower scores equate to lower levels of function) 6   Total Evaluation Time   Total Evaluation Time (Minutes) 13     "

## 2017-01-18 NOTE — PLAN OF CARE
Problem: Goal Outcome Summary  Goal: Goal Outcome Summary  PT: Limited PT evaluation completed, tx initiated. [facility]. Has dementia. Spoke to nurse at [place], she reports pt can be combative and uncooperative.She reports baseline is A x 2-3 to get OOB, pt can stand on his own and does walk on his or with fww (supposed to have SBA, but pt wanders and gets up unexpectedly), approx 100' at baseline.  Today, pt uncooperative, lift system to get into chair, Max A to get to EOC to attempt to stand, pt uncooperative, and resisted all efforts to mobilize, confused, not oriented/dementia, seated alarm on. Plan: Try transfers again, try to walk with fww or HHA, A x2 needed.  [facility] [place].

## 2017-01-18 NOTE — PROGRESS NOTES
01/18/17 1105   General Information   Onset Date 01/16/17   Start of Care Date 01/18/17   Referring Physician Nahid Arreola MD   Patient Profile Review/OT: Additional Occupational Profile Info See Profile for full history and prior level of function   Patient/Family Goals Statement Pt did not state   Swallowing Evaluation Bedside swallow evaluation   Behaviorial Observations Lethargic   Mode of current nutrition Oral diet   Type of oral diet Regular;Thin liquid   Respiratory Status Room air   Comments Eder Pearson is a 83 year old male w/ hx of dementia, anxiety, CKD, and prostate cancer admitted from his memory care unit on 1/16/2017 with cough and fevers.  Found to have fever and leukocytosis.  Xray concerning for pneumonia.  Also with pyuria.  Initiated on broad spectrum abx with vancomycin, zosyn, and levofloxacin.  Quite somnolent form admission likely from sepsis.  Fever curve improving and vitally stable.    Clinical Swallow Evaluation   Oral Musculature generally intact   Structural Abnormalities none present   Dentition present and adequate   Mucosal Quality adequate   Mandibular Strength and Mobility intact   Oral Labial Strength and Mobility WFL   Lingual Strength and Mobility WFL   Velar Elevation intact   Buccal Strength and Mobility intact   Laryngeal Function Cough;Throat clear;Swallow;Voicing initiated   Oral Musculature Comments Pt with baseline cough   Additional Documentation Yes   Clinical Swallow Eval: Thin Liquid Texture Trial   Mode of Presentation, Thin Liquids self-fed;straw;fed by clinician   Volume of Liquid or Food Presented 4 oz   Oral Phase of Swallow WFL   Pharyngeal Phase of Swallow intact   Diagnostic Statement Pt tolerated thin liquids via straw with no overt s/sx of aspiratio    Clinical Swallow Eval: Puree Solid Texture Trial   Mode of Presentation, Puree spoon;fed by clinician   Volume of Puree Presented 3 tbsp   Oral Phase, Puree WFL   Pharyngeal Phase, Puree  intact   Diagnostic Statement Pt tolerated pureed textures with no overt s/sx of aspiration    Clinical Swallow Eval: Solid Food Texture Trial   Mode of Presentation, Solid fed by clinician   Volume of Solid Food Presented 1/2 isaiah cracker   Oral Phase, Solid other (see comments)  (mildly prolonged time for mastication)   Pharyngeal Phase, Solid intact   Diagnostic Statement Pt required mildly prolonged but functional time for mastication on regular solid textures and no overt s/sx of aspiration    VFSS Evaluation   VFSS Additional Documentation No   FEES Evaluation   Additional Documentation No   Swallow Compensations   Swallow Compensations Alternate viscosity of consistencies;Pacing;Reduce amounts;Multiple swallow   Results No difficulties noted   Esophageal Phase of Swallow   Patient reports or presents with symptoms of esophageal dysphagia No   General Therapy Interventions   Planned Therapy Interventions Dysphagia Treatment   Dysphagia treatment Instruction of safe swallow strategies;Compensatory strategies for swallowing   Swallow Eval: Clinical Impressions   Skilled Criteria for Therapy Intervention Skilled criteria met.  Treatment indicated.   Functional Assessment Scale (FAS) 6   Treatment Diagnosis Minimal oropharyngeal dysphagia   Diet texture recommendations Regular diet;Thin liquids   Recommended Feeding/Eating Techniques alternate between small bites and sips of food/liquid;check mouth frequently for oral residue/pocketing;hard swallow w/ each bite or sip;maintain upright posture during/after eating for 30 mins;small sips/bites   Demonstrates Need for Referral to Another Service physical therapy;occupational therapy   Therapy Frequency 3 times/wk   Predicted Duration of Therapy Intervention (days/wks) 1-2 follow up sessions   Anticipated Discharge Disposition extended care facility   Risks and Benefits of Treatment have been explained. Yes   Patient, family and/or staff in agreement with Plan of  Care Yes   Clinical Impression Comments SLP: Bedside alli quesada completed per MD orders. Pt presents with minimal oropharyngeal dysphagia in the setting of waxing/waning level of alertness. Pt tolerated all PO textures with no overt s/sx of aspiration; however pt required consistent verbal cues to remain alert throughout ST session. Recommend continue regular textures and thin liquids with supervision. Pt should be fully upright for all PO, take small single sips/bites, alternate consistencies, and pace self. Hold PO should pt demonstrate overt s/sx of aspiration. ST to continue to follow for 1-2 sessions to assess diet tolerance    Total Evaluation Time   Total Evaluation Time (Minutes) 8

## 2017-01-18 NOTE — PROGRESS NOTES
X-Cover    Notified that patient has increased agitation and trying to hit staff. Refused his Seroquel earlier. Will try IV haldol 1 mg

## 2017-01-18 NOTE — PLAN OF CARE
Problem: Goal Outcome Summary  Goal: Goal Outcome Summary  Outcome: No Change  Confused. VSS. Lungs coarse, diminished. Loose, congested cough.  IV Vanco, Levaquin, Zosyn given as ordered. Q2 hour turns. A1-2 for cares. Incont. of bowel and bladder. Slept well overnight.

## 2017-01-19 ENCOUNTER — APPOINTMENT (OUTPATIENT)
Dept: PHYSICAL THERAPY | Facility: CLINIC | Age: 82
DRG: 871 | End: 2017-01-19
Payer: MEDICARE

## 2017-01-19 ENCOUNTER — APPOINTMENT (OUTPATIENT)
Dept: SPEECH THERAPY | Facility: CLINIC | Age: 82
DRG: 871 | End: 2017-01-19
Payer: MEDICARE

## 2017-01-19 LAB
ANION GAP SERPL CALCULATED.3IONS-SCNC: 7 MMOL/L (ref 3–14)
BUN SERPL-MCNC: 16 MG/DL (ref 7–30)
CALCIUM SERPL-MCNC: 8.8 MG/DL (ref 8.5–10.1)
CHLORIDE SERPL-SCNC: 110 MMOL/L (ref 94–109)
CO2 SERPL-SCNC: 27 MMOL/L (ref 20–32)
CREAT SERPL-MCNC: 1.15 MG/DL (ref 0.66–1.25)
ERYTHROCYTE [DISTWIDTH] IN BLOOD BY AUTOMATED COUNT: 17.7 % (ref 10–15)
GFR SERPL CREATININE-BSD FRML MDRD: 61 ML/MIN/1.7M2
GLUCOSE SERPL-MCNC: 91 MG/DL (ref 70–99)
HCT VFR BLD AUTO: 27.1 % (ref 40–53)
HGB BLD-MCNC: 8.5 G/DL (ref 13.3–17.7)
MCH RBC QN AUTO: 31.1 PG (ref 26.5–33)
MCHC RBC AUTO-ENTMCNC: 31.4 G/DL (ref 31.5–36.5)
MCV RBC AUTO: 99 FL (ref 78–100)
PLATELET # BLD AUTO: 227 10E9/L (ref 150–450)
POTASSIUM SERPL-SCNC: 3.5 MMOL/L (ref 3.4–5.3)
RBC # BLD AUTO: 2.73 10E12/L (ref 4.4–5.9)
SODIUM SERPL-SCNC: 144 MMOL/L (ref 133–144)
WBC # BLD AUTO: 5.4 10E9/L (ref 4–11)

## 2017-01-19 PROCEDURE — 40000225 ZZH STATISTIC SLP WARD VISIT

## 2017-01-19 PROCEDURE — A9270 NON-COVERED ITEM OR SERVICE: HCPCS | Mod: GY | Performed by: INTERNAL MEDICINE

## 2017-01-19 PROCEDURE — 12000000 ZZH R&B MED SURG/OB

## 2017-01-19 PROCEDURE — 25000125 ZZHC RX 250: Performed by: INTERNAL MEDICINE

## 2017-01-19 PROCEDURE — 90732 PPSV23 VACC 2 YRS+ SUBQ/IM: CPT | Performed by: INTERNAL MEDICINE

## 2017-01-19 PROCEDURE — 25000128 H RX IP 250 OP 636: Performed by: INTERNAL MEDICINE

## 2017-01-19 PROCEDURE — 25000132 ZZH RX MED GY IP 250 OP 250 PS 637: Mod: GY | Performed by: INTERNAL MEDICINE

## 2017-01-19 PROCEDURE — 92526 ORAL FUNCTION THERAPY: CPT | Mod: GN

## 2017-01-19 PROCEDURE — 97530 THERAPEUTIC ACTIVITIES: CPT | Mod: GP | Performed by: PHYSICAL THERAPIST

## 2017-01-19 PROCEDURE — 40000275 ZZH STATISTIC RCP TIME EA 10 MIN

## 2017-01-19 PROCEDURE — 99232 SBSQ HOSP IP/OBS MODERATE 35: CPT | Performed by: INTERNAL MEDICINE

## 2017-01-19 PROCEDURE — 36415 COLL VENOUS BLD VENIPUNCTURE: CPT | Performed by: INTERNAL MEDICINE

## 2017-01-19 PROCEDURE — 80048 BASIC METABOLIC PNL TOTAL CA: CPT | Performed by: INTERNAL MEDICINE

## 2017-01-19 PROCEDURE — 40000193 ZZH STATISTIC PT WARD VISIT: Performed by: PHYSICAL THERAPIST

## 2017-01-19 PROCEDURE — 85027 COMPLETE CBC AUTOMATED: CPT | Performed by: INTERNAL MEDICINE

## 2017-01-19 RX ORDER — LEVOFLOXACIN 5 MG/ML
750 INJECTION, SOLUTION INTRAVENOUS EVERY 24 HOURS
Status: DISCONTINUED | OUTPATIENT
Start: 2017-01-19 | End: 2017-01-21 | Stop reason: HOSPADM

## 2017-01-19 RX ADMIN — SALINE NASAL SPRAY 2 SPRAY: 1.5 SOLUTION NASAL at 21:57

## 2017-01-19 RX ADMIN — DULOXETINE HYDROCHLORIDE 30 MG: 30 CAPSULE, DELAYED RELEASE ORAL at 08:36

## 2017-01-19 RX ADMIN — TAMSULOSIN HYDROCHLORIDE 0.4 MG: 0.4 CAPSULE ORAL at 21:55

## 2017-01-19 RX ADMIN — TAZOBACTAM SODIUM AND PIPERACILLIN SODIUM 4.5 G: 500; 4 INJECTION, SOLUTION INTRAVENOUS at 00:00

## 2017-01-19 RX ADMIN — IPRATROPIUM BROMIDE AND ALBUTEROL SULFATE 3 ML: .5; 3 SOLUTION RESPIRATORY (INHALATION) at 16:47

## 2017-01-19 RX ADMIN — Medication 12.5 MG: at 18:02

## 2017-01-19 RX ADMIN — QUETIAPINE FUMARATE 25 MG: 25 TABLET, FILM COATED ORAL at 08:37

## 2017-01-19 RX ADMIN — ACETAMINOPHEN 1000 MG: 500 TABLET, FILM COATED ORAL at 21:56

## 2017-01-19 RX ADMIN — PNEUMOCOCCAL VACCINE POLYVALENT 0.5 ML
25; 25; 25; 25; 25; 25; 25; 25; 25; 25; 25; 25; 25; 25; 25; 25; 25; 25; 25; 25; 25; 25; 25 INJECTION, SOLUTION INTRAMUSCULAR; SUBCUTANEOUS at 13:02

## 2017-01-19 RX ADMIN — MEMANTINE 10 MG: 5 TABLET ORAL at 08:36

## 2017-01-19 RX ADMIN — TAZOBACTAM SODIUM AND PIPERACILLIN SODIUM 4.5 G: 500; 4 INJECTION, SOLUTION INTRAVENOUS at 05:48

## 2017-01-19 RX ADMIN — ACETAMINOPHEN 1000 MG: 500 TABLET, FILM COATED ORAL at 08:37

## 2017-01-19 RX ADMIN — LEVOFLOXACIN 750 MG: 5 INJECTION, SOLUTION INTRAVENOUS at 08:36

## 2017-01-19 RX ADMIN — CHOLECALCIFEROL CAP 125 MCG (5000 UNIT) 5000 UNITS: 125 CAP at 08:37

## 2017-01-19 RX ADMIN — CYANOCOBALAMIN TAB 1000 MCG 1000 MCG: 1000 TAB at 08:36

## 2017-01-19 RX ADMIN — SALINE NASAL SPRAY 2 SPRAY: 1.5 SOLUTION NASAL at 08:37

## 2017-01-19 RX ADMIN — QUETIAPINE FUMARATE 25 MG: 25 TABLET, FILM COATED ORAL at 21:55

## 2017-01-19 RX ADMIN — ENOXAPARIN SODIUM 40 MG: 40 INJECTION SUBCUTANEOUS at 13:03

## 2017-01-19 ASSESSMENT — ACTIVITIES OF DAILY LIVING (ADL)
SWALLOWING: 0-->SWALLOWS FOODS/LIQUIDS WITHOUT DIFFICULTY
RETIRED_COMMUNICATION: 2-->DIFFICULTY UNDERSTANDING (NOT RELATED TO LANGUAGE BARRIER)
RETIRED_EATING: 2-->ASSISTIVE PERSON

## 2017-01-19 NOTE — PLAN OF CARE
Problem: Goal Outcome Summary  Goal: Goal Outcome Summary  PT: Poor tolerance to transfer, distractable and weak. Needing max A x 2 for transfer to recliner, recommend Meenu steady for return to bed. Alarm on pt. PT recommends return to memory care with increased supervision/assist with all transfers.

## 2017-01-19 NOTE — PROGRESS NOTES
[facility]  Hospitalist Progress Note  Nahid Arreola MD 01/19/2017    Reason for Stay (Diagnosis): Sepsis, pneumonia         Assessment and Plan:      Summary of Stay: Eder Pearson is a 83 year old male w/ hx of dementia, anxiety, CKD, and prostate cancer admitted from his memory care unit on 1/16/2017 with cough and fevers.  Found to have fever and leukocytosis.  Xray concerning for pneumonia.  Also with pyuria.  Initiated on broad spectrum abx with vancomycin, zosyn, and levofloxacin.  Quite somnolent on admission likely from sepsis.  Fevers and leukocytosis resolved.  Urine culture positive for Proteus.  Discontinued vancomycin 1/18.  SLP consulted for concern for aspiration, but does ok with all textures as long as he is alert.  Still suspicious for possible pneumonia given productive sounding cough.  Discontinued zosyn 1/19.  Monitoring overnight for fevers, but possible d/c tomorrow to memory care unit.      Problem List/Assessment and Plan:   Sepsis due to HCAP from possible GNRs and possible UTI: fevers, leukoctyosis, and cough.  Clinically with sepsis.  RUL infiltrated on xray and productive sounding cough.  Apparently multiple exposures to RSV positive kids and family recently.  Some concern for aspiration given decreased LOC.  Treated as HCAP as he lives in a memory care unit.  UA with pyuria now growing proteus.  Fevers and leukocytosis resolved.  Discontinued vancomycin as no data positive for MRSA. Monitor fever curve  -continue levofloxacin.  Can transition to oral tomorrow if no fevers.  Would provide 7 total days abx    Dementia with behavioral issues: lives in memory care unit.  Has some behavioral issues at baseline.  Alternating with somnolence and alertness here.  Has had some agitation.  -Continue pta medications including cymbalta, namenda, and seroquel  -would make note to give seroquel after meals instead of before so he is not too sedated for eating and decrease the  "risk of aspiration  -per daughter he does very well with seroquel prn for agitation and would prefer using this to haldol if able.  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        Interval History (Subjective):      No acute issues overnight.  Somnolent in morning, but woke up to eat meals.  More alert in afternoon and feels good.  Is confused, but not agitated.  Discussed care plan with daughter.                  Physical Exam:      Last Vital Signs:  /81 mmHg  Temp(Src) 98.6  F (37  C) (Oral)  Resp 18  Ht 1.727 m (5' 8\")  Wt 86.864 kg (191 lb 8 oz)  BMI 29.12 kg/m2  SpO2 94%    Constitutional: alert, NAD  Eyes: sclera white   HEENT:   MMM  Respiratory:  Odd upper airway wheeze, that is not stridor  Cardiovascular: RRR.  No murmur   GI: non-tender, not distended, bowel sounds present  Skin: warm, no rash    Musculoskeletal/extremities:  No edema  Neurologic: alert, confused, moves all extremities  Psychiatric: calm         Medications:      All current medications were reviewed with changes reflected in problem list.         Data:      All new lab and imaging data was reviewed.   Labs:    Recent Labs  Lab 01/16/17  2325 01/16/17  2252 01/16/17  2158   CULT No growth after 2 days >100,000 colonies/mL Proteus mirabilis* No growth after 2 days       Recent Labs  Lab 01/19/17  0534 01/18/17  0809 01/17/17  0643   WBC 5.4 6.4 12.5*   HGB 8.5* 8.6* 9.1*   HCT 27.1* 27.3* 28.1*   MCV 99 100 100    176 184       Recent Labs  Lab 01/19/17  0534 01/18/17  0809 01/17/17  0643 01/16/17  2158    143 141 140   POTASSIUM 3.5 4.1 3.7 4.0   CHLORIDE 110* 109 108 106 "   CO2 27 26 24 24   ANIONGAP 7 8 9 10   GLC 91 91 109* 116*   BUN 16 17 28 32*   CR 1.15 1.20 1.16 1.28*   GFRESTIMATED 61 58* 60* 54*   GFRESTBLACK 73 70 73 65   ARANZA 8.8 8.6 8.0* 8.2*   PROTTOTAL  --   --   --  6.7*   ALBUMIN  --   --   --  2.9*   BILITOTAL  --   --   --  1.1   ALKPHOS  --   --   --  47   AST  --   --   --  23   ALT  --   --   --  14       Imaging:  None today     Nahid Arreola MD

## 2017-01-19 NOTE — PLAN OF CARE
Problem: Goal Outcome Summary  Goal: Goal Outcome Summary  Outcome: Therapy, progress toward functional goals as expected  SLP: Pt seen for dysphagia tx with breakfast tray present and 1:1 supervision with LPN. Per nursing staff pt is tolerating current diet well and still with baseline cough. Pt tolerated thin liquids via cup and regular textures with no overt s/sx of aspiration. Recommend continue regular textures and thin liquids with 1:1 supervision. Pt should be fully alert and upright for all PO, take small sips/bites, alternate consistencies, and pace self. Pt will require ongoing supervision/assistance to enforce safe swallow strategies given pts cognitive status. Goals met. ST to sign off.     Speech Language Therapy Discharge Summary    Reason for therapy discharge:    All goals and outcomes met, no further needs identified.    Progress towards therapy goal(s). See goals on Care Plan in [facility] electronic health record for goal details.  Goals met    Therapy recommendation(s):    No further therapy is recommended.

## 2017-01-19 NOTE — PLAN OF CARE
Problem: Goal Outcome Summary  Goal: Goal Outcome Summary  Outcome: No Change    Pt is alert to self only. Up with lift. Incont. Pt cooperative with cares this shift. On levaquin for pna. BP elevated 174/86 and 169/89. Possible d/c tomorrow. Will continue to monitor.

## 2017-01-19 NOTE — PLAN OF CARE
Problem: Goal Outcome Summary  Goal: Goal Outcome Summary  Outcome: No Change  VSS on RA. Pt rested in bed this shift, turns/repositions self. Incontinent of bladder, A2 to turn/change. Combative with cares, otherwise rested in bed. IV access re-established this shift. Zosyn given as ordered. Oriented to self only. Bed alarm in use, continue to monitor.

## 2017-01-20 LAB
CREAT SERPL-MCNC: 1 MG/DL (ref 0.66–1.25)
ERYTHROCYTE [DISTWIDTH] IN BLOOD BY AUTOMATED COUNT: 17.6 % (ref 10–15)
GFR SERPL CREATININE-BSD FRML MDRD: 72 ML/MIN/1.7M2
HCT VFR BLD AUTO: 30.8 % (ref 40–53)
HGB BLD-MCNC: 9.7 G/DL (ref 13.3–17.7)
MCH RBC QN AUTO: 31.4 PG (ref 26.5–33)
MCHC RBC AUTO-ENTMCNC: 31.5 G/DL (ref 31.5–36.5)
MCV RBC AUTO: 100 FL (ref 78–100)
PLATELET # BLD AUTO: 268 10E9/L (ref 150–450)
POTASSIUM SERPL-SCNC: 3.9 MMOL/L (ref 3.4–5.3)
RBC # BLD AUTO: 3.09 10E12/L (ref 4.4–5.9)
WBC # BLD AUTO: 6 10E9/L (ref 4–11)

## 2017-01-20 PROCEDURE — 36415 COLL VENOUS BLD VENIPUNCTURE: CPT | Performed by: INTERNAL MEDICINE

## 2017-01-20 PROCEDURE — 25000132 ZZH RX MED GY IP 250 OP 250 PS 637: Mod: GY | Performed by: INTERNAL MEDICINE

## 2017-01-20 PROCEDURE — 99233 SBSQ HOSP IP/OBS HIGH 50: CPT | Performed by: INTERNAL MEDICINE

## 2017-01-20 PROCEDURE — 25000125 ZZHC RX 250: Performed by: INTERNAL MEDICINE

## 2017-01-20 PROCEDURE — 12000000 ZZH R&B MED SURG/OB

## 2017-01-20 PROCEDURE — 85027 COMPLETE CBC AUTOMATED: CPT | Performed by: INTERNAL MEDICINE

## 2017-01-20 PROCEDURE — A9270 NON-COVERED ITEM OR SERVICE: HCPCS | Mod: GY | Performed by: INTERNAL MEDICINE

## 2017-01-20 PROCEDURE — 82565 ASSAY OF CREATININE: CPT | Performed by: INTERNAL MEDICINE

## 2017-01-20 PROCEDURE — 84132 ASSAY OF SERUM POTASSIUM: CPT | Performed by: INTERNAL MEDICINE

## 2017-01-20 RX ORDER — LEVOFLOXACIN 500 MG/1
500 TABLET, FILM COATED ORAL DAILY
Qty: 2 TABLET | Refills: 0 | DISCHARGE
Start: 2017-01-21 | End: 2017-01-21

## 2017-01-20 RX ADMIN — TAMSULOSIN HYDROCHLORIDE 0.4 MG: 0.4 CAPSULE ORAL at 21:12

## 2017-01-20 RX ADMIN — ENOXAPARIN SODIUM 40 MG: 40 INJECTION SUBCUTANEOUS at 14:36

## 2017-01-20 RX ADMIN — CHOLECALCIFEROL CAP 125 MCG (5000 UNIT) 5000 UNITS: 125 CAP at 09:46

## 2017-01-20 RX ADMIN — SALINE NASAL SPRAY 2 SPRAY: 1.5 SOLUTION NASAL at 21:12

## 2017-01-20 RX ADMIN — QUETIAPINE FUMARATE 25 MG: 25 TABLET, FILM COATED ORAL at 21:12

## 2017-01-20 RX ADMIN — ACETAMINOPHEN 1000 MG: 500 TABLET, FILM COATED ORAL at 21:11

## 2017-01-20 RX ADMIN — MEMANTINE 10 MG: 5 TABLET ORAL at 09:46

## 2017-01-20 RX ADMIN — DULOXETINE HYDROCHLORIDE 30 MG: 30 CAPSULE, DELAYED RELEASE ORAL at 09:46

## 2017-01-20 RX ADMIN — ACETAMINOPHEN 1000 MG: 500 TABLET, FILM COATED ORAL at 09:46

## 2017-01-20 RX ADMIN — SALINE NASAL SPRAY 2 SPRAY: 1.5 SOLUTION NASAL at 09:46

## 2017-01-20 RX ADMIN — Medication 12.5 MG: at 16:25

## 2017-01-20 RX ADMIN — CYANOCOBALAMIN TAB 1000 MCG 1000 MCG: 1000 TAB at 09:46

## 2017-01-20 RX ADMIN — QUETIAPINE FUMARATE 25 MG: 25 TABLET, FILM COATED ORAL at 09:46

## 2017-01-20 RX ADMIN — LEVOFLOXACIN 750 MG: 5 INJECTION, SOLUTION INTRAVENOUS at 09:45

## 2017-01-20 NOTE — PROGRESS NOTES
Patient ready to discharge back to [facility]. Spoke with daughter, Lisa, via phone ([phone]). Family has already had an informational meeting with [facility]. They are not ready to sign up for hospice care yet. Lisa knows their resources for hospice; she has contact information for when they are ready to initiate care.     Bedside staff used a lift with patient this AM. Contacted [facility] to verify they are able to take him w/lift needs. LM for Heydi RUIZ at [facility] ([phone]).     CM will continue to follow patient until discharge for any additional needs.     Kath Dooley RN, BSN, CTS  [facility]  [phone]     Addendum 1400 - Spoke with Heydi at [facility]. They are NOT able to take Adrian back until a lift is delivered. Updated Dr. Anna. He cancelled discharge for today. He will right a prescription for a Jodie hydraulic lift. Daughter, Lisa, is going to contact medical equipment company to make arrangements for payment & delivery. Discussed importance of asking about weekend delivery. CM will update MD, staff and [facility] when arrangements in place.    ander

## 2017-01-20 NOTE — DISCHARGE SUMMARY
[facility]  Discharge Summary  Name: Eder Pearson    MRN: [id number]  YOB: 1933    Age: 83 year old  Date of Discharge:  01/21/2017  Date of Admission: 1/16/2017  Primary Care Provider: Janae Jauregui Physician  Discharge Physician:  Sadi Anna MD  Discharging Service:  Hospitalist      Hospital/Discharge Diagnoses:  Eder Pearson is a 83 year old male w/ hx of dementia, anxiety, CKD, and prostate cancer admitted from his memory care unit on 1/16/2017 with cough and fevers.  Here workup was notable for fever and leukocytosis with Xray concerning for pneumonia and pyuria.  He was admitted for further care and initiated on broad spectrum abx with vancomycin, zosyn, and levofloxacin.  He was quite somnolent on admission likely from sepsis but this has resolved.  Urine culture was found to be positive for Proteus and antibiotics have subsequently been tailored to this pathogen: Discontinued vancomycin 1/18 and zosyn on 1/19 with continuation of sole levaquin.  SLP was consulted for concern for aspiration, but does ok with all textures as long as he is alert.  Though no sputum culture could be obtained I am still suspicious for pneumonia given productive sounding cough but hypoxia/fevers/leukocytosis have resolved so I feel this has been adequately treated with antibiotics given for UTI.  He has been afebrile the past two days here with his primary remaining issue being his dementia with intermittent delirium as expected and some loss of mobility related to his acute illness and deconditioning.  Prior to this admission he was an assist of two but here he has required intermittent use of a lift which we have prescribed for him and had delivered to his care facility for use as needed.   He will discharge back to his memory care facility today with two more days of levaquin.    I did readdress goals of care with his daughter, Lisa to help define DNR/DNI and the dramatic  different between this and full comfort-care status.  She will readdress this further with family but for now he remains FULL CODE.      Sepsis due to HCAP from possible GNRs and possible UTI: fevers, leukoctyosis, and cough with RUL infiltrate upon presentation, clinically with sepsis which has resolved.   Apparently multiple exposures to RSV positive kids and family recently which could be playing a role as well.  Some concern for aspiration given decreased LOC though doing OK w/ diet now.  Treated as HCAP as he lives in a memory care unit though now have tailored abx to sole levaquin.  UA with pyuria now growing proteus.  Discontinued vancomycin as no data positive for MRSA.  -continue levofloxacin.  Can transition to oral.  Provide 7 total days abx including doses here.    Dementia with behavioral issues: lives in memory care unit.  Has some behavioral issues at baseline.  Alternating with somnolence and alertness here.  Has had some agitation.  -Continue pta medications including cymbalta, namenda, and seroquel  -would make note to give seroquel after meals instead of before so he is not too sedated for eating and decrease the risk of aspiration  -per daughter he does very well with seroquel prn for agitation and would prefer using this to haldol if able.  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  Using a lift intermittently here.  Discussed w/ care coordinator to ensure this is OK w/ his facility prior to DC.  We have ordered a lift and reportedly it has been delivered already.        Discharge Disposition:  Discharged to nursing home     Allergies:  No Known Allergies     Discharge Medications:   Current Discharge Medication List      START taking these medications    Details   levofloxacin  (LEVAQUIN) 500 MG tablet Take 1 tablet (500 mg) by mouth daily for 2 days  Qty: 2 tablet, Refills: 0    Associated Diagnoses: Urinary tract infection, site unspecified; Pneumonia of right lower lobe due to infectious organism         CONTINUE these medications which have NOT CHANGED    Details   tamsulosin (FLOMAX) 0.4 MG capsule Take 0.4 mg by mouth At Bedtime      !! QUEtiapine (SEROQUEL) 25 MG tablet Take 12.5 mg by mouth daily (with dinner)      DULoxetine (CYMBALTA) 30 MG EC capsule Take 30 mg by mouth daily      Psyllium (NATURAL FIBER PO) Take 1 tsp. by mouth daily      !! QUEtiapine (SEROQUEL) 25 MG tablet Take 25 mg by mouth At Bedtime      !! QUEtiapine (SEROQUEL) 25 MG tablet Take 25 mg by mouth every morning      !! sodium chloride (OCEAN) 0.65 % nasal spray Spray 2 sprays into both nostrils 2 times daily      !! acetaminophen (TYLENOL) 500 MG tablet Take 1,000 mg by mouth 2 times daily      miconazole with skin protectant (LOPEZ ANTIFUNGAL) 2 % CREA cream Apply topically every 8 hours Apply to buttocks topically every shift for barrier cream with each toileting      !! QUEtiapine (SEROQUEL) 25 MG tablet Take 12.5 mg by mouth every 12 hours as needed      !! sodium chloride (OCEAN) 0.65 % nasal spray Spray 2 sprays into both nostrils every 8 hours as needed for congestion      senna-docusate (SENOKOT-S;PERICOLACE) 8.6-50 MG per tablet Take 1 tablet by mouth every 12 hours as needed for constipation      !! acetaminophen (TYLENOL) 500 MG tablet Take 1,000 mg by mouth daily as needed for mild pain      phenylephrine-shark liver oil-mineral oil-petrolatum (PREPARATION H) 0.25-14-74.9 % rectal ointment Place rectally daily as needed for hemorrhoids      menthol-zinc oxide (CALMOSEPTINE) 0.44-20.625 % OINT ointment Apply topically every 8 hours as needed for skin protection      !! QUETIAPINE FUMARATE PO Take 12.5 mg by mouth daily (with lunch)       Cyanocobalamin (VITAMIN B 12 PO) Take 1,000 mcg by mouth  "daily       bisacodyl (DULCOLAX) 10 MG Suppository Place 10 mg rectally daily as needed for constipation      Cholecalciferol (VITAMIN D3 PO) Take 5,000 Units by mouth daily      Memantine HCl (NAMENDA PO) Take 10 mg by mouth daily       !! - Potential duplicate medications found. Please discuss with provider.           Condition on Discharge:  Discharge condition: Stable   Discharge vitals: Blood pressure 148/86, temperature 97.3  F (36.3  C), temperature source Axillary, resp. rate 18, height 1.727 m (5' 8\"), weight 85.957 kg (189 lb 8 oz), SpO2 96 %.   Code status on discharge: FULL CODE. Family is considering changing code status but is not ready yet and plan to address this further.     History of Illness:  See detailed admission note for full details.    Physical Exam:  Blood pressure 148/86, temperature 97.3  F (36.3  C), temperature source Axillary, resp. rate 18, height 1.727 m (5' 8\"), weight 85.957 kg (189 lb 8 oz), SpO2 96 %.  Wt Readings from Last 1 Encounters:   01/20/17 85.957 kg (189 lb 8 oz)     General: Alert, awake, no acute distress.  Elderly frail man, up in chair.  HEENT: NC/AT, eyes anicteric, external occular movements intact, face symmetric.   Pulmonary: Normal chest rise, normal work of breathing.  Lungs CTA BL  Abdomen: soft, non-tender, non-distended.  Bowel Sounds Present.  No guarding.  Extremities: no deformities.  Warm, well perfused.  Skin: no rashes or lesions noted.  Warm and Dry.  Neuro: pleasantly demented, awake, makes needs known but not oriented to place or events.  No focal deficits noted. Coordination and strength grossly normal.  Psych: Appropriate affect.    Procedures other than Imaging:  None.     Imaging:  No results found for this or any previous visit (from the past 48 hour(s)).     Consultations:  No consultations were requested during this admission.       Recent Lab Results:    Recent Labs  Lab 01/20/17  0655 01/19/17  0534 01/18/17  0809   WBC 6.0 5.4 6.4   HGB " 9.7* 8.5* 8.6*   HCT 30.8* 27.1* 27.3*    99 100    227 176          NA      144   1/19/2017  NA      143   1/18/2017  NA      141   1/17/2017 CHLORIDE      110   1/19/2017  CHLORIDE      109   1/18/2017  CHLORIDE      108   1/17/2017 BUN       16   1/19/2017  BUN       17   1/18/2017  BUN       28   1/17/2017   POTASSIUM      3.9   1/20/2017  POTASSIUM      3.5   1/19/2017  POTASSIUM      4.1   1/18/2017 CO2       27   1/19/2017  CO2       26   1/18/2017  CO2       24   1/17/2017 CR     1.00   1/20/2017  CR     1.15   1/19/2017  CR     1.20   1/18/2017          Pending Results:    Unresulted Labs Ordered in the Past 30 Days of this Admission     Date and Time Order Name Status Description    1/16/2017 2243 Blood culture Preliminary     1/16/2017 2236 Blood culture Preliminary            Discharge Instructions and Follow-Up:     Discharge Procedure Orders  General info for SNF   Order Comments: Length of Stay Estimate: Long Term Care  Condition at Discharge: Stable  Level of care:skilled   Rehabilitation Potential: Poor  Admission H&P remains valid and up-to-date: Yes  Recent Chemotherapy: N/A  Use Nursing Home Standing Orders: Yes     Mantoux instructions   Order Comments: Give two-step Mantoux (PPD) Per Facility Policy Yes     Follow Up and recommended labs and tests   Order Comments: Follow up with Nursing home physician for re-evaluation.     Reason for your hospital stay   Order Comments: You were admitted for urinary infection and likely pneumonia.  We have treated this with IV antibiotics and now you have two more days of oral antibiotics to complete this course.     Activity - Up with nursing assistance   Order Comments: Assist of 2 with use of assistive devices as needed.   Order Specific Question Answer Comments   Is discharge order? Yes      Discharge Instructions   Order Comments: Please give seroquel right AFTER meals, not before so as to prevent sedation.     Full Code     Physical  Therapy Adult Consult   Order Comments: Evaluate and treat as clinically indicated.    Reason:  Deconditioning     Occupational Therapy Adult Consult   Order Comments: Evaluate and treat as clinically indicated.    Reason:  Deconditioning     Fall precautions     Advance Diet as Tolerated   Order Comments: Follow this diet upon discharge: Orders Placed This Encounter  Combination Diet Regular Diet Adult   Order Specific Question Answer Comments   Is discharge order? Yes          Total time spent in face to face contact with the patient and coordinating discharge was:  60 Minutes.

## 2017-01-20 NOTE — PLAN OF CARE
Problem: Goal Outcome Summary  Goal: Goal Outcome Summary  Outcome: No Change  VS stable. Diminished LS with non productive cough. CARRASCO. Alert x 1. Slept well throughout the night.

## 2017-01-20 NOTE — PLAN OF CARE
Problem: Goal Outcome Summary  Goal: Goal Outcome Summary  Outcome: No Change    Disoriented to place and situation. VSS. Denies pain. Up with mechanical lift, attempted A2 with walker but pt too lethargic. Tolerating regular diet, voiding (incont). Discharge pending pt ability to return to nursing home d/t mobility. See CC notes. Will continue to monitor.

## 2017-01-20 NOTE — PLAN OF CARE
Problem: Goal Outcome Summary  Goal: Goal Outcome Summary  Physical Therapy Discharge Summary    Reason for therapy discharge:    Discharged to home.    Progress towards therapy goal(s). See goals on Care Plan in [facility] electronic health record for goal details.  Goals not met.  Barriers to achieving goals:   limited tolerance for therapy.    Therapy recommendation(s):    Continue home exercise program.Work on mobility with RN and Home PT

## 2017-01-20 NOTE — PROGRESS NOTES
[facility]  Hospitalist Progress Note  Cortes Anna MD 01/20/2017    Reason for Stay (Diagnosis): Sepsis, pneumonia         Assessment and Plan:      Summary of Stay:     Eder Pearson is a 83 year old male w/ hx of dementia, anxiety, CKD, and prostate cancer admitted from his memory care unit on 1/16/2017 with cough and fevers.  Found to have fever and leukocytosis.  Xray concerning for pneumonia.  Also with pyuria.  Initiated on broad spectrum abx with vancomycin, zosyn, and levofloxacin.  Quite somnolent on admission likely from sepsis.  Fevers and leukocytosis resolved.  Urine culture positive for Proteus.  Discontinued vancomycin 1/18.  SLP consulted for concern for aspiration, but does ok with all textures as long as he is alert.  Still suspicious for possible pneumonia given productive sounding cough.  Discontinued zosyn 1/19.  Monitored overnight for fevers but none recurred.  He will discharge back to his memory care facility today with two more days of levaquin.    I did readdress goals of care with his daughter, Lisa to help define DNR/DNI and the dramatic different between this and full comfort-care status.  She will readdress this further with family but for now he remains FULL CODE.      Update: he will remain hospitalized as he needs a lift delivered to his facility prior to DC.  Discussed w/ care coordinator.  Lift ordered.     Sepsis due to HCAP from possible GNRs and possible UTI: fevers, leukoctyosis, and cough with RUL infiltrate upon presentation, clinically with sepsis which has resolved.   Apparently multiple exposures to RSV positive kids and family recently which could be playing a role as well.  Some concern for aspiration given decreased LOC though doing OK w/ diet now.  Treated as HCAP as he lives in a memory care unit though now have tailored abx to sole levaquin.  UA with pyuria now growing proteus.  Discontinued vancomycin as no data positive for MRSA.  "   -continue levofloxacin.  Can transition to oral.  Provide 7 total days abx total.    Dementia with behavioral issues: lives in memory care unit.  Has some behavioral issues at baseline.  Alternating with somnolence and alertness here.  Has had some agitation.  -Continue pta medications including cymbalta, namenda, and seroquel  -would make note to give seroquel after meals instead of before so he is not too sedated for eating and decrease the risk of aspiration  -per daughter he does very well with seroquel prn for agitation and would prefer using this to haldol if able.  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  Using a lift intermittently here.  Discussed w/ care coordinator to ensure this is OK w/ his facility prior to DC.    DVT Prophylaxis: Enoxaprain (Lovenox) SQ  Code Status: Full Code  FEN: regular diet    Discharge Dispo: memory care unit  Estimated Disch Date / # of Days until Disch: likely d/c tomorrow to memory care unit if no fevers overnight and alert.          Interval History (Subjective):      I assumed care today  Discharge planning  Transitioned to oral abx    Discussed care plan with daughter including code status  Update: he will remain hospitalized as he needs a lift delivered to his facility prior to DC.  Discussed w/ care coordinator.  Lift ordered.                  Physical Exam:      Last Vital Signs:  /86 mmHg  Temp(Src) 97.3  F (36.3  C) (Axillary)  Resp 18  Ht 1.727 m (5' 8\")  Wt 85.957 kg (189 lb 8 oz)  BMI 28.82 kg/m2  SpO2 96%    Constitutional: alert, NAD  Eyes: sclera white   HEENT:   MMM  Respiratory:  Odd upper airway wheeze, that is not stridor  Cardiovascular: RRR.  No murmur   GI: non-tender, not distended, bowel sounds present  Skin: warm, no rash  "   Musculoskeletal/extremities:  No edema  Neurologic: alert, confused, moves all extremities  Psychiatric: calm         Medications:      All current medications were reviewed with changes reflected in problem list.         Data:      All new lab and imaging data was reviewed.   Labs:    Recent Labs  Lab 01/16/17  2325 01/16/17  2252 01/16/17 2158   CULT No growth after 3 days >100,000 colonies/mL Proteus mirabilis* No growth after 3 days       Recent Labs  Lab 01/20/17  0655 01/19/17  0534 01/18/17  0809   WBC 6.0 5.4 6.4   HGB 9.7* 8.5* 8.6*   HCT 30.8* 27.1* 27.3*    99 100    227 176       Recent Labs  Lab 01/20/17 0655 01/19/17  0534 01/18/17  0809 01/17/17  0643 01/16/17 2158   NA  --  144 143 141 140   POTASSIUM 3.9 3.5 4.1 3.7 4.0   CHLORIDE  --  110* 109 108 106   CO2  --  27 26 24 24   ANIONGAP  --  7 8 9 10   GLC  --  91 91 109* 116*   BUN  --  16 17 28 32*   CR 1.00 1.15 1.20 1.16 1.28*   GFRESTIMATED 72 61 58* 60* 54*   GFRESTBLACK 87 73 70 73 65   ARANZA  --  8.8 8.6 8.0* 8.2*   PROTTOTAL  --   --   --   --  6.7*   ALBUMIN  --   --   --   --  2.9*   BILITOTAL  --   --   --   --  1.1   ALKPHOS  --   --   --   --  47   AST  --   --   --   --  23   ALT  --   --   --   --  14       Imaging:  No results found for this or any previous visit (from the past 24 hour(s)).       Cortes Anna MD

## 2017-01-20 NOTE — PROGRESS NOTES
Obtained prescription from Dr. Anna for osvaldo lift. Faxed ([phone]) to [facility]. They have received it. Their staff will see if they can deliver lift this afternoon/evening. If not, they will deliver Monday. Patient will discharge when lift is at facility. Updated bedside RN, chg RN and family. Daughter, Lisa, spoke with Heydi at [facility]. They are agreeable to these arrangements. Pt can dc over weekend if equipment is present.     CM will continue to follow patient until discharge for any additional needs.     Kath Dooley, RN, BSN, CTS  [facility]  [phone]

## 2017-01-20 NOTE — PLAN OF CARE
Problem: Goal Outcome Summary  Goal: Goal Outcome Summary  Outcome: No Change  Pt oriented to self. IV is saline locked. Up to chair with heavy assist of 2 this afternoon, in chair for dinner, family assisted with eating. Back to bed with lift and assist of 2. Incontinent of bladder. Possibly will d/c back to memory care tomorrow.

## 2017-01-21 VITALS
HEIGHT: 68 IN | SYSTOLIC BLOOD PRESSURE: 95 MMHG | WEIGHT: 189.5 LBS | OXYGEN SATURATION: 98 % | RESPIRATION RATE: 18 BRPM | TEMPERATURE: 98.2 F | BODY MASS INDEX: 28.72 KG/M2 | DIASTOLIC BLOOD PRESSURE: 68 MMHG

## 2017-01-21 LAB
CREAT SERPL-MCNC: 1.06 MG/DL (ref 0.66–1.25)
GFR SERPL CREATININE-BSD FRML MDRD: 67 ML/MIN/1.7M2

## 2017-01-21 PROCEDURE — 99239 HOSP IP/OBS DSCHRG MGMT >30: CPT | Performed by: INTERNAL MEDICINE

## 2017-01-21 PROCEDURE — 25000132 ZZH RX MED GY IP 250 OP 250 PS 637: Mod: GY | Performed by: INTERNAL MEDICINE

## 2017-01-21 PROCEDURE — 82565 ASSAY OF CREATININE: CPT | Performed by: INTERNAL MEDICINE

## 2017-01-21 PROCEDURE — 25000125 ZZHC RX 250: Performed by: INTERNAL MEDICINE

## 2017-01-21 PROCEDURE — A9270 NON-COVERED ITEM OR SERVICE: HCPCS | Mod: GY | Performed by: INTERNAL MEDICINE

## 2017-01-21 PROCEDURE — 36415 COLL VENOUS BLD VENIPUNCTURE: CPT | Performed by: INTERNAL MEDICINE

## 2017-01-21 RX ORDER — LEVOFLOXACIN 500 MG/1
500 TABLET, FILM COATED ORAL DAILY
Qty: 2 TABLET | Refills: 0 | Status: SHIPPED | OUTPATIENT
Start: 2017-01-22 | End: 2017-01-24

## 2017-01-21 RX ADMIN — CYANOCOBALAMIN TAB 1000 MCG 1000 MCG: 1000 TAB at 09:27

## 2017-01-21 RX ADMIN — SALINE NASAL SPRAY 2 SPRAY: 1.5 SOLUTION NASAL at 10:00

## 2017-01-21 RX ADMIN — CHOLECALCIFEROL CAP 125 MCG (5000 UNIT) 5000 UNITS: 125 CAP at 09:26

## 2017-01-21 RX ADMIN — ACETAMINOPHEN 1000 MG: 500 TABLET, FILM COATED ORAL at 09:26

## 2017-01-21 RX ADMIN — DULOXETINE HYDROCHLORIDE 30 MG: 30 CAPSULE, DELAYED RELEASE ORAL at 09:27

## 2017-01-21 RX ADMIN — LEVOFLOXACIN 750 MG: 5 INJECTION, SOLUTION INTRAVENOUS at 10:09

## 2017-01-21 RX ADMIN — QUETIAPINE FUMARATE 25 MG: 25 TABLET, FILM COATED ORAL at 09:26

## 2017-01-21 RX ADMIN — MEMANTINE 10 MG: 5 TABLET ORAL at 09:26

## 2017-01-21 NOTE — PLAN OF CARE
Problem: Goal Outcome Summary  Goal: Goal Outcome Summary  Outcome: No Change  Continues to be combative with cares trying to hit and kick the staff.  Incontinent of urine 2 times during the night.  Turned and repositioned every few hours but patient would turn himself right back on the his back.  Plan is for discharge back to memory care today.

## 2017-01-21 NOTE — PLAN OF CARE
Problem: Goal Outcome Summary  Goal: Goal Outcome Summary  Outcome: No Change  Neuro: Alert to self, disoriented to time and place, sundowners, pleasant and cooperative with cares  VS: VSS  Pain: denies pain  Resp: WDL  Cardiac: WDL  GI/: incontinent of bowel and bladder  Diet: tolerating regular diet, requires tray setup and prompts.  Skin/mobility: redness to scrotum, uses lift for mobility   Lift delivered to LTC facility, plans to d/c tomorrow.  Will continue to monitor and provide supportive care.

## 2017-01-21 NOTE — PLAN OF CARE
Problem: Goal Outcome Summary  Goal: Goal Outcome Summary    Pt disoriented to place and situation. VSS, soft BP. Denies pain. Up with mechanical lift. Tolerating regular diet. Voiding without difficulty. DC today via transport at 1230. Packet sent with patient, AVS reviewed with family, questions answered. Paper rx in packet for PO abx. IV removed.

## 2017-01-21 NOTE — PROGRESS NOTES
Contacted [facility] this AM to verify delivery of osvaldo lift. Spoke with staff - lift delivered last evening. HE to transport at 1230pm today. Updated chg RN, staff, family.     CM will continue to follow patient until discharge for any additional needs.     Kath Dooley RN, BSN, CTS  [facility]  [phone]

## 2017-01-23 LAB
BACTERIA SPEC CULT: NO GROWTH
BACTERIA SPEC CULT: NO GROWTH
Lab: NORMAL
Lab: NORMAL
MICRO REPORT STATUS: NORMAL
MICRO REPORT STATUS: NORMAL
SPECIMEN SOURCE: NORMAL
SPECIMEN SOURCE: NORMAL

## 2017-02-07 ENCOUNTER — DOCUMENTATION ONLY (OUTPATIENT)
Dept: OTHER | Facility: CLINIC | Age: 82
End: 2017-02-07

## 2017-02-07 DIAGNOSIS — Z71.89 ACP (ADVANCE CARE PLANNING): Primary | Chronic | ICD-10-CM

## 2017-05-05 ENCOUNTER — APPOINTMENT (OUTPATIENT)
Dept: GENERAL RADIOLOGY | Facility: CLINIC | Age: 82
DRG: 871 | End: 2017-05-05
Attending: EMERGENCY MEDICINE
Payer: MEDICARE

## 2017-05-05 ENCOUNTER — HOSPITAL ENCOUNTER (INPATIENT)
Facility: CLINIC | Age: 82
LOS: 8 days | Discharge: HOME HEALTH | DRG: 871 | End: 2017-05-13
Attending: EMERGENCY MEDICINE | Admitting: INTERNAL MEDICINE
Payer: MEDICARE

## 2017-05-05 DIAGNOSIS — H10.31 ACUTE BACTERIAL CONJUNCTIVITIS OF RIGHT EYE: ICD-10-CM

## 2017-05-05 DIAGNOSIS — R41.0 DELIRIUM: ICD-10-CM

## 2017-05-05 DIAGNOSIS — J18.9 PNA (PNEUMONIA): ICD-10-CM

## 2017-05-05 PROBLEM — J69.0 ASPIRATION PNEUMONIA (H): Status: ACTIVE | Noted: 2017-05-05

## 2017-05-05 LAB
ALBUMIN SERPL-MCNC: 3 G/DL (ref 3.4–5)
ALBUMIN UR-MCNC: 30 MG/DL
ALP SERPL-CCNC: 54 U/L (ref 40–150)
ALT SERPL W P-5'-P-CCNC: 18 U/L (ref 0–70)
ANION GAP SERPL CALCULATED.3IONS-SCNC: 4 MMOL/L (ref 3–14)
APPEARANCE UR: CLEAR
AST SERPL W P-5'-P-CCNC: 18 U/L (ref 0–45)
BASOPHILS # BLD AUTO: 0 10E9/L (ref 0–0.2)
BASOPHILS NFR BLD AUTO: 0.2 %
BILIRUB SERPL-MCNC: 1 MG/DL (ref 0.2–1.3)
BILIRUB UR QL STRIP: NEGATIVE
BUN SERPL-MCNC: 24 MG/DL (ref 7–30)
CALCIUM SERPL-MCNC: 9.1 MG/DL (ref 8.5–10.1)
CHLORIDE SERPL-SCNC: 106 MMOL/L (ref 94–109)
CO2 SERPL-SCNC: 30 MMOL/L (ref 20–32)
COLOR UR AUTO: YELLOW
CREAT SERPL-MCNC: 1.11 MG/DL (ref 0.66–1.25)
DIFFERENTIAL METHOD BLD: ABNORMAL
EOSINOPHIL # BLD AUTO: 0 10E9/L (ref 0–0.7)
EOSINOPHIL NFR BLD AUTO: 0.3 %
ERYTHROCYTE [DISTWIDTH] IN BLOOD BY AUTOMATED COUNT: 17.7 % (ref 10–15)
GFR SERPL CREATININE-BSD FRML MDRD: 63 ML/MIN/1.7M2
GLUCOSE SERPL-MCNC: 98 MG/DL (ref 70–99)
GLUCOSE UR STRIP-MCNC: NEGATIVE MG/DL
HCT VFR BLD AUTO: 31.1 % (ref 40–53)
HGB BLD-MCNC: 9.9 G/DL (ref 13.3–17.7)
HGB UR QL STRIP: ABNORMAL
IMM GRANULOCYTES # BLD: 0.1 10E9/L (ref 0–0.4)
IMM GRANULOCYTES NFR BLD: 0.4 %
INTERPRETATION ECG - MUSE: NORMAL
KETONES UR STRIP-MCNC: NEGATIVE MG/DL
LACTATE BLD-SCNC: 1 MMOL/L (ref 0.7–2.1)
LEUKOCYTE ESTERASE UR QL STRIP: NEGATIVE
LYMPHOCYTES # BLD AUTO: 1.6 10E9/L (ref 0.8–5.3)
LYMPHOCYTES NFR BLD AUTO: 13.9 %
MCH RBC QN AUTO: 31.9 PG (ref 26.5–33)
MCHC RBC AUTO-ENTMCNC: 31.8 G/DL (ref 31.5–36.5)
MCV RBC AUTO: 100 FL (ref 78–100)
MONOCYTES # BLD AUTO: 1 10E9/L (ref 0–1.3)
MONOCYTES NFR BLD AUTO: 8.1 %
MUCOUS THREADS #/AREA URNS LPF: PRESENT /LPF
NEUTROPHILS # BLD AUTO: 9 10E9/L (ref 1.6–8.3)
NEUTROPHILS NFR BLD AUTO: 77.1 %
NITRATE UR QL: NEGATIVE
NRBC # BLD AUTO: 0 10*3/UL
NRBC BLD AUTO-RTO: 0 /100
PH UR STRIP: 5 PH (ref 5–7)
PLATELET # BLD AUTO: 233 10E9/L (ref 150–450)
POTASSIUM SERPL-SCNC: 4.2 MMOL/L (ref 3.4–5.3)
PROT SERPL-MCNC: 7.2 G/DL (ref 6.8–8.8)
RBC # BLD AUTO: 3.1 10E12/L (ref 4.4–5.9)
RBC #/AREA URNS AUTO: 7 /HPF (ref 0–2)
SODIUM SERPL-SCNC: 140 MMOL/L (ref 133–144)
SP GR UR STRIP: 1.03 (ref 1–1.03)
SQUAMOUS #/AREA URNS AUTO: <1 /HPF (ref 0–1)
URN SPEC COLLECT METH UR: ABNORMAL
UROBILINOGEN UR STRIP-MCNC: 0 MG/DL (ref 0–2)
WBC # BLD AUTO: 11.7 10E9/L (ref 4–11)
WBC #/AREA URNS AUTO: <1 /HPF (ref 0–2)

## 2017-05-05 PROCEDURE — 81001 URINALYSIS AUTO W/SCOPE: CPT | Performed by: EMERGENCY MEDICINE

## 2017-05-05 PROCEDURE — A9270 NON-COVERED ITEM OR SERVICE: HCPCS | Performed by: EMERGENCY MEDICINE

## 2017-05-05 PROCEDURE — 87040 BLOOD CULTURE FOR BACTERIA: CPT | Performed by: EMERGENCY MEDICINE

## 2017-05-05 PROCEDURE — 25000128 H RX IP 250 OP 636: Performed by: INTERNAL MEDICINE

## 2017-05-05 PROCEDURE — 83605 ASSAY OF LACTIC ACID: CPT | Performed by: EMERGENCY MEDICINE

## 2017-05-05 PROCEDURE — 99285 EMERGENCY DEPT VISIT HI MDM: CPT | Mod: 25

## 2017-05-05 PROCEDURE — 25000132 ZZH RX MED GY IP 250 OP 250 PS 637: Mod: GY | Performed by: INTERNAL MEDICINE

## 2017-05-05 PROCEDURE — 99223 1ST HOSP IP/OBS HIGH 75: CPT | Mod: AI | Performed by: INTERNAL MEDICINE

## 2017-05-05 PROCEDURE — A9270 NON-COVERED ITEM OR SERVICE: HCPCS | Mod: GY | Performed by: INTERNAL MEDICINE

## 2017-05-05 PROCEDURE — 87086 URINE CULTURE/COLONY COUNT: CPT | Performed by: EMERGENCY MEDICINE

## 2017-05-05 PROCEDURE — 25000132 ZZH RX MED GY IP 250 OP 250 PS 637: Performed by: EMERGENCY MEDICINE

## 2017-05-05 PROCEDURE — 96365 THER/PROPH/DIAG IV INF INIT: CPT

## 2017-05-05 PROCEDURE — 80053 COMPREHEN METABOLIC PANEL: CPT | Performed by: EMERGENCY MEDICINE

## 2017-05-05 PROCEDURE — 96361 HYDRATE IV INFUSION ADD-ON: CPT

## 2017-05-05 PROCEDURE — 25000128 H RX IP 250 OP 636: Performed by: EMERGENCY MEDICINE

## 2017-05-05 PROCEDURE — 85025 COMPLETE CBC W/AUTO DIFF WBC: CPT | Performed by: EMERGENCY MEDICINE

## 2017-05-05 PROCEDURE — 36415 COLL VENOUS BLD VENIPUNCTURE: CPT | Performed by: EMERGENCY MEDICINE

## 2017-05-05 PROCEDURE — 25800025 ZZH RX 258: Performed by: EMERGENCY MEDICINE

## 2017-05-05 PROCEDURE — 71010 XR CHEST 1 VW: CPT

## 2017-05-05 PROCEDURE — 12000007 ZZH R&B INTERMEDIATE

## 2017-05-05 PROCEDURE — 93005 ELECTROCARDIOGRAM TRACING: CPT

## 2017-05-05 RX ORDER — POTASSIUM CHLORIDE 7.45 MG/ML
10 INJECTION INTRAVENOUS
Status: DISCONTINUED | OUTPATIENT
Start: 2017-05-05 | End: 2017-05-13 | Stop reason: HOSPADM

## 2017-05-05 RX ORDER — POLYMYXIN B SULFATE AND TRIMETHOPRIM 1; 10000 MG/ML; [USP'U]/ML
2 SOLUTION OPHTHALMIC
Status: DISCONTINUED | OUTPATIENT
Start: 2017-05-05 | End: 2017-05-05

## 2017-05-05 RX ORDER — POTASSIUM CHLORIDE 1500 MG/1
20-40 TABLET, EXTENDED RELEASE ORAL
Status: DISCONTINUED | OUTPATIENT
Start: 2017-05-05 | End: 2017-05-13 | Stop reason: HOSPADM

## 2017-05-05 RX ORDER — PIPERACILLIN SODIUM, TAZOBACTAM SODIUM 4; .5 G/20ML; G/20ML
4.5 INJECTION, POWDER, LYOPHILIZED, FOR SOLUTION INTRAVENOUS ONCE
Status: COMPLETED | OUTPATIENT
Start: 2017-05-05 | End: 2017-05-05

## 2017-05-05 RX ORDER — ONDANSETRON 4 MG/1
4 TABLET, ORALLY DISINTEGRATING ORAL EVERY 6 HOURS PRN
Status: DISCONTINUED | OUTPATIENT
Start: 2017-05-05 | End: 2017-05-13 | Stop reason: HOSPADM

## 2017-05-05 RX ORDER — POLYMYXIN B SULFATE AND TRIMETHOPRIM 1; 10000 MG/ML; [USP'U]/ML
2 SOLUTION OPHTHALMIC 4 TIMES DAILY
Status: DISCONTINUED | OUTPATIENT
Start: 2017-05-05 | End: 2017-05-13 | Stop reason: HOSPADM

## 2017-05-05 RX ORDER — MAGNESIUM SULFATE HEPTAHYDRATE 40 MG/ML
4 INJECTION, SOLUTION INTRAVENOUS EVERY 4 HOURS PRN
Status: DISCONTINUED | OUTPATIENT
Start: 2017-05-05 | End: 2017-05-13 | Stop reason: HOSPADM

## 2017-05-05 RX ORDER — ONDANSETRON 2 MG/ML
4 INJECTION INTRAMUSCULAR; INTRAVENOUS EVERY 6 HOURS PRN
Status: DISCONTINUED | OUTPATIENT
Start: 2017-05-05 | End: 2017-05-13 | Stop reason: HOSPADM

## 2017-05-05 RX ORDER — POTASSIUM CL/LIDO/0.9 % NACL 10MEQ/0.1L
10 INTRAVENOUS SOLUTION, PIGGYBACK (ML) INTRAVENOUS
Status: DISCONTINUED | OUTPATIENT
Start: 2017-05-05 | End: 2017-05-13 | Stop reason: HOSPADM

## 2017-05-05 RX ORDER — BISACODYL 10 MG
10 SUPPOSITORY, RECTAL RECTAL DAILY PRN
Status: DISCONTINUED | OUTPATIENT
Start: 2017-05-05 | End: 2017-05-13 | Stop reason: HOSPADM

## 2017-05-05 RX ORDER — DULOXETIN HYDROCHLORIDE 30 MG/1
30 CAPSULE, DELAYED RELEASE ORAL DAILY
Status: DISCONTINUED | OUTPATIENT
Start: 2017-05-06 | End: 2017-05-13 | Stop reason: HOSPADM

## 2017-05-05 RX ORDER — PROCHLORPERAZINE MALEATE 5 MG
5 TABLET ORAL EVERY 6 HOURS PRN
Status: DISCONTINUED | OUTPATIENT
Start: 2017-05-05 | End: 2017-05-13 | Stop reason: HOSPADM

## 2017-05-05 RX ORDER — AMPICILLIN AND SULBACTAM 2; 1 G/1; G/1
3 INJECTION, POWDER, FOR SOLUTION INTRAMUSCULAR; INTRAVENOUS EVERY 6 HOURS
Status: DISCONTINUED | OUTPATIENT
Start: 2017-05-06 | End: 2017-05-10

## 2017-05-05 RX ORDER — SODIUM CHLORIDE 9 MG/ML
INJECTION, SOLUTION INTRAVENOUS CONTINUOUS
Status: DISCONTINUED | OUTPATIENT
Start: 2017-05-05 | End: 2017-05-10

## 2017-05-05 RX ORDER — QUETIAPINE FUMARATE 25 MG/1
25 TABLET, FILM COATED ORAL EVERY MORNING
Status: DISCONTINUED | OUTPATIENT
Start: 2017-05-06 | End: 2017-05-09

## 2017-05-05 RX ORDER — POTASSIUM CHLORIDE 1.5 G/1.58G
20-40 POWDER, FOR SOLUTION ORAL
Status: DISCONTINUED | OUTPATIENT
Start: 2017-05-05 | End: 2017-05-13 | Stop reason: HOSPADM

## 2017-05-05 RX ORDER — PROCHLORPERAZINE 25 MG
12.5 SUPPOSITORY, RECTAL RECTAL EVERY 12 HOURS PRN
Status: DISCONTINUED | OUTPATIENT
Start: 2017-05-05 | End: 2017-05-13 | Stop reason: HOSPADM

## 2017-05-05 RX ORDER — NALOXONE HYDROCHLORIDE 0.4 MG/ML
.1-.4 INJECTION, SOLUTION INTRAMUSCULAR; INTRAVENOUS; SUBCUTANEOUS
Status: DISCONTINUED | OUTPATIENT
Start: 2017-05-05 | End: 2017-05-13 | Stop reason: HOSPADM

## 2017-05-05 RX ORDER — MEMANTINE HYDROCHLORIDE 5 MG/1
10 TABLET ORAL DAILY
Status: DISCONTINUED | OUTPATIENT
Start: 2017-05-06 | End: 2017-05-13 | Stop reason: HOSPADM

## 2017-05-05 RX ORDER — POTASSIUM CHLORIDE 29.8 MG/ML
20 INJECTION INTRAVENOUS
Status: DISCONTINUED | OUTPATIENT
Start: 2017-05-05 | End: 2017-05-13 | Stop reason: HOSPADM

## 2017-05-05 RX ORDER — ACETAMINOPHEN 325 MG/1
650 TABLET ORAL EVERY 4 HOURS PRN
Status: DISCONTINUED | OUTPATIENT
Start: 2017-05-05 | End: 2017-05-13 | Stop reason: HOSPADM

## 2017-05-05 RX ORDER — AMOXICILLIN 250 MG
1-2 CAPSULE ORAL 2 TIMES DAILY PRN
Status: DISCONTINUED | OUTPATIENT
Start: 2017-05-05 | End: 2017-05-13 | Stop reason: HOSPADM

## 2017-05-05 RX ORDER — TAMSULOSIN HYDROCHLORIDE 0.4 MG/1
0.4 CAPSULE ORAL EVERY EVENING
Status: DISCONTINUED | OUTPATIENT
Start: 2017-05-05 | End: 2017-05-13 | Stop reason: HOSPADM

## 2017-05-05 RX ORDER — QUETIAPINE FUMARATE 25 MG/1
25 TABLET, FILM COATED ORAL AT BEDTIME
Status: DISCONTINUED | OUTPATIENT
Start: 2017-05-05 | End: 2017-05-13 | Stop reason: HOSPADM

## 2017-05-05 RX ADMIN — TAMSULOSIN HYDROCHLORIDE 0.4 MG: 0.4 CAPSULE ORAL at 22:45

## 2017-05-05 RX ADMIN — SODIUM CHLORIDE: 9 INJECTION, SOLUTION INTRAVENOUS at 17:25

## 2017-05-05 RX ADMIN — SODIUM CHLORIDE, POTASSIUM CHLORIDE, SODIUM LACTATE AND CALCIUM CHLORIDE 500 ML: 600; 310; 30; 20 INJECTION, SOLUTION INTRAVENOUS at 14:30

## 2017-05-05 RX ADMIN — QUETIAPINE FUMARATE 25 MG: 25 TABLET, FILM COATED ORAL at 22:45

## 2017-05-05 RX ADMIN — QUETIAPINE FUMARATE 12.5 MG: 25 TABLET, FILM COATED ORAL at 16:52

## 2017-05-05 RX ADMIN — PIPERACILLIN AND TAZOBACTAM 4.5 G: 4; .5 INJECTION, POWDER, FOR SOLUTION INTRAVENOUS at 16:24

## 2017-05-05 RX ADMIN — POLYMYXIN B SULFATE AND TRIMETHOPRIM 2 DROP: 1; 10000 SOLUTION OPHTHALMIC at 22:43

## 2017-05-05 ASSESSMENT — ACTIVITIES OF DAILY LIVING (ADL)
FALL_HISTORY_WITHIN_LAST_SIX_MONTHS: YES
AMBULATION: 3-->ASSISTIVE EQUIPMENT AND PERSON
TOILETING: 4-->COMPLETELY DEPENDENT
WHICH_OF_THE_ABOVE_FUNCTIONAL_RISKS_HAD_A_RECENT_ONSET_OR_CHANGE?: COGNITION
COGNITION: 2 - DIFFICULTY WITH ORGANIZING THOUGHTS
RETIRED_COMMUNICATION: 2-->DIFFICULTY UNDERSTANDING AND SPEAKING (NOT RELATED TO LANGUAGE BARRIER)
TRANSFERRING: 3-->ASSISTIVE EQUIPMENT AND PERSON
RETIRED_EATING: 3-->ASSISTIVE EQUIPMENT AND PERSON
NUMBER_OF_TIMES_PATIENT_HAS_FALLEN_WITHIN_LAST_SIX_MONTHS: 1
DRESS: 4-->COMPLETELY DEPENDENT
BATHING: 4-->COMPLETELY DEPENDENT
SWALLOWING: 2-->DIFFICULTY SWALLOWING LIQUIDS/FOODS

## 2017-05-05 ASSESSMENT — ENCOUNTER SYMPTOMS
WEAKNESS: 1
VOMITING: 0
SHORTNESS OF BREATH: 1

## 2017-05-05 NOTE — IP AVS SNAPSHOT
"    [facility]: [phone]                                              INTERAGENCY TRANSFER FORM - PHYSICIAN ORDERS   2017                    Hospital Admission Date: 2017  CALEB ARELLANO   : 1933  Sex: Male        Attending Provider: Cortes Anna MD     Allergies:  No Known Allergies    Infection:  None   Service:  GENERAL MEDI    Ht:  1.829 m (6' 0.01\")   Wt:  86.2 kg (190 lb 1.6 oz)   Admission Wt:  86.2 kg (190 lb 1.6 oz)    BMI:  25.78 kg/m 2   BSA:  2.09 m 2            Patient PCP Information     Provider PCP Type    [facility]      ED Clinical Impression     Diagnosis Description Comment Added By Time Added    PNA (pneumonia) [J18.9] PNA (pneumonia) [J18.9]  Jory Payan 2017  4:11 PM    Delirium [R41.0] Delirium [R41.0]  aPm Ervin MD 2017  4:39 PM    Acute bacterial conjunctivitis of right eye [H10.31] Acute bacterial conjunctivitis of right eye [H10.31]  Pam Ervin MD 2017  4:39 PM      Hospital Problems as of 2017              Priority Class Noted POA    * (Principal)Aspiration pneumonitis (H) Medium  2017 Yes    Acute respiratory failure with hypoxia (H) Medium  2017 Unknown    Late onset Alzheimer's disease with behavioral disturbance Medium  2017 Unknown    Acute bacterial conjunctivitis of right eye Medium  2017 Unknown    Infectious encephalopathy Medium  2017 Unknown      Non-Hospital Problems as of 2017              Priority Class Noted    Urinary frequency Medium  2017    HAP (hospital-acquired pneumonia) Medium  2017    ACP (advance care planning)   2017      Code Status History     Date Active Date Inactive Code Status Order ID Comments User Context    2017 10:58 AM  Full Code [id number]  Pacheco Prince MD Outpatient    2017  5:14 PM 2017 10:58 AM Full Code [id number]  Cortes Anna MD " Inpatient    1/20/2017 11:27 AM 5/5/2017  5:14 PM Full Code [id number]  Cortes Anna MD Outpatient    1/17/2017  2:05 AM 1/20/2017 11:27 AM Full Code [id number]  Rudy Grey MD Inpatient         Medication Review      CONTINUE these medications which may have CHANGED, or have new prescriptions. If we are uncertain of the size of tablets/capsules you have at home, strength may be listed as something that might have changed.        Dose / Directions Comments    * SEROQUEL 25 MG tablet   This may have changed:  Another medication with the same name was removed. Continue taking this medication, and follow the directions you see here.   Generic drug:  QUEtiapine        Dose:  25 mg   Take 25 mg by mouth At Bedtime   Refills:  0        * SEROQUEL 25 MG tablet   This may have changed:  Another medication with the same name was removed. Continue taking this medication, and follow the directions you see here.   Generic drug:  QUEtiapine        Dose:  12.5 mg   Take 12.5 mg by mouth every 12 hours as needed   Refills:  0        * Notice:  This list has 2 medication(s) that are the same as other medications prescribed for you. Read the directions carefully, and ask your doctor or other care provider to review them with you.      CONTINUE these medications which have NOT CHANGED        Dose / Directions Comments    * acetaminophen 500 MG tablet   Commonly known as:  TYLENOL        Dose:  1000 mg   Take 1,000 mg by mouth 2 times daily , at 0700 & 1700   Refills:  0        * acetaminophen 500 MG tablet   Commonly known as:  TYLENOL        Dose:  1000 mg   Take 1,000 mg by mouth daily as needed for mild pain   Refills:  0        bisacodyl 10 MG Suppository   Commonly known as:  DULCOLAX        Dose:  10 mg   Place 10 mg rectally daily as needed for constipation   Refills:  0        DULoxetine 30 MG EC capsule   Commonly known as:  CYMBALTA        Dose:  30 mg   Take 30 mg by mouth daily   Refills:  0         FLOMAX 0.4 MG capsule   Generic drug:  tamsulosin        Dose:  0.4 mg   Take 0.4 mg by mouth At Bedtime   Refills:  0        menthol-zinc oxide 0.44-20.625 % Oint ointment   Commonly known as:  CALMOSEPTINE        Apply topically every 8 hours as needed for skin protection   Refills:  0        miconazole with skin protectant 2 % Crea cream        Apply topically every 8 hours Apply to buttocks topically every shift for barrier cream with each toileting   Refills:  0        NAMENDA PO        Dose:  10 mg   Take 10 mg by mouth daily   Refills:  0        NATURAL FIBER PO        Dose:  1 tsp.   Take 1 tsp. by mouth daily   Refills:  0        phenylephrine-shark liver oil-mineral oil-petrolatum 0.25-14-74.9 % rectal ointment   Commonly known as:  PREPARATION H        Place rectally daily as needed for hemorrhoids   Refills:  0        senna-docusate 8.6-50 MG per tablet   Commonly known as:  SENOKOT-S;PERICOLACE        Dose:  1 tablet   Take 1 tablet by mouth every 12 hours as needed for constipation   Refills:  0        * sodium chloride 0.65 % nasal spray   Commonly known as:  OCEAN        Dose:  2 spray   Spray 2 sprays into both nostrils 2 times daily , at 0900 & 2100   Refills:  0        * sodium chloride 0.65 % nasal spray   Commonly known as:  OCEAN        Dose:  2 spray   Spray 2 sprays into both nostrils every 8 hours as needed for congestion   Refills:  0        VITAMIN B 12 PO        Dose:  1000 mcg   Take 1,000 mcg by mouth daily   Refills:  0        VITAMIN D3 PO        Dose:  2000 Units   Take 2,000 Units by mouth daily   Refills:  0        * Notice:  This list has 4 medication(s) that are the same as other medications prescribed for you. Read the directions carefully, and ask your doctor or other care provider to review them with you.              Further instructions from your care team       RESUMPTION OF HOME CARE SERVICES PT/OT and SLP     Speech Therapy has been following you while you have been in  the hospital they have Recommended a continue dysphagia diet 2 and nectar thick liquids with 1:1 supervision. Pt should be fully upright and alert for all PO, take small single sips/bties, alternate consistencies, and pace self. Hold PO should pt demonstrate overt s/sx of aspiration or if pt is not alert enough to safely take PO.   Speech therapy to follow pt at care center           After Care     Activity - Up with nursing assistance           Advance Diet as Tolerated       Follow this diet upon discharge:       Snacks/Supplements Adult: Other - Please comment; Magic shake; Between Meals      Snacks/Supplements Adult: Magic Cup; With Meals      Combination Diet Dysphagia Diet Level 2: Mechan Altered; Nectar Thickened Liquids (pre-thickened or use instant food thickener)       General info for SNF       Length of Stay Estimate: Long term care.  Condition at Discharge: Stable  Level of care:skilled   Rehabilitation Potential: Poor  Admission H&P remains valid and up-to-date: Yes  Recent Chemotherapy: N/A  Use Nursing Home Standing Orders: Yes       Mantoux instructions       Give two-step Mantoux (PPD) Per Facility Policy Yes             Referrals     Physical Therapy Adult Consult       Evaluate and treat as clinically indicated.    Reason:  Work towards previous baseline       Speech Language Path Adult Consult       Evaluate and treat as clinically indicated.    Reason:  Newly identified aspiration.             Follow-Up Appointment Instructions     Future Labs/Procedures    Follow Up and recommended labs and tests     Comments:    Follow up with Nursing home physician per routine.      Follow-Up Appointment Instructions     Follow Up and recommended labs and tests       Follow up with Nursing home physician per routine.             Statement of Approval     Ordered          05/13/17 1059  I have reviewed and agree with all the recommendations and orders detailed in this document.  EFFECTIVE NOW     Approved and  electronically signed by:  Pacheco Prince MD

## 2017-05-05 NOTE — ED NOTES
Bed: ED30  Expected date: 5/5/17  Expected time: 1:13 PM  Means of arrival: Ambulance  Comments:  DEVIKA 84 yo M

## 2017-05-05 NOTE — IP AVS SNAPSHOT
"    [facility] ORTHO SPINE: [phone]                                              INTERAGENCY TRANSFER FORM - LAB / IMAGING / EKG / EMG RESULTS   2017                    Hospital Admission Date: 2017  CALEB ARELLANO   : 1933  Sex: Male        Attending Provider: Cortes Anna MD     Allergies:  No Known Allergies    Infection:  None   Service:  GENERAL MEDI    Ht:  1.829 m (6' 0.01\")   Wt:  86.2 kg (190 lb 1.6 oz)   Admission Wt:  86.2 kg (190 lb 1.6 oz)    BMI:  25.78 kg/m 2   BSA:  2.09 m 2            Patient PCP Information     Provider PCP Type    [facility]         Lab Results - 3 Days      Magnesium [[id number]]  Resulted: 17, Result status: Final result    Ordering provider: Cortes Anna MD  17 0000 Resulting lab: [facility]    Specimen Information    Type Source Collected On   Blood  17 0637          Components       Value Reference Range Flag Lab   Magnesium 1.8 1.6 - 2.3 mg/dL  FrRdHs            Basic metabolic panel [[id number]]  Resulted: 17, Result status: Final result    Ordering provider: Pacheco Prince MD  17 0000 Resulting lab: [facility]    Specimen Information    Type Source Collected On   Blood  17 0637          Components       Value Reference Range Flag Lab   Sodium 139 133 - 144 mmol/L  FrRdHs   Potassium 3.5 3.4 - 5.3 mmol/L  FrRdHs   Chloride 105 94 - 109 mmol/L  FrRdHs   Carbon Dioxide 28 20 - 32 mmol/L  FrRdHs   Anion Gap 6 3 - 14 mmol/L  FrRdHs   Glucose 96 70 - 99 mg/dL  FrRdHs   Urea Nitrogen 13 7 - 30 mg/dL  FrRdHs   Creatinine 0.98 0.66 - 1.25 mg/dL  FrRdHs   GFR Estimate 73 >60 mL/min/1.7m2  FrRdHs   Comment:  Non  GFR Calc   GFR Estimate If Black 89 >60 mL/min/1.7m2  FrRd   Comment:  African American GFR Calc   Calcium 8.6 8.5 - 10.1 mg/dL  FrRdHs            CBC with platelets [[id number]] (Abnormal)  Resulted: " 05/12/17 0653, Result status: Final result    Ordering provider: Pacheco Prince MD  05/12/17 0000 Resulting lab: [facility]    Specimen Information    Type Source Collected On   Blood  05/12/17 0637          Components       Value Reference Range Flag Lab   WBC 9.4 4.0 - 11.0 10e9/L  FrRdHs   RBC Count 2.75 4.4 - 5.9 10e12/L L FrRdHs   Hemoglobin 8.5 13.3 - 17.7 g/dL L FrRdHs   Hematocrit 27.0 40.0 - 53.0 % L FrRdHs   MCV 98 78 - 100 fl  FrRdHs   MCH 30.9 26.5 - 33.0 pg  FrRdHs   MCHC 31.5 31.5 - 36.5 g/dL  FrRdHs   RDW 17.5 10.0 - 15.0 % H FrRdHs   Platelet Count 375 150 - 450 10e9/L  FrRdHs            Potassium [[id number]]  Resulted: 05/11/17 2054, Result status: Final result    Ordering provider: Pacheco Prince MD  05/11/17 1838 Resulting lab: [facility]    Specimen Information    Type Source Collected On   Blood  05/11/17 2036          Components       Value Reference Range Flag Lab   Potassium 3.6 3.4 - 5.3 mmol/L  Rd            Procalcitonin [[id number]]  Resulted: 05/11/17 1208, Result status: Final result    Ordering provider: Awilda Uribe MD  05/11/17 0001 Resulting lab: [facility]    Specimen Information    Type Source Collected On   Blood  05/11/17 0617          Components       Value Reference Range Flag Lab   [facility]  [facility]   Result:         <0.05  <0.05 ng/ml  Normal  Recommendation: Very low risk of bacterial infection.   Discourage antibiotics unless strong clinical suspicion for serious infection.              Basic metabolic panel [[id number]] (Abnormal)  Resulted: 05/11/17 0704, Result status: Final result    Ordering provider: Awilda Uribe MD  05/11/17 0001 Resulting lab: [facility]    Specimen Information    Type Source Collected On   Blood  05/11/17 0617          Components       Value Reference Range Flag Lab   Sodium 142 133 - 144 mmol/L  FrRdHs   Potassium 3.2 3.4 - 5.3 mmol/L L FrRdHs   Chloride  105 94 - 109 mmol/L  FrRdHs   Carbon Dioxide 30 20 - 32 mmol/L  FrRdHs   Anion Gap 7 3 - 14 mmol/L  FrRdHs   Glucose 95 70 - 99 mg/dL  FrRdHs   Urea Nitrogen 10 7 - 30 mg/dL  FrRdHs   Creatinine 0.92 0.66 - 1.25 mg/dL  FrRdHs   GFR Estimate 79 >60 mL/min/1.7m2  FrRd   Comment:  Non  GFR Calc   GFR Estimate If Black -- >60 mL/min/1.7m2  FrRdHs   Result:         >90   GFR Calc     Calcium 8.5 8.5 - 10.1 mg/dL  FrRdHs   Result:              Magnesium [[id number]]  Resulted: 05/11/17 0704, Result status: Final result    Ordering provider: Awilda Uribe MD  05/11/17 0001 Resulting lab: [facility]    Specimen Information    Type Source Collected On   Blood  05/11/17 0617          Components       Value Reference Range Flag Lab   Magnesium 1.9 1.6 - 2.3 mg/dL  Rd            NT proBNP inpatient and ED [[id number]] (Abnormal)  Resulted: 05/11/17 0704, Result status: Final result    Ordering provider: Awilda Uribe MD  05/11/17 0001 Resulting lab: [facility]    Specimen Information    Type Source Collected On   Blood  05/11/17 0617          Components       Value Reference Range Flag Lab   N-Terminal Pro BNP Inpatient 2878 0 - 1800 pg/mL H [facility]   Comment:         Reference range shown and results flagged as abnormal are suggested inpatient   cut points for confirming diagnosis if CHF in an acute setting. Establishing   a   baseline value for each individual patient is useful for follow-up. An   inpatient or emergency department NT-proPBNP <300 pg/mL effectively rules out   acute CHF, with 99% negative predictive value.  The outpatient non-acute reference range for ruling out CHF is:   0-125 pg/mL (age 18 to less than 75)   0-450 pg/mL (age 75 yrs and older)              CBC with platelets [[id number]] (Abnormal)  Resulted: 05/11/17 0632, Result status: Final result    Ordering provider: Awilda Uribe MD  05/11/17 0001 Resulting lab: [facility]    Specimen Information    Type Source Collected On   Blood  05/11/17 0617          Components       Value Reference Range Flag Lab   WBC 8.1 4.0 - 11.0 10e9/L  FrRdHs   RBC Count 2.69 4.4 - 5.9 10e12/L L FrRdHs   Hemoglobin 8.3 13.3 - 17.7 g/dL L FrRdHs   Hematocrit 26.5 40.0 - 53.0 % L FrRdHs   MCV 99 78 - 100 fl  FrRdHs   MCH 30.9 26.5 - 33.0 pg  FrRdHs   MCHC 31.3 31.5 - 36.5 g/dL L FrRdHs   RDW 17.2 10.0 - 15.0 % H FrRdHs   Platelet Count 335 150 - 450 10e9/L  FrRdHs            Blood culture [[id number]]  Resulted: 05/11/17 0310, Result status: Final result    Ordering provider: Pam Ervin MD  05/05/17 1336 Resulting lab: [facility]    Specimen Information    Type Source Collected On   Blood  05/05/17 1523          Components       Value Reference Range Flag Lab   Specimen Description Blood Right Hand   75   Culture Micro No growth   75   Micro Report Status FINAL 05/11/2017   75            Blood culture [[id number]]  Resulted: 05/11/17 0310, Result status: Final result    Ordering provider: Pam Ervin MD  05/05/17 1336 Resulting lab: [facility]    Specimen Information    Type Source Collected On   Blood  05/05/17 1358          Components       Value Reference Range Flag Lab   Specimen Description Blood Left Arm   75   Special Requests Aerobic and anaerobic bottles received   [facility]   Culture Micro No growth   75   Micro Report Status FINAL 05/11/2017   75            Glucose by meter [[id number]] (Abnormal)  Resulted: 05/11/17 0256, Result status: Final result    Ordering provider: Cortes Anna MD  05/11/17 0247 Resulting lab: POINT OF CARE TEST, GLUCOSE    Specimen Information    Type Source Collected On     05/11/17 0247          Components       Value Reference Range Flag Lab   Glucose 104 70 - 99 mg/dL H 170            Potassium [[id number]]  Resulted: 05/10/17 1918, Result  status: Final result    Ordering provider: Awilda Uribe MD  05/10/17 1438 Resulting lab: [facility]    Specimen Information    Type Source Collected On   Blood  05/10/17 1845          Components       Value Reference Range Flag Lab   Potassium 3.4 3.4 - 5.3 mmol/L  FrRdHs            Magnesium [[id number]]  Resulted: 05/10/17 0744, Result status: Final result    Ordering provider: Awilda Uribe MD  05/10/17 0001 Resulting lab: [facility]    Specimen Information    Type Source Collected On   Blood  05/10/17 0705          Components       Value Reference Range Flag Lab   Magnesium 1.8 1.6 - 2.3 mg/dL  FrRdHs            Basic metabolic panel [[id number]] (Abnormal)  Resulted: 05/10/17 0744, Result status: Final result    Ordering provider: Awilda Uribe MD  05/10/17 0001 Resulting lab: [facility]    Specimen Information    Type Source Collected On   Blood  05/10/17 0705          Components       Value Reference Range Flag Lab   Sodium 145 133 - 144 mmol/L H FrRdHs   Potassium 3.3 3.4 - 5.3 mmol/L L FrRdHs   Chloride 110 94 - 109 mmol/L H FrRdHs   Carbon Dioxide 28 20 - 32 mmol/L  FrRdHs   Anion Gap 7 3 - 14 mmol/L  FrRdHs   Glucose 96 70 - 99 mg/dL  FrRdHs   Urea Nitrogen 11 7 - 30 mg/dL  FrRdHs   Creatinine 0.83 0.66 - 1.25 mg/dL  FrRdHs   GFR Estimate 88 >60 mL/min/1.7m2  FrRdHs   Comment:  Non  GFR Calc   GFR Estimate If Black -- >60 mL/min/1.7m2  FrRdHs   Result:         >90   GFR Calc     Calcium 8.4 8.5 - 10.1 mg/dL L FrRdHs   Result:              CBC with platelets [[id number]] (Abnormal)  Resulted: 05/10/17 0720, Result status: Final result    Ordering provider: Awilda Uribe MD  05/10/17 0001 Resulting lab: [facility]    Specimen Information    Type Source Collected On   Blood  05/10/17 0705          Components       Value Reference Range Flag Lab   WBC 8.5 4.0 - 11.0 10e9/L  FrRdHs   RBC Count 2.66 4.4  "- 5.9 10e12/L L FrRdHs   Hemoglobin 8.4 13.3 - 17.7 g/dL L FrRdHs   Hematocrit 26.6 40.0 - 53.0 % L FrRdHs    78 - 100 fl  FrRdHs   MCH 31.6 26.5 - 33.0 pg  FrRdHs   MCHC 31.6 31.5 - 36.5 g/dL  FrRdHs   RDW 17.4 10.0 - 15.0 % H FrRdHs   Platelet Count 311 150 - 450 10e9/L  FrRd            Testing Performed By     Lab - Abbreviation Name Director Address Valid Date Range    12 - FrRdHs [facility] Unknown [address]  [facility] [address] 05/08/15 1057 - Present    14 - FrStLb [facility] Unknown [address] [place] [address] 05/08/15 1057 - Present    75 - Unknown [facility] Unknown [address] [facility] [address] 01/15/15 1019 - Present    170 - Unknown POINT OF CARE TEST, GLUCOSE Unknown Unknown 10/31/11 1114 - Present            Unresulted Labs (24h ago through future)    Start       Ordered    05/09/17 0600  Platelet count  (Pharmacological Prophylaxis - enoxaparin (LOVENOX) *Use only if creatinine clearance is greater than 30 mL/min)  EVERY THREE DAYS,   Routine     Comments:  Repeat every 3 days while on VTE prophylaxis.  Notify provider and hold enoxaparin if platelet count falls by 50% of baseline. If no result is listed, this lab has not been done the past 365 days. LATEST LAB RESULT: Platelet Count (10e9/L)       Date                     Value                 05/06/2017               215              ----------    05/06/17 1408    05/09/17 0000  Creatinine  (Pharmacological Prophylaxis - enoxaparin (LOVENOX) *Use only if creatinine clearance is greater than 30 mL/min)  EVERY THREE DAYS,   Routine     Comments:  Repeat every 3 days while on VTE prophylaxis.    05/06/17 1408    Unscheduled  Potassium  (Potassium Replacement - \"Standard\" - For K levels less than 3.4 mmol/L - UU,UR,UA,RH,SH,PH,WY )  CONDITIONAL (SPECIFY),   Routine     Comments:  Obtain Potassium Level for these conditions:  *IF no potassium result within 24 " "hours before initiation of order set, draw potassium level with next lab collect.    *2 HOURS AFTER last IV potassium replacement dose and 4 hours after an oral replacement dose.  *Next morning after potassium dose.     Repeat Potassium Replacement if necessary.    05/05/17 1714    Unscheduled  Magnesium  (Magnesium Replacement -  Adult - \"Standard\" - Replacement for all levels less than 1.6 mg/dL )  CONDITIONAL (SPECIFY),   Routine     Comments:  Obtain Magnesium Level for these conditions:  *IF no magnesium result within 24 hrs before initiation of order set, draw magnesium level with next lab collect.    *2 HOURS AFTER last magnesium replacement dose when magnesium replacement given for level less than 1.6   *Next morning after magnesium dose.     Repeat Magnesium Replacement if necessary.    05/05/17 1714         Imaging Results - 3 Days      XR Chest Port 1 View [[id number]]  Resulted: 05/10/17 0951, Result status: Final result    Ordering provider: Awilda Uribe MD  05/09/17 1508 Resulted by: Sanjeev Roblero MD    Performed: 05/09/17 1729 - 05/10/17 0923 Resulting lab: RADIOLOGY RESULTS    Narrative:       CHEST PORTABLE ONE VIEW  5/10/2017 9:23 AM     HISTORY: Re-evaluate new fever.     COMPARISON: Chest x-ray 5/5/2017.      Impression:       IMPRESSION: Portable view of the chest is performed. There is a new  patchy infiltrate at the lung bases concerning for pneumonia. Lungs  are hypoaerated which is unchanged. Heart appears mildly prominent in  size, but is unchanged. No pneumothorax. Probable trace bilateral  pleural effusions. Subtle underlying CHF is also possible.    SANJEEV ROBLERO MD      Testing Performed By     Lab - Abbreviation Name Director Address Valid Date Range    104 - Rad Rslts RADIOLOGY RESULTS Unknown Unknown 02/16/05 1553 - Present            Encounter-Level Documents:     There are no encounter-level documents.      Order-Level Documents:     There are no order-level documents. "

## 2017-05-05 NOTE — PHARMACY-ADMISSION MEDICATION HISTORY
Admission medication history interview status for this patient is complete. See [facility] admission navigator for allergy information, prior to admission medications and immunization status.     Medication history interview source(s):   Medication history resources (including written lists, pill bottles, clinic record): MAR from [facility]  Primary pharmacy:     Changes made to PTA medication list:  Added:  None  Deleted:  None  Changed:  Vitamin D 5000 units to 2000 units    Actions taken by pharmacist (provider contacted, etc):None     Additional medication history information:None    Medication reconciliation/reorder completed by provider prior to medication history? Yes    For patients on insulin therapy:  No     Prior to Admission medications    Medication Sig Last Dose Taking? Auth Provider   acetaminophen (TYLENOL) 500 MG tablet Take 1,000 mg by mouth 2 times daily , at 0700 & 1700 5/5/2017 at 0700 Yes Unknown, Entered By History   Cyanocobalamin (VITAMIN B 12 PO) Take 1,000 mcg by mouth daily  5/5/2017 at 0800 Yes Reported, Patient   Cholecalciferol (VITAMIN D3 PO) Take 2,000 Units by mouth daily  5/5/2017 at 0900 Yes Reported, Patient   Memantine HCl (NAMENDA PO) Take 10 mg by mouth daily 5/5/2017 at 0900 Yes Reported, Patient   tamsulosin (FLOMAX) 0.4 MG capsule Take 0.4 mg by mouth At Bedtime 5/4/2017 at 2000  Unknown, Entered By History   QUEtiapine (SEROQUEL) 25 MG tablet Take 12.5 mg by mouth daily (with dinner) 5/4/2017 at 1800  Unknown, Entered By History   DULoxetine (CYMBALTA) 30 MG EC capsule Take 30 mg by mouth daily 5/4/2017 at 2000  Unknown, Entered By History   Psyllium (NATURAL FIBER PO) Take 1 tsp. by mouth daily 5/5/2017 at 0900  Unknown, Entered By History   QUEtiapine (SEROQUEL) 25 MG tablet Take 25 mg by mouth At Bedtime 5/4/2017 at 2000  Unknown, Entered By History   QUEtiapine (SEROQUEL) 25 MG tablet Take 25 mg by mouth every morning 5/5/2017 at 0800  Unknown, Entered By History   sodium  chloride (OCEAN) 0.65 % nasal spray Spray 2 sprays into both nostrils 2 times daily , at 0900 & 2100 5/5/2017 at 0900  Unknown, Entered By History   miconazole with skin protectant (LOPEZ ANTIFUNGAL) 2 % CREA cream Apply topically every 8 hours Apply to buttocks topically every shift for barrier cream with each toileting am  Unknown, Entered By History   QUEtiapine (SEROQUEL) 25 MG tablet Take 12.5 mg by mouth every 12 hours as needed prn  Unknown, Entered By History   sodium chloride (OCEAN) 0.65 % nasal spray Spray 2 sprays into both nostrils every 8 hours as needed for congestion prn  Unknown, Entered By History   senna-docusate (SENOKOT-S;PERICOLACE) 8.6-50 MG per tablet Take 1 tablet by mouth every 12 hours as needed for constipation prn  Unknown, Entered By History   acetaminophen (TYLENOL) 500 MG tablet Take 1,000 mg by mouth daily as needed for mild pain prn  Unknown, Entered By History   phenylephrine-shark liver oil-mineral oil-petrolatum (PREPARATION H) 0.25-14-74.9 % rectal ointment Place rectally daily as needed for hemorrhoids prn  Unknown, Entered By History   menthol-zinc oxide (CALMOSEPTINE) 0.44-20.625 % OINT ointment Apply topically every 8 hours as needed for skin protection prn  Unknown, Entered By History   QUETIAPINE FUMARATE PO Take 12.5 mg by mouth daily (with lunch)  5/4/2017 at 1300  Reported, Patient   bisacodyl (DULCOLAX) 10 MG Suppository Place 10 mg rectally daily as needed for constipation prn  Reported, Patient

## 2017-05-05 NOTE — ED PROVIDER NOTES
History     Chief Complaint:  Fever and cough    HPI   Eder Pearson is a 83 year old male with a history of dementia and prostate cancer who presents to the emergency department today for evaluation of fever and cough.  Patient lives in a nursing facility.  Fever of 101 noted today. The daughter has noticed that the patient has been generally weaker and not able to eat on his own which is a change from baseline for the last few days.  His daughter reports a course cough.  He has a hx of admission in January for HCAP and suspicion for aspiration. Additionally, the patient has been having some discharge from his right eye. She notes last night the patient was able to eat all of his supper and has been drinking plenty of fluids. The patient had a fever this morning with an increase in his cough and congestion. There has been no vomiting.     Allergies:  No Known Drug Allergies      Medications:     Tamsulosin (FLOMAX) 0.4 MG capsule  Quetiapine (SEROQUEL) 25 MG tablet  Duloxetine (CYMBALTA) 30 MG EC capsule  Psyllium (NATURAL FIBER PO)  Quetiapine (SEROQUEL) 25 MG tablet  Sodium chloride (OCEAN) 0.65 % nasal spray  Acetaminophen (TYLENOL) 500 MG tablet  Miconazole with skin protectant (LOPEZ ANTIFUNGAL) 2 % CREA cream  Quetiapine (SEROQUEL) 25 MG tablet  Sodium chloride (OCEAN) 0.65 % nasal spray  Senna-docusate (SENOKOT-S;PERICOLACE) 8.6-50 MG per tablet  Acetaminophen (TYLENOL) 500 MG tablet  Phenylephrine-shark liver oil-mineral oil-petrolatum (PREPARATION H) 0.25-14-74.9 % rectal ointment  Menthol-zinc oxide (CALMOSEPTINE) 0.44-20.625 % OINT ointment  QUETIAPINE FUMARATE PO  Cyanocobalamin (VITAMIN B 12 PO)  Bisacodyl (DULCOLAX) 10 MG Suppository  Cholecalciferol (VITAMIN D3 PO)  Memantine hcl (NAMENDA PO)    Past Medical History:     Anxiety  Prostate Cancer  Dementia      Past Surgical History:     The patient does not have any pertinent past surgical history.      Family History:     No past pertinent  family history.      Social History:  Presents alone.  Negative for tobacco use.  Negative for alcohol use.  Marital Status:       Review of Systems   Respiratory: Positive for shortness of breath.    Gastrointestinal: Negative for vomiting.   Neurological: Positive for weakness.   All other systems reviewed and are negative.    Physical Exam   Vitals:  Patient Vitals for the past 24 hrs:   BP Temp Temp src Pulse Resp SpO2   05/05/17 1317 128/87 98.2  F (36.8  C) Oral 76 20 90 %         Physical Exam    Gen: alert, lethargic.   HEENT: PERRL, oropharynx clear  Eyes: Mattering and conjunctiva injection of the right eye  Periorbital skin is normal   Neck: normal ROM  CV: RRR, no murmurs  Pulm: breath sounds equal, dimished at bases, course breath sounds on right  Abd: Soft, nontender  Back: no evidence of injury, no cva tenderness  MSK: no deformity, moves all extremities  Skin: no rash  Neuro: does respond to name and voice.     Emergency Department Course     ECG:  ECG taken at 1355, ECG read at 1407  Normal sinus rhythm  Left axis deviation  Right bundle branch block   Abnormal ECG  Rate 66 bpm. DE interval 178. QRS duration 130. QT/QTc 446/467. P-R-T axes 20 -41 31.     Imaging:  Radiology findings were communicated with the family who voiced understanding of the findings.    XR Chest 1 View   IMPRESSION: Shallow inspiration with some minimal atelectasis right   lung base. I cannot exclude a nodular density in the right midlung not   seen on the previous exam dated 1/16/2017.      WIL CHARLES MD     Laboratory:  Laboratory findings were communicated with the family who voiced understanding of the findings.    CBC: WBC 11.7 (H), HGB 9.9 (L),   CMP: Albumin: 3.0 (L) o/w WNL (Creatinine: 1.11)  UA: pending  Urine Culture Aerobic Bacterial: pending  Lactic Acid (Collected at 1358): 1.0  Blood culture: pending     Interventions:  1430 Lactate Ringers 500 ml IV  Zosyn 4.5 g IV  Polytrim 2 ggt right  eye  seroquel 12.5 mg     Emergency Department Course:  Nursing notes and vitals reviewed.  I performed an exam of the patient as documented above.   IV was inserted and blood was drawn for laboratory testing, results above.  The patient provided a urine sample here in the emergency department. This was sent for laboratory testing, findings above.   The patient was sent for imaging per above while in the emergency department, results above.    3:50 PM: I spoke with Dr. Anna of the hospitalist service regarding patient's presentation, findings, and plan of care.   I discussed the treatment plan with the patient. They expressed understanding of this plan and consented to admission. I discussed the patient with Dr. Anna, who will admit the patient to a monitored bed for further evaluation and treatment.   I personally reviewed the laboratory and imaging results with the spouse and daughter and answered all related questions prior to admission.    Impression & Plan      Medical Decision Making:  The patient presents for fever, cough, and altered mental status. Daughter reports poor PO intake. There have been questions of aspirations for him in the past. Recent admission in January for healthcare associated pneumonia reviewed. Today, patient is afebrile at his nursing facility. Vital signs are stable here without evidence of hypotension or shock. Leukocytosis with left shift noted. Laboratory studies otherwise unremarkable. Notably normal lactic acid. Chest x-ray shows an inspiration, however, there is a density in the right mid lung that correlates with a history of cough as well as his exam of crackles over the lower right lung fields.  Suspect aspiration PNA as patient's daughter reports cough with eating, right sided crackles and questionable infiltrate in RML on CXR.  Awaiting UA at the time of admission. However, given change in mental status and poor PO intake, he will require admission. Patient  with discussed with Dr. Anna of the hospitalist service and admitting to a medical bed.      Diagnosis:    ICD-10-CM    1. PNA (pneumonia) J18.9 UA reflex to Microscopic     Urine Culture Aerobic Bacterial   2. Delirium R41.0    3. Acute bacterial conjunctivitis of right eye H10.31      Disposition:   Admission     Scribe Disclosure:  I, Christiano Marquez, am serving as a scribe at 1:18 PM on 5/5/2017 to document services personally performed by Pam Ervin*, based on my observations and the provider's statements to me.   5/5/2017   [facility] EMERGENCY DEPARTMENT       Pam Ervin MD  05/05/17 7743

## 2017-05-05 NOTE — H&P
[facility]  Hospitalist Admission Note  Name: Eder Pearson    MRN: [id number]  YOB: 1933    Age: 83 year old  Date of admission: 5/5/2017  Primary care provider: [facility]    Chief Complaint:  Shortness of breath    Eder Pearson is a 83 year old male with PMH including anxiety, dementia who presents from his memory care facility with shortness of breath found to have fever, leukocytosis and subtle RML infiltrate in the setting of a suspected recent aspiration episode.  He is being admitted for treatment of aspiration pneumonia and also has a right eye conjunctivitis which appears bacterial.    Assessment and Plan:   1. Cough with dyspnea: He has productive cough, dyspnea and subtle RML infiltrate with a mild leukocytosis.  Given that it sounds as though he had an aspiration episode while eating a couple of days ago I would treat this as aspiration pneumonia.  Will treat with unasyn and ask for an SLP consultation.  Provide supplemental O2 as needed and await culture results.  For completeness and given his inability to provide a history will also check a urine culture given that he had a UTI in January.    2.   Unilateral conjunctivitis: right eye.  this does seem to be somewhat purulent and may be bacterial.  In addition to systemic antibiotics will continue polytrim as started in the ER.  Monitor.      3.   Dementia with behavioral issues: lives in memory care unit.  Has some behavioral issues at baseline.    -Continue pta medications including cymbalta, namenda, and seroquel once verified.  -give seroquel after meals instead of before so he is not too sedated for eating and decrease the risk of aspiration  -does better with seroquel prn for agitation and would prefer using this to haldol if able.  Will take meds crushed in pudding generally     4.  Reported hx of Prostate cancer with BPH: continue flomax     5.  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  Physical deconditioning: prior to admission was often using a lift (provided at the time of his last admission here in January) but 1-2 months ago after his last admission had actually rallied to the point where he was moving fairly well with therapies at his [facility] care unit.  His daughter would like a trial of therapies here.  Will likely need to use a lift initially though.     DVT Prophylaxis: Pneumatic Compression Devices  Code Status: Full Code: I did discuss this at length with his daughter as I feel they should at least consider DNR/DNI status given his advanced dementia.  Family has been discussing this and will continue to do so.  Discharge Dispo: admit to inpatient status      History of Present Illness:  Eder Pearson is a 83 year old male with PMH including anxiety, dementia who presents from his [facility] facility with shortness of breath.  His daughter accompanied him to the ER and provides much of the history as the patient is disoriented.  He has been somewhat weaker than usual and cannot eat on his own for the past few days which is a change for him.  It sounds as though he was coughing while eating a meal a couple of days ago and there was concern for aspiration. He has had some discharge from his right eye.  This morning he did have a fever and was coughing more with some purulent sputum and also more creamy discharge from his right eye.    Per his daughter he actually did fairly well after being discharged from [facility] in January and did well with therapies at his [facility].  He does have a lift there but got to the point that he could walk with assistance following therapies.       Past Medical History:  Past Medical History:   Diagnosis Date     Anxiety      Dementia      Prostate cancer (H)      Past Surgical History:  History reviewed. No pertinent surgical history.    Social History: resides at [facility].  Accompanied by his daughter.  Also has a son  involved in his life.    Social History   Substance Use Topics     Smoking status: Never Smoker     Smokeless tobacco: Not on file     Alcohol use No       Family History:  Reviewed and non-contributory in this case.    Allergies:  No Known Allergies  Medications:  Prior to Admission Medications   Prescriptions Last Dose Informant Patient Reported? Taking?   Cholecalciferol (VITAMIN D3 PO)   Yes No   Sig: Take 5,000 Units by mouth daily   Cyanocobalamin (VITAMIN B 12 PO)   Yes No   Sig: Take 1,000 mcg by mouth daily    DULoxetine (CYMBALTA) 30 MG EC capsule   Yes No   Sig: Take 30 mg by mouth daily   Memantine HCl (NAMENDA PO)   Yes No   Sig: Take 10 mg by mouth daily   Psyllium (NATURAL FIBER PO)   Yes No   Sig: Take 1 tsp. by mouth daily   QUETIAPINE FUMARATE PO   Yes No   Sig: Take 12.5 mg by mouth daily (with lunch)    QUEtiapine (SEROQUEL) 25 MG tablet   Yes No   Sig: Take 12.5 mg by mouth daily (with dinner)   QUEtiapine (SEROQUEL) 25 MG tablet   Yes No   Sig: Take 25 mg by mouth At Bedtime   QUEtiapine (SEROQUEL) 25 MG tablet   Yes No   Sig: Take 25 mg by mouth every morning   QUEtiapine (SEROQUEL) 25 MG tablet   Yes No   Sig: Take 12.5 mg by mouth every 12 hours as needed   acetaminophen (TYLENOL) 500 MG tablet   Yes No   Sig: Take 1,000 mg by mouth 2 times daily   acetaminophen (TYLENOL) 500 MG tablet   Yes No   Sig: Take 1,000 mg by mouth daily as needed for mild pain   bisacodyl (DULCOLAX) 10 MG Suppository   Yes No   Sig: Place 10 mg rectally daily as needed for constipation   menthol-zinc oxide (CALMOSEPTINE) 0.44-20.625 % OINT ointment   Yes No   Sig: Apply topically every 8 hours as needed for skin protection   miconazole with skin protectant (LOPEZ ANTIFUNGAL) 2 % CREA cream   Yes No   Sig: Apply topically every 8 hours Apply to buttocks topically every shift for barrier cream with each toileting   phenylephrine-shark liver oil-mineral oil-petrolatum (PREPARATION H) 0.25-14-74.9 % rectal ointment    Yes No   Sig: Place rectally daily as needed for hemorrhoids   senna-docusate (SENOKOT-S;PERICOLACE) 8.6-50 MG per tablet   Yes No   Sig: Take 1 tablet by mouth every 12 hours as needed for constipation   sodium chloride (OCEAN) 0.65 % nasal spray   Yes No   Sig: Spray 2 sprays into both nostrils 2 times daily   sodium chloride (OCEAN) 0.65 % nasal spray   Yes No   Sig: Spray 2 sprays into both nostrils every 8 hours as needed for congestion   tamsulosin (FLOMAX) 0.4 MG capsule   Yes No   Sig: Take 0.4 mg by mouth At Bedtime      Facility-Administered Medications: None         Review of Systems:  A Comprehensive greater than 10 system review of systems was carried out.  Pertinent positives and negatives are noted above.  Otherwise negative for contributory information.     Physical Exam:  Blood pressure 128/87, pulse 76, temperature 98.2  F (36.8  C), temperature source Oral, resp. rate 20, SpO2 90 %.  Wt Readings from Last 1 Encounters:   01/20/17 86 kg (189 lb 8 oz)     Exam:  General: elderly man, eyes closed, seems somewhat agitated, fidgeting in bed.  Non-toxic but looks chronically ill and fairly frail.  HEENT: NC/AT, right eye injected with obvious purulent discharge.   external occular movements intact but doesn't follow commands. otherwise face symmetric.   Cardiac: RRR, S1, S2.  No murmurs appreciated.  Pulmonary: Normal chest rise, normal work of breathing.  Lungs CTA BL  Abdomen: soft, non-tender, non-distended.  Bowel Sounds Present.  No guarding.  Extremities: no edema.  no deformities.  Warm, well perfused.  Skin: no rashes or lesions noted.  Warm and Dry.  Neuro: somewhat agitated, doesn't follow commands, not participating, moans, makes non-sensical sounds periodically.  No clonus or tremor or seizure activity..  Psych: Appropriate affect.    Data:  EKG:  Sinus rhythm with RBBB (not new).    Imaging:  Results for orders placed or performed during the hospital encounter of 05/05/17   XR Chest 1 View     Narrative    XR CHEST 1 VW 5/5/2017 3:21 PM    HISTORY: fever, cough      Impression    IMPRESSION: Shallow inspiration with some minimal atelectasis right  lung base. I cannot exclude a nodular density in the right midlung not  seen on the previous exam dated 1/16/2017.    WIL CHARLES MD     Labs:    Recent Labs  Lab 05/05/17  1358   WBC 11.7*   HGB 9.9*   HCT 31.1*                Lab Results   Component Value Date     05/05/2017     01/19/2017     01/18/2017    Lab Results   Component Value Date    CHLORIDE 106 05/05/2017    CHLORIDE 110 01/19/2017    CHLORIDE 109 01/18/2017    Lab Results   Component Value Date    BUN 24 05/05/2017    BUN 16 01/19/2017    BUN 17 01/18/2017      Lab Results   Component Value Date    POTASSIUM 4.2 05/05/2017    POTASSIUM 3.9 01/20/2017    POTASSIUM 3.5 01/19/2017    Lab Results   Component Value Date    CO2 30 05/05/2017    CO2 27 01/19/2017    CO2 26 01/18/2017    Lab Results   Component Value Date    CR 1.11 05/05/2017    CR 1.06 01/21/2017    CR 1.00 01/20/2017            Sadi Anna MD  Hospitalist  [facility]

## 2017-05-05 NOTE — IP AVS SNAPSHOT
[facility][note 3] Spine    [address]    [facility] [address]    Phone:  [phone]    Fax:  [phone]                                       After Visit Summary   5/5/2017    Eder Pearson    MRN: [id number]           After Visit Summary Signature Page     I have received my discharge instructions, and my questions have been answered. I have discussed any challenges I see with this plan with the nurse or doctor.    ..........................................................................................................................................  Patient/Patient Representative Signature      ..........................................................................................................................................  Patient Representative Print Name and Relationship to Patient    ..................................................               ................................................  Date                                            Time    ..........................................................................................................................................  Reviewed by Signature/Title    ...................................................              ..............................................  Date                                                            Time

## 2017-05-05 NOTE — ED NOTES
Patient presents with a complaint of shortness of breath and cough since today. Patient comes from a memory care facility with a history of alzheimer. ABC intact, Disoriented x4. Wife and daughter at bedside.

## 2017-05-05 NOTE — ED NOTES
[facility]  ED Nurse Handoff Report    Eder Pearson is a 83 year old male   ED Chief complaint: Shortness of Breath  . ED Diagnosis:   Final diagnoses:   PNA (pneumonia)     Allergies: No Known Allergies    Code Status: Full Code  Activity level - Baseline/Home:  Independent. Activity Level - Current:   Total Care. Lift room needed: Yes. Bariatric: No   Needed: No   Isolation: No. Infection: Not Applicable.     Vital Signs:   Vitals:    05/05/17 1315 05/05/17 1317 05/05/17 1330 05/05/17 1545   BP: 128/87 128/87     Pulse:  76     Resp:  20     Temp:  98.2  F (36.8  C)     TempSrc:  Oral     SpO2:  90% 96% 95%     Cardiac Rhythm:  ,      Pain level:    Patient confused: Yes. Patient Falls Risk: Yes.     Patient Report - Initial Complaint: Shortness of breath. Focused Assessment: Diminished lung sounds bilaterally lower lobes  Tests Performed: Chest Xray, Blood work, UA. Abnormal Results: See epic.   Treatments provided: 500 mL LR  Family Comments: Family at bedside  OBS brochure/video discussed/provided to patient:  N/A  ED Medications:   Medications   piperacillin-tazobactam (ZOSYN) 4.5 g vial to attach to  mL bag (not administered)   trimethoprim-polymyxin b (POLYTRIM) ophthalmic solution 2 drop (not administered)   lactated ringers BOLUS 500 mL (0 mLs Intravenous Stopped 5/5/17 1607)     Drips infusing:  Yes     ED Nurse Name/Phone Number: Marquis Rich,     4:23 PM    RECEIVING UNIT ED HANDOFF REVIEW    Above ED Nurse Handoff Report was reviewed: Yes  Reviewed by: Tiffanie Virk on May 5, 2017 at 4:52 PM

## 2017-05-05 NOTE — IP AVS SNAPSHOT
` `     [facility] ORTHO SPINE: [phone]                 INTERAGENCY TRANSFER FORM - NOTES (H&P, Discharge Summary, Consults, Procedures, Therapies)   2017                    Hospital Admission Date: 2017  CALEB PEARSON   : 1933  Sex: Male        Patient PCP Information     Provider PCP Type    Janae Physician Shania General         History & Physicals      H&P by Cortes Anna MD at 2017  3:46 PM     Author:  Cortes Anna MD Service:  Hospitalist Author Type:  Physician    Filed:  2017  4:25 PM Date of Service:  2017  3:46 PM Note Created:  2017  3:46 PM    Status:  Addendum :  Cortes Anna MD (Physician)         [facility]  Hospitalist Admission Note  Name: Caleb Pearson    MRN: [id number]  YOB: 1933    Age: 83 year old  Date of admission: 2017  Primary care provider: Janae Jauregui Physician    Chief Complaint:  Shortness of breath    Caleb Pearson is a 83 year old male with PMH including anxiety, dementia who presents from his memory care facility with shortness of breath[MF1.1] found to have fever, leukocytosis and subtle RML infiltrate in the setting of a suspected recent aspiration episode.  He is being admitted for treatment of aspiration pneumonia and also has a right eye conjunctivitis which appears bacterial.[MF1.2]    Assessment and Plan:   1. Cough with dyspnea:[MF1.1] He has productive cough, dyspnea and subtle RML infiltrate with a mild leukocytosis.  Given that[MF1.3] it sounds as though he had an aspiration episode while eating a couple of days ago[MF1.1] I would treat this as aspiration pneumonia.  Will treat with unasyn and ask for an SLP consultation.  Provide supplemental O2 as needed and await culture results.  For completeness and given his inability to provide a history will also check a urine culture given that he had a UTI in  January.[MF1.3]    2.   Unilateral conjunctivitis:[MF1.1] right eye.[MF1.3]  this does seem to be somewhat purulent and may be bacterial.  In addition to systemic antibiotics will continue polytrim as started in the ER.[MF1.1]  Monitor.[MF1.3]      3.   Dementia with behavioral issues: lives in memory care unit.  Has some behavioral issues at baseline.    -Continue pta medications including cymbalta, namenda, and seroquel[MF1.1] once verified.[MF1.3]  -give seroquel after meals instead of before so he is not too sedated for eating and decrease the risk of aspiration  -does better with seroquel prn for agitation and would prefer using this to haldol if able.  Will take meds crushed in pudding generally     4.[MF1.1]  Reported hx of[MF1.3] Prostate cancer with BPH: continue flomax     5.  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  Physical deconditioning: prior to admission was[MF1.1] often using a lift (provided at the time of his last admission here in January) but 1-2 months ago after his last admission had actually rallied to the point where he was moving fairly well with therapies at his memory care unit.  His daughter would like a trial of therapies here.  Will likely need to use a lift initially though.[MF1.3]     DVT Prophylaxis:[MF1.1] Pneumatic Compression Devices[MF1.3]  Code Status:[MF1.1] Full Code: I did discuss this at length with his daughter as I feel they should at least consider DNR/DNI status given his advanced dementia.  Family has been discussing this and will continue to do so.[MF1.3]  Discharge Dispo: admit to inpatient status      History of Present Illness:  Eder Pearson is a 83 year old male with PMH including anxiety, dementia who presents from his memory care facility with shortness of breath.  His daughter accompanied him to the ER and provides much of the history as the patient is disoriented.  He has been somewhat weaker than usual and cannot eat on his own  for the past few days which is a change for him.  It sounds as though he was coughing while eating a meal a couple of days ago and there was concern for aspiration. He has had some discharge from his right eye.  This morning he did have a fever and was coughing more[MF1.1] with some purulent sputum and also more creamy discharge from his right eye.    Per his daughter he actually did fairly well after being discharged from [facility] in January and did well with therapies at his [facility].  He does have a lift there but got to the point that he could walk with assistance following therapies.[MF1.3]       Past Medical History:  Past Medical History:   Diagnosis Date     Anxiety      Dementia      Prostate cancer (H)      Past Surgical History:  History reviewed. No pertinent surgical history.    Social History:[MF1.1] resides at [facility].  Accompanied by his daughter.  Also has a son involved in his life.[MF1.3]    Social History   Substance Use Topics     Smoking status: Never Smoker     Smokeless tobacco: Not on file     Alcohol use No       Family History:[MF1.1]  Reviewed and non-contributory in this case.[MF1.3]    Allergies:  No Known Allergies  Medications:[MF1.1]  Prior to Admission Medications   Prescriptions Last Dose Informant Patient Reported? Taking?   Cholecalciferol (VITAMIN D3 PO)   Yes No   Sig: Take 5,000 Units by mouth daily   Cyanocobalamin (VITAMIN B 12 PO)   Yes No   Sig: Take 1,000 mcg by mouth daily    DULoxetine (CYMBALTA) 30 MG EC capsule   Yes No   Sig: Take 30 mg by mouth daily   Memantine HCl (NAMENDA PO)   Yes No   Sig: Take 10 mg by mouth daily   Psyllium (NATURAL FIBER PO)   Yes No   Sig: Take 1 tsp. by mouth daily   QUETIAPINE FUMARATE PO   Yes No   Sig: Take 12.5 mg by mouth daily (with lunch)    QUEtiapine (SEROQUEL) 25 MG tablet   Yes No   Sig: Take 12.5 mg by mouth daily (with dinner)   QUEtiapine (SEROQUEL) 25 MG tablet   Yes No   Sig: Take 25 mg by mouth At Bedtime   QUEtiapine  (SEROQUEL) 25 MG tablet   Yes No   Sig: Take 25 mg by mouth every morning   QUEtiapine (SEROQUEL) 25 MG tablet   Yes No   Sig: Take 12.5 mg by mouth every 12 hours as needed   acetaminophen (TYLENOL) 500 MG tablet   Yes No   Sig: Take 1,000 mg by mouth 2 times daily   acetaminophen (TYLENOL) 500 MG tablet   Yes No   Sig: Take 1,000 mg by mouth daily as needed for mild pain   bisacodyl (DULCOLAX) 10 MG Suppository   Yes No   Sig: Place 10 mg rectally daily as needed for constipation   menthol-zinc oxide (CALMOSEPTINE) 0.44-20.625 % OINT ointment   Yes No   Sig: Apply topically every 8 hours as needed for skin protection   miconazole with skin protectant (LOPEZ ANTIFUNGAL) 2 % CREA cream   Yes No   Sig: Apply topically every 8 hours Apply to buttocks topically every shift for barrier cream with each toileting   phenylephrine-shark liver oil-mineral oil-petrolatum (PREPARATION H) 0.25-14-74.9 % rectal ointment   Yes No   Sig: Place rectally daily as needed for hemorrhoids   senna-docusate (SENOKOT-S;PERICOLACE) 8.6-50 MG per tablet   Yes No   Sig: Take 1 tablet by mouth every 12 hours as needed for constipation   sodium chloride (OCEAN) 0.65 % nasal spray   Yes No   Sig: Spray 2 sprays into both nostrils 2 times daily   sodium chloride (OCEAN) 0.65 % nasal spray   Yes No   Sig: Spray 2 sprays into both nostrils every 8 hours as needed for congestion   tamsulosin (FLOMAX) 0.4 MG capsule   Yes No   Sig: Take 0.4 mg by mouth At Bedtime      Facility-Administered Medications: None[MF1.4]         Review of Systems:  A Comprehensive greater than 10 system review of systems was carried out.  Pertinent positives and negatives are noted above.  Otherwise negative for contributory information.     Physical Exam:  Blood pressure 128/87, pulse 76, temperature 98.2  F (36.8  C), temperature source Oral, resp. rate 20, SpO2 90 %.  Wt Readings from Last 1 Encounters:   01/20/17 86 kg (189 lb 8 oz)     Exam:  General:[MF1.1] elderly  man, eyes closed, seems somewhat agitated, fidgeting in bed.  Non-toxic but looks chronically ill and fairly frail.[MF1.3]  HEENT: NC/AT,[MF1.1] right eye injected with obvious purulent discharge.[MF1.3]   external occular movements intact[MF1.1] but doesn't follow commands. otherwise f[MF1.3]ace symmetric.   Cardiac: RRR, S1, S2.  No murmurs appreciated.  Pulmonary: Normal chest rise, normal work of breathing.  Lungs CTA BL  Abdomen: soft, non-tender, non-distended.  Bowel Sounds Present.  No guarding.  Extremities:[MF1.1] no edema.[MF1.3]  no deformities.  Warm, well perfused.  Skin: no rashes or lesions noted.  Warm and Dry.  Neuro:[MF1.1] somewhat agitated, doesn't follow commands, not participating, moans, makes non-sensical sounds periodically.  No clonus or tremor or seizure activity.[MF1.3].  Psych: Appropriate affect.    Data:  EKG:[MF1.1]  Sinus rhythm with RBBB (not new).[MF1.5]    Imaging:  Results for orders placed or performed during the hospital encounter of 05/05/17   XR Chest 1 View    Narrative    XR CHEST 1 VW 5/5/2017 3:21 PM    HISTORY: fever, cough      Impression    IMPRESSION: Shallow inspiration with some minimal atelectasis right  lung base. I cannot exclude a nodular density in the right midlung not  seen on the previous exam dated 1/16/2017.    WIL CHARLES MD     Labs:[MF1.1]    Recent Labs  Lab 05/05/17  1358   WBC 11.7*   HGB 9.9*   HCT 31.1*      [MF1.6]          Lab Results   Component Value Date     05/05/2017     01/19/2017     01/18/2017    Lab Results   Component Value Date    CHLORIDE 106 05/05/2017    CHLORIDE 110 01/19/2017    CHLORIDE 109 01/18/2017    Lab Results   Component Value Date    BUN 24 05/05/2017    BUN 16 01/19/2017    BUN 17 01/18/2017      Lab Results   Component Value Date    POTASSIUM 4.2 05/05/2017    POTASSIUM 3.9 01/20/2017    POTASSIUM 3.5 01/19/2017    Lab Results   Component Value Date    CO2 30 05/05/2017    CO2 27  01/19/2017    CO2 26 01/18/2017    Lab Results   Component Value Date    CR 1.11 05/05/2017    CR 1.06 01/21/2017    CR 1.00 01/20/2017            Sadi Anna MD  Hospitalist  [facility][MF1.1]             Revision History        User Key Date/Time User Provider Type Action    > MF1.2 5/5/2017  4:25 PM Cortes Anna MD Physician Addend     MF1.4 5/5/2017  4:23 PM Cortes Anna MD Physician Sign     MF1.3 5/5/2017  4:16 PM Cortes Anna MD Physician      MF1.5 5/5/2017  3:55 PM Cortes Anna MD Physician      MF1.6 5/5/2017  3:47 PM Cortes Anna MD Physician      MF1.1 5/5/2017  3:46 PM Cortes Anna MD Physician                      Discharge Summaries      Discharge Summaries by Pacheco Prince MD at 5/13/2017 11:09 AM     Author:  Pacheco Prince MD Service:  Hospitalist Author Type:  Physician    Filed:  5/13/2017 11:09 AM Date of Service:  5/13/2017 11:09 AM Note Created:  5/13/2017 10:59 AM    Status:  Signed :  Pacheco Prince MD (Physician)         [facility] Discharge Summary    Eder Pearson MRN# [id number]   Age: 83 year old YOB: 1933     Date of Admission:  5/5/2017  Date of Discharge::  5/13/2017  Admitting Physician:  Cortes Anna MD  Discharge Physician:  Pacheco Prince MD     Home clinic: [facility]          Admission Diagnoses:[KP1.1]   Delirium [R41.0]  PNA (pneumonia) [J18.9]  Acute bacterial conjunctivitis of right eye [H10.31][KP1.2]          Discharge Diagnosis:[KP1.1]   Principal Problem:    Aspiration pneumonitis (H)  Active Problems:    Acute respiratory failure with hypoxia (H)    Late onset Alzheimer's disease with behavioral disturbance    Acute bacterial conjunctivitis of right eye    Infectious encephalopathy[KP1.2]            Procedures:   CXR          Medications Prior to Admission:[KP1.1]     Prescriptions Prior  to Admission   Medication Sig Dispense Refill Last Dose     acetaminophen (TYLENOL) 500 MG tablet Take 1,000 mg by mouth 2 times daily , at 0700 & 1700   5/5/2017 at 0700     Cyanocobalamin (VITAMIN B 12 PO) Take 1,000 mcg by mouth daily    5/5/2017 at 0800     Cholecalciferol (VITAMIN D3 PO) Take 2,000 Units by mouth daily    5/5/2017 at 0900     Memantine HCl (NAMENDA PO) Take 10 mg by mouth daily   5/5/2017 at 0900     tamsulosin (FLOMAX) 0.4 MG capsule Take 0.4 mg by mouth At Bedtime   5/4/2017 at 2000     DULoxetine (CYMBALTA) 30 MG EC capsule Take 30 mg by mouth daily   5/4/2017 at 2000     Psyllium (NATURAL FIBER PO) Take 1 tsp. by mouth daily   5/5/2017 at 0900     QUEtiapine (SEROQUEL) 25 MG tablet Take 25 mg by mouth At Bedtime   5/4/2017 at 2000     sodium chloride (OCEAN) 0.65 % nasal spray Spray 2 sprays into both nostrils 2 times daily , at 0900 & 2100   5/5/2017 at 0900     miconazole with skin protectant (LOPEZ ANTIFUNGAL) 2 % CREA cream Apply topically every 8 hours Apply to buttocks topically every shift for barrier cream with each toileting   am     QUEtiapine (SEROQUEL) 25 MG tablet Take 12.5 mg by mouth every 12 hours as needed   prn     sodium chloride (OCEAN) 0.65 % nasal spray Spray 2 sprays into both nostrils every 8 hours as needed for congestion   prn     senna-docusate (SENOKOT-S;PERICOLACE) 8.6-50 MG per tablet Take 1 tablet by mouth every 12 hours as needed for constipation   prn     acetaminophen (TYLENOL) 500 MG tablet Take 1,000 mg by mouth daily as needed for mild pain   prn     phenylephrine-shark liver oil-mineral oil-petrolatum (PREPARATION H) 0.25-14-74.9 % rectal ointment Place rectally daily as needed for hemorrhoids   prn     menthol-zinc oxide (CALMOSEPTINE) 0.44-20.625 % OINT ointment Apply topically every 8 hours as needed for skin protection   prn     [DISCONTINUED] QUEtiapine (SEROQUEL) 25 MG tablet Take 12.5 mg by mouth daily (with dinner)   5/4/2017 at 1800      [DISCONTINUED] QUEtiapine (SEROQUEL) 25 MG tablet Take 25 mg by mouth every morning   5/5/2017 at 0800     bisacodyl (DULCOLAX) 10 MG Suppository Place 10 mg rectally daily as needed for constipation   prn     [DISCONTINUED] QUETIAPINE FUMARATE PO Take 12.5 mg by mouth daily (with lunch)    5/4/2017 at 1300[KP1.2]             Discharge Medications:[KP1.1]     Current Discharge Medication List      CONTINUE these medications which have NOT CHANGED    Details   !! acetaminophen (TYLENOL) 500 MG tablet Take 1,000 mg by mouth 2 times daily , at 0700 & 1700      Cyanocobalamin (VITAMIN B 12 PO) Take 1,000 mcg by mouth daily       Cholecalciferol (VITAMIN D3 PO) Take 2,000 Units by mouth daily       Memantine HCl (NAMENDA PO) Take 10 mg by mouth daily      tamsulosin (FLOMAX) 0.4 MG capsule Take 0.4 mg by mouth At Bedtime      DULoxetine (CYMBALTA) 30 MG EC capsule Take 30 mg by mouth daily      Psyllium (NATURAL FIBER PO) Take 1 tsp. by mouth daily      !! QUEtiapine (SEROQUEL) 25 MG tablet Take 25 mg by mouth At Bedtime      !! sodium chloride (OCEAN) 0.65 % nasal spray Spray 2 sprays into both nostrils 2 times daily , at 0900 & 2100      miconazole with skin protectant (LOPEZ ANTIFUNGAL) 2 % CREA cream Apply topically every 8 hours Apply to buttocks topically every shift for barrier cream with each toileting      !! QUEtiapine (SEROQUEL) 25 MG tablet Take 12.5 mg by mouth every 12 hours as needed      !! sodium chloride (OCEAN) 0.65 % nasal spray Spray 2 sprays into both nostrils every 8 hours as needed for congestion      senna-docusate (SENOKOT-S;PERICOLACE) 8.6-50 MG per tablet Take 1 tablet by mouth every 12 hours as needed for constipation      !! acetaminophen (TYLENOL) 500 MG tablet Take 1,000 mg by mouth daily as needed for mild pain      phenylephrine-shark liver oil-mineral oil-petrolatum (PREPARATION H) 0.25-14-74.9 % rectal ointment Place rectally daily as needed for hemorrhoids      menthol-zinc oxide  (CALMOSEPTINE) 0.44-20.625 % OINT ointment Apply topically every 8 hours as needed for skin protection      bisacodyl (DULCOLAX) 10 MG Suppository Place 10 mg rectally daily as needed for constipation       !! - Potential duplicate medications found. Please discuss with provider.[KP1.2]                Consultations:   No consultations were requested during this admission          Hospital Course:   83 year old male with PMH most significant for advanced dementia who presented from his memory care facility on 5/5/17 with shortness of breath and increased lethargy. He was found to have fever, leukocytosis and subtle RML infiltrate that was suspected to have been due to aspiration. He was admitted for treatment of aspiration pneumonia and incidentally right eye conjunctivitis.      During course of hospital stay, patient's lethargy improved, and was to discontinue supplemental oxygen requirements. Had episode of fever on 5/9, but none before or since. He clearly coughs with thin liquids and daughter indicates that she has been encouraging him to take in more fluids despite this. SLP confirms the presence of aspiration. SLP did see the patient to guide treatment.     Overall, though the patient appeared to have much less cough and supplemental O2 needs as of 5/11. His VS, exam, WBC and procalcitonin were strongly consistent with the absence of significant infection. For that reason, the ABX were stopped in favor of monitoring the patient off meds.     In the past 48 hours, since discontinuation of the antibiotics, Mr. Pearson has remained stable from the point of view of oxygen need and fevers. He is interacting with family at about his baseline. His Seroquel dosing has been significantly reduced and this is reflected in the changes noted in Discharge Medications.     At the time of discharge, Mr. Pearson is somnolent but able to waken for family. He is confused at baseline.[KP1.1]   /56  Pulse 65  Temp 96.3  F  "(35.7  C) (Axillary)  Resp 18  Ht 1.829 m (6' 0.01\")  Wt 86.2 kg (190 lb 1.6 oz)  SpO2 90%  BMI 25.78 kg/m2[KP1.3]  Chest: CTA with symmetric   COR: RRR without murmur  Abd: soft, not apparently tender.   Extrem: soft, pitting edema bilat feet (PCDs currently in place)         Discharge Instructions and Follow-Up:   Discharge diet: Soft mechanical with nectar-thickened liquids.   Discharge activity: Resume usual activity which recently has been pivot with assist   Discharge follow-up: Follow up with the NH physician per routine.           Discharge Disposition:   Discharged to long-term care facility      Attestation:  I have reviewed today's vital signs, notes, medications, labs and imaging.  Total time: 25 minutes    Pacheco Prince MD[KP1.1]        Revision History        User Key Date/Time User Provider Type Action    > KP1.3 5/13/2017 11:09 AM Pacheco Prince MD Physician Sign     KP1.2 5/13/2017 11:00 AM Pacheco Prince MD Physician      KP1.1 5/13/2017 10:59 AM Pacheco Prince MD Physician                      Consult Notes      Consults by Rosalia Reilly LSW at 5/13/2017  1:02 PM     Author:  Rosalia Reilly LSW Service:  (none) Author Type:      Filed:  5/13/2017  1:02 PM Date of Service:  5/13/2017  1:02 PM Note Created:  5/13/2017 12:57 PM    Status:  Signed :  Rosalia Reilly LSW ()     Consult Orders:    1. Social Work IP Consult [[id number]] ordered by Pacheco Prince MD at 05/12/17 1718                ..Care Transition Initial Assessment -   Reason For Consult: care coordination/care conference, discharge planning  Met with Nuvia Gonzalez and pt to discuss discharge plan. Pt is ready to dc back to [facility]. Family would like to set up medical transportation and is aware of the cost.     SW called [facility] and informed them that pt is dc today at 4:30pm. SW set up transportation wheelchair transport via [facility] at 4:30pm.      Principal Problem:    " Aspiration pneumonitis (H)  Active Problems:    Acute respiratory failure with hypoxia (H)    Late onset Alzheimer's disease with behavioral disturbance    Acute bacterial conjunctivitis of right eye    Infectious encephalopathy         DATA  Lives With: facility resident  Living Arrangements: residential facility ([facility] )  Identified issues/concerns regarding health management: N/A           PLAN  Financial costs for the patient includes: None  Patient given options and choices for discharge: Yes  Patient/family is agreeable to the plan?  YES  Patient Goals and Preferences: Dc back to [facility]  Patient anticipates discharging to: Dc back to [facility][ME1.1]       Revision History        User Key Date/Time User Provider Type Action    > ME1.1 5/13/2017  1:02 PM Rosalia Reilly, DELMARW  Sign            Consults by Mar Salazar RN at 5/9/2017 11:55 AM     Author:  Mar Salazar RN Service:  (none) Author Type:      Filed:  5/10/2017 11:42 AM Date of Service:  5/9/2017 11:55 AM Note Created:  5/9/2017 11:40 AM    Status:  Addendum :  Mar Salazar, RN ()     Consult Orders:    1. Care Coordinator IP Consult [[id number]] ordered by Cortes Anna MD at 05/09/17 1133     2. Social Work IP Consult [[id number]] ordered by Awilda Uribe MD at 05/08/17 1450                Care Transitions Team: Following for CC, discharge planning, and disposition.        Per chart review pt. has admission from January 2017 for PNA + UTI, current admission, Asp. PNA.   Palli consulted for goals of care.     Per chart review pt is resident at [facility] where all services are provided for him.[ED1.1]   KODY RN IS CONTACT AT [facility]--[phone][ED1.2]    DaughterLisa explains that staff at [facility] have been using a lift to transfer him and he has a wheel chair.  At baseline he goes to meals  "in dinning room, spends a lot of time sleeping as of last month.    Lisa explains that in January \"he seemed to bounce back rather quickly from his illness, he had physical therapy and actually started walking some, just seemed more up beat\"  Lisa goes on to explain \"when that was over he started to decline again.\" \"Just less interactive and sleeping more\"     Lisa expresses understanding that she may be witnessing a general failure to thrive and has had hospice in back of mind \"of course\" just not quite ready to \"let go\".  Lisa explains that current goal will be to [facility] with SLP and PT to \"see how he does\"  If he is declining and not wanting to participate or eat \"then I guess then we will need to revisit the Hospice\"  Lisa identifies these services and contacts for these services provided through [facility].     Lisa requests HE to be arranged for transportation when pt is medically stable for discharge.  Pt does not have 02 at [facility].    Will continue to follow and be  In contact with dtr Lisa and Jose Sanabria for coordination of return when medically stable.     Mar Salazar RN BSN CTS  Care Transitions Team  [phone][ED1.1]       Revision History        User Key Date/Time User Provider Type Action    > ED1.2 5/10/2017 11:42 AM Mar Salazar RN Case Manager Addend     ED1.1 5/9/2017 11:55 AM Mar Salazar RN Case Manager Sign            Consults by Jessica Macias APRN CNS at 5/8/2017  5:05 PM     Author:  Jessica Macias APRN CNS Service:  Palliative Author Type:  Clinical Nurse Specialist    Filed:  5/8/2017 10:16 PM Date of Service:  5/8/2017  5:05 PM Note Created:  5/8/2017  4:48 PM    Status:  Signed :  Jessica Macias APRN CNS (Clinical Nurse Specialist)         [facility]    Palliative Care Consultation   Text Page    Date of Admission:  5/5/2017[JW1.1]    Assessment & Plan[JW1.2]   Eder Pearson is a 83 year old male who was " admitted on 5/5/2017. I was asked to see the patient and family for goals of care.    Recommendations:  1.[JW1.1] Delirium - Seroquel as per Hospitalist team. Appreciate nursing help to achieve routine with cares and activities, use sensory aides, promote sleep at night and awake during the day. Cymbalta and Namenda restarted 5/6 by Hospitalists.[JW1.3]  2.[JW1.1] Dyspnea - Promote good pulmonary toilet, position for comfort, breathing,  oxygen as previously ordered, offer fan prn. Position for optimal safety and alertness with eating. Will hold on opioid for dyspnea prn for now.[JW1.3]    Goal of Care:[JW1.1] Full code. Have requested dtr Lisa to bring in copy of pt HCD. Reviewed POLST from [facility] [place] that is identical to POLST on file for [facility] from 1/2017. Will continue conversation with Dtr Lisa and pt's wife Nuvia 5/9 afternoon and give them information on CPR, intubation/respiratory support and options for levels of interventions for pt.[JW1.3]    Disease Process/es & Symptoms:  Eder Pearson is a 83 year old patient admitted with symptoms of shortness of breath with fever[JW1.1] and leukocytosis[JW1.3]. He has been treated for aspiration PNA and confusion.      This is in the setting of anxiety, dementia, prostate cancer and chronic kidney disease.  He has been hospitalized[JW1.1] Jan, 2017 for pneumonia and UTI, and now with aspiration PNA[JW1.3]. There is a documented unitentional weight loss of 25 pounds over the past 12 months.    The following symptoms are noted by[JW1.1] family[JW1.3] as concerning to[JW1.1] pt's[JW1.3] quality of life.[JW1.1]  Dyspnea - improving with treatment for PNA.  Drowsiness - attributed to seroquel, pneumonia illness, changes to daily routines.  Deconditioning - dtr Lisa reports that pt was limited in his ability to rehab at one point [facility], but in the last month had been walking and improving some of his physical strength with  "PT.[JW1.3]    Psychosocial/Spiritual Needs:[JW1.1]    Oriented to Spiritual Health and Social Work Services as part of Palliative Care team. SWS is following pt.[JW1.3]    Decision-Making & Goals of Care:  Discussion/counseling today about goals of care/decisions:[JW1.1]   5/8/17 Met with dtr Lisa and wife Nuvia in pt room. They retell pt history with dementia and most recent hospitalizations. They believe pt has quality of life when he as at baseline because of his ability to interact with his 3 year old grandson when Loyd, pt's  son, comes with his family from [place] every 4-6 weeks. They are hopeful that pt can recover to his baseline and return to [facility] care. Lisa states that this hospitalization for PNA was \"easier\" than the last one in January. They visit pt at [facility] every day, and dtr Lisa helps him with eating, physical activity. Lisa acknowledged that they are aware of their dad's decline over time. Lisa verbalizes that she sees pts at the [facility] that do not talk, do not have visitors, do not have any interaction with others, they are just taken care of and fed, day after day. She does not want pt to be like that when the time comes, but prior to this admission, her dad was improving in his physical strength. She, her mom and her siblings are talking about code status and care, and she can repeat to me the recommendations that the Hospitalists have told her about code status vs. Supportive or comfort care. \"That is a big change and we would want to call 911 and bring him back to the hospital if his breathing got bad or he was in distress\". Lisa states that she has reviewed information about cpr on the intranet, but was ok with me giving her and her mom information to review about cpr, respiratory support and decision-making. Lisa and Nuvia agreed to discuss further and review POLST tomorrow. Lisa will bring in a copy of pt's HCD.    [place][JW1.3]ent has decision-making capacity[JW1.1] " Unreliable[JW1.3]  Patient has[JW1.1] a Health Care Agent named in a legal Advance Directive document that dtr Lisa will bring. Will add name(s) and contact information in Health Care Agent/Legal Guardian section below. Until then, next of kin is pt's spouse Nuvia, who lives with pt's dtr Lisa.[JW1.3]  Name:[JW1.1] Nuvia Pearson[JW1.3], Relationship:[JW1.1] spouse[JW1.3], Phone(s):.[JW1.1][phone][JW1.3]      Patient has a completed health care directive available in the chart (Y/N):[JW1.1] N - have requested that dtr Lisa bring a copy.  Physician orders for life-sustaining treatment (POLST) form is on file[JW1.3]  Code Status:[JW1.1] Full code[JW1.3]     Findings & plan of care discussed with:[JW1.1] Lisa, dtr, and Nuvia, wife. Will follow up with Dr. Uribe and LENORA Huitron in am.[JW1.3]  Follow-up plan from palliative team:[JW1.1] Will continue to follow this pt and family for symptom management and goals of care support.[JW1.3]  Thank you for involving us in the patient's care.[JW1.1]     Jessica Macias[JW1.2] APRN, CNS[JW1.3]  Pain Management and Palliative Care  [facility]  Pgr: [phone][JW1.1]    Time Spent on this Encounter[JW1.2]   I spent[JW1.1] 60[JW1.3] minutes in assessment of the patient and discussion with the patient and family. Another[JW1.1] 30[JW1.3] minutes in review of chart, documentation and discussion with the health care team.[JW1.1]    Reason for Consult[JW1.2]   Reason for consult: I was asked by[JW1.1] Dr. Uribe[JW1.3]  to evaluate this patient for[JW1.1] Goals of care[JW1.3].[JW1.1]    Primary Care Physician   [facility]    Chief Complaint[JW1.2]    Shortness of breath, fever    History is obtained from the electronic health record, patient's daughter and patient's spouse[JW1.3]    History of Present Illness[JW1.2]   Eder Pearson is a 83 year old male who presents with[JW1.1] SOB, fever, and a reddened, irritated R eye. He was diagnosed in  "the [facility] ED with aspiration pneumonia and conjunctivitis of his R eye. He has increased confusion during his hospitalization which had improved yesterday, to the point that he was able to talk with his son and family in [place] on the phone, but today he is more sedated, and having difficulty opening his eyes or interacting with me. Nursing reports that he has been incont of bowel and bladder.    Family reports that pt was hospitalized in January, 2017 with PNA, but attributed it to influenza, not aspiration, which others in their family and at his Memory Care had at that time. His daughter states that pt was \"really sick\" with the last PNA in January, but this one for aspiration PNA has not been as bad. Pt had been limited in his ability to be physically active based on rehab rules at his facility and equipment, but in the last month had been up walking with a walker, able to feed himself at meal time, and able to interact with family members, especially his 3 year old grandson from [place]. Dtr reports that she and spouse's wife visit pt daily and help him with ADLs i.e. eat, ambulate. He has had dementia for approximately 3 years with progressive cognitive decline. They describe pt with language skills that they describe as fluent, but not relevant to the conversation they might be having with him. They state that he mumbles more and more. He has periods of violence and has been on several medication regimens to control his behaviors and level of sedation. He has done fairly well with current regimen of cymbalta, seroquel and namenda per his neurologist. He has a hx of falls and was hospitalized last for fall in 12/16.[JW1.3]    Past Medical History[JW1.2]   I have reviewed this patient's medical history and updated it with pertinent information if needed.[JW1.1]   Past Medical History:   Diagnosis Date     Anxiety      Dementia      Prostate cancer (H)[JW1.4]        Past Surgical History[JW1.2]   I have " reviewed this patient's surgical history and updated it with pertinent information if needed.[JW1.1]  History reviewed. No pertinent surgical history.[JW1.4]    Prior to Admission Medications   Prior to Admission Medications   Prescriptions Last Dose Informant Patient Reported? Taking?   Cholecalciferol (VITAMIN D3 PO) 5/5/2017 at 0900  Yes Yes   Sig: Take 2,000 Units by mouth daily    Cyanocobalamin (VITAMIN B 12 PO) 5/5/2017 at 0800  Yes Yes   Sig: Take 1,000 mcg by mouth daily    DULoxetine (CYMBALTA) 30 MG EC capsule 5/4/2017 at 2000  Yes No   Sig: Take 30 mg by mouth daily   Memantine HCl (NAMENDA PO) 5/5/2017 at 0900  Yes Yes   Sig: Take 10 mg by mouth daily   Psyllium (NATURAL FIBER PO) 5/5/2017 at 0900  Yes No   Sig: Take 1 tsp. by mouth daily   QUETIAPINE FUMARATE PO 5/4/2017 at 1300  Yes No   Sig: Take 12.5 mg by mouth daily (with lunch)    QUEtiapine (SEROQUEL) 25 MG tablet 5/4/2017 at 1800  Yes No   Sig: Take 12.5 mg by mouth daily (with dinner)   QUEtiapine (SEROQUEL) 25 MG tablet 5/4/2017 at 2000  Yes No   Sig: Take 25 mg by mouth At Bedtime   QUEtiapine (SEROQUEL) 25 MG tablet 5/5/2017 at 0800  Yes No   Sig: Take 25 mg by mouth every morning   QUEtiapine (SEROQUEL) 25 MG tablet prn  Yes No   Sig: Take 12.5 mg by mouth every 12 hours as needed   acetaminophen (TYLENOL) 500 MG tablet 5/5/2017 at 0700  Yes Yes   Sig: Take 1,000 mg by mouth 2 times daily , at 0700 & 1700   acetaminophen (TYLENOL) 500 MG tablet prn  Yes No   Sig: Take 1,000 mg by mouth daily as needed for mild pain   bisacodyl (DULCOLAX) 10 MG Suppository prn  Yes No   Sig: Place 10 mg rectally daily as needed for constipation   menthol-zinc oxide (CALMOSEPTINE) 0.44-20.625 % OINT ointment prn  Yes No   Sig: Apply topically every 8 hours as needed for skin protection   miconazole with skin protectant (LOPEZ ANTIFUNGAL) 2 % CREA cream am  Yes No   Sig: Apply topically every 8 hours Apply to buttocks topically every shift for barrier cream  with each toileting   phenylephrine-shark liver oil-mineral oil-petrolatum (PREPARATION H) 0.25-14-74.9 % rectal ointment prn  Yes No   Sig: Place rectally daily as needed for hemorrhoids   senna-docusate (SENOKOT-S;PERICOLACE) 8.6-50 MG per tablet prn  Yes No   Sig: Take 1 tablet by mouth every 12 hours as needed for constipation   sodium chloride (OCEAN) 0.65 % nasal spray 5/5/2017 at 0900  Yes No   Sig: Spray 2 sprays into both nostrils 2 times daily , at 0900 & 2100   sodium chloride (OCEAN) 0.65 % nasal spray prn  Yes No   Sig: Spray 2 sprays into both nostrils every 8 hours as needed for congestion   tamsulosin (FLOMAX) 0.4 MG capsule 5/4/2017 at 2000  Yes No   Sig: Take 0.4 mg by mouth At Bedtime      Facility-Administered Medications: None     Allergies   No Known Allergies    Social History[JW1.2]   I have updated and reviewed the following Social History Narrative:[JW1.1]   Social History     Social History Narrative[JW1.2]     Living situation:[JW1.1] Pt lives at [facility].[JW1.3]  Family system:[JW1.1] Pt is  to wife Nuvia. He has 3 grown children Sarah, Loyd and his family that live in [place]. Lisa lives here and her mother lives with her. Pt and wife lost youngest son 20+ years ago. Pt and wife had been retired and living in a house in [place] for approx 10 years.[JW1.3]   Functional status (needs help with ADLs or IADLs):[JW1.1]Pt requires assist with all ADLs. He transfers currently with assist of 2.[JW1.3]   Employment/education:[JW1.1] Retired.[JW1.3]  Use of community resources:[JW1.1] Lives in memory care.[JW1.3]  Activities/interests:[JW1.1] Grandchild and family. Going places with family, but has not been able to do this as it is too difficult to get him and out of cars.[JW1.3]  History of substance use/abuse:[JW1.1] None identified.[JW1.3]  Yazidi affiliation:[JW1.1] Not assessed[JW1.3]  Involvement in [place] community:[JW1.1] Not assessed.[JW1.3]  Impact of  illness on patient:[JW1.1] Pt is weaker and dependent on nursing for cares. His mentation waxes and wanes/[JW1.3]    Family History[JW1.2]   I have reviewed this patient's family history and updated it with pertinent information if needed.[JW1.1]   No family history on file.[JW1.5]    Review of Systems[JW1.2]   C: POSITIVE  for fever -improving, chills, weight lost of 25# since May, 2016.  E/M: NEGATIVE for ear, mouth and throat problems  R: NEGATIVE for significant cough or SOB  CV: NEGATIVE for chest pain, palpitations or peripheral edema  GI: NEGATIVE for constipation    P[JW1.3]alliative Symptom Review (0=no symptom/no concern, 1=mild, 2=moderate, 3=severe):      Pain:[JW1.1] 0-none[JW1.3]      Fatigue:[JW1.1] 0-none[JW1.3]      Nausea:[JW1.1] 0-none[JW1.3]      Constipation:[JW1.1] 0-none[JW1.3]      Diarrhea:[JW1.1] 1-mild[JW1.3]      Depressive Symptoms:[JW1.1] 0-none[JW1.3]      Anxiety:[JW1.1] 0-none[JW1.3]      Drowsiness:[JW1.1] 2-moderate[JW1.3]      Poor Appetite:[JW1.1] 1-mild[JW1.3]      Shortness of Breath:[JW1.1] 0-none[JW1.3]      Insomnia:[JW1.1] 0-none[JW1.3]      Other:[JW1.1] Agitation  2-moderate[JW1.3]      Overall (0 good/no concerns, 3 very poor):[JW1.1]  2[JW1.3]    Physical Exam[JW1.2]   Temp:  [96.6  F (35.9  C)-99.9  F (37.7  C)] 99.1  F (37.3  C)  Pulse:  [65-69] 66  Resp:  [18] 18  BP: (140-162)/(59-73) 162/73  SpO2:  [88 %-92 %] 92 %[JW1.6]  190 lbs 1.6 oz[JW1.2]  GEN:[JW1.1]  Drowsy[JW1.3],[JW1.1] dis[JW1.3]oriented[JW1.1] to place and time[JW1.3], appears[JW1.1] to recognize family but is incorrect or unable to name them. Appears[JW1.3] comfortable, NAD.  HEENT:  Normocephalic/atraumatic, no scleral icterus, no nasal discharge, mouth moist.  CV:[JW1.1] Irreg[JW1.3], S1, S2; no murmurs noted.  +3 DP/PT pulses bilatererally; no edema BLE.  RESP:[JW1.1]  A[JW1.3]uscultation bilaterally[JW1.1] anteriorly[JW1.3] with rales[JW1.1], without r[JW1.3]honchi/wheezing/retractions.  Symmetric  chest rise on inhalation noted.  Normal respiratory effort.  ABD:  Rounded, soft, non-tender/non-distended.  +BS  EXT:  Edema & pulses as noted above.  CMS intact x 4.     M/S:[JW1.1]   Non-[JW1.3]Tender to palpation.    SKIN:  Dry to touch, no exanthems noted in the visualized areas.    NEURO: Symmetric strength +5/5.  Sensation to touch intact all extremities.   There is no area of allodynia or hyperesthesia.  Psych:  Calm,[JW1.1] un[JW1.3]cooperative, conversant[JW1.1] in[JW1.3]appropriately[JW1.1] and dissociated[JW1.3].[JW1.1] Unable to open eyes to command, but will open eye spontaneously, or with noxious stimuli, appears to be feeding himself while I visit with his wife and dtr, but he has no food or utensils.[JW1.3]     Delirium Screen/CAM:  Delirium = (#1 and #2 = YES) + (#3 and/or #4)   1) Acute onset and fluctuating course:[JW1.1]   YES[JW1.3]   (acute change in mental status from baseline over last 24 hours)  2) Inattention:[JW1.1]   YES[JW1.3]   (difficulty focusing, distractible, can't follow conversation)  3) Disorganized thinking:[JW1.1]   YES[JW1.3]   (score only if #1 and #2 are YES)  (rambling/irrelevant conversation, unclear/illogical thoughts, inconsistency)  4) Altered level of consciousness:[JW1.1]   YES[JW1.3]   (score only if #1 and #2 are YES)  (other than alert, calm, cooperative)    Delirium/CAM score:[JW1.1] 4[JW1.3]/4  Interpretation:  1)  Delirium:[JW1.1]  Present[JW1.3]  2)  Type:[JW1.1]  mixed[JW1.3]  3)  Severity:[JW1.1]  moderate[JW1.3]    Data   Results for orders placed or performed during the hospital encounter of 05/05/17 (from the past 24 hour(s))   Magnesium   Result Value Ref Range    Magnesium 2.0 1.6 - 2.3 mg/dL[JW1.2]     Exam Date Exam Time Accession # Performing Department Results       5/5/17  2:54 PM [id number] [facility]        [facility] Interactive Report and InfoRx      View the interactive report       PACS Images      Show images for XR  Chest 1 View       Study Result      XR CHEST 1 VW 5/5/2017 3:21 PM     HISTORY: fever, cough         IMPRESSION: Shallow inspiration with some minimal atelectasis right  lung base. I cannot exclude a nodular density in the right midlung not  seen on the previous exam dated 1/16/2017.     WIL CHARLES MD         05-MAY-2017 13:55:03 [facility]-R-ED ROUTINE RECORD  Sinus rhythm  Left axis deviation  Right bundle branch block  Abnormal ECG  When compared with ECG of 16-JAN-2017 22:19,  Premature atrial complexes are no longer Present[JW1.3]     Revision History        User Key Date/Time User Provider Type Action    > JW1.6 5/8/2017 10:16 PM Jessica Macias APRN CNS Clinical Nurse Specialist Sign     JW1.3 5/8/2017  8:32 PM Jessica Macias APRN CNS Clinical Nurse Specialist      JW1.5 5/8/2017  5:04 PM Jessica Macias APRN CNS Clinical Nurse Specialist      JW1.4 5/8/2017  5:03 PM Jessica Macias APRN CNS Clinical Nurse Specialist      JW1.2 5/8/2017  4:49 PM Jessica Macias APRN CNS Clinical Nurse Specialist      JW1.1 5/8/2017  4:48 PM Jessica Macias APRN CNS Clinical Nurse Specialist             Consults by Lauren Carrasquillo RD at 5/6/2017  9:26 AM     Author:  Lauren Carrasquillo RD Service:  Nutrition Author Type:  Registered Dietitian    Filed:  5/6/2017 12:36 PM Date of Service:  5/6/2017  9:26 AM Note Created:  5/6/2017  9:15 AM    Status:  Signed :  Lauren Carrasquillo RD (Registered Dietitian)         CLINICAL NUTRITION SERVICES  -  ASSESSMENT NOTE      Recommendations Ordered by Registered Dietitian (RD):[KJ1.1]   -Regular diet with textures / liquids per SLP  -Magic shakes with meals TID  -MVI+mineral  -Room service not appropriate[KJ1.2]          REASON FOR ASSESSMENT  Eder Pearson is a 83 year old male seen by Registered Dietitian for Admission Nutrition Risk Screen - Reduced oral intake over the last month      NUTRITION HISTORY  - Information  "obtained from[KJ1.1] chart review[KJ1.2]   - Patient is on a[KJ1.1] regular[KJ1.2] diet at home (memory care)[KJ1.1]. Noted that patient does not like chicken, kid's cereal or talapia[KJ1.2]  - Patient with increasing weakness recently with inability to eat on his own over the past few days. 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  Weight Status:  Overweight BMI 25-29.9  IBW: 80.91 kg   % IBW: 107%  Weight History: The data below suggests that patient's weight has been stable over the past four months.[KJ1.1]   Wt Readings from Last 10 Encounters:   05/05/17 86.2 kg (190 lb 1.6 oz)   01/20/17 86 kg (189 lb 8 oz)   01/27/15 108.9 kg (240 lb)[KJ1.3]   ]    LABS  Labs reviewed    MEDICATIONS  Medications reviewed    Dosing Weight: 86.2 kg (admit weight)    ASSESSED NUTRITION NEEDS (PER APPROVED PRACTICE GUIDELINES):  Estimated Energy Needs: 0192-8665 kcals (20-25 Kcal/Kg)  Justification: maintenance / limited activity   Estimated Protein Needs:  grams protein (1-1.2 g pro/Kg)  Justification: maintenance  Estimated Fluid Needs: 8409-7284  mL (1 mL/Kcal)  Justification: " "maintenance    MALNUTRITION:  % Weight Loss:  None noted  % Intake:[KJ1.1]  Unable to assess[KJ1.2]   Subcutaneous Fat Loss:[KJ1.1]  Unable to assess[KJ1.2]  Muscle Loss:[KJ1.1]  Unable to assess[KJ1.2]   Fluid Retention:  None noted    Malnutrition Diagnosis:[KJ1.1] Unable to determine due to insufficient information[KJ1.2]    NUTRITION DIAGNOSIS:[KJ1.1]  Predicted suboptimal nutrient intake (protein-energy)[KJ1.2] related to[KJ1.1] swallowing difficulties secondary to dysphagia and weakness[KJ1.2]     NUTRITION INTERVENTIONS  Recommendations / Nutrition Prescription[KJ1.1]  1. Regular diet with textures / liquids per SLP  2. Nutritional supplements with meals TID  3. Room service not appropriate  4. MVI+mineral[KJ1.2]     Implementation  Nutrition education:[KJ1.1] Not appropriate at this time due to patient condition  Medical Food Supplement - Ordered magic shakes with meals TID     Multivitamin/Mineral - Ordered MVI+mineral     Collaboration and referral of nutrition care - Made patient \"room service not appropriate\"[KJ1.2]      Nutrition Goals[KJ1.1]  Pt to consistently consume >/=50% of meals TID + 2 supplements/day[KJ1.2]      MONITORING AND EVALUATION:[KJ1.1]  Diet Order / Food intake and Liquid meal replacement or supplement - Ability to advance diet per SLP and respective intake (% of meals, supplements)    Lauren Carrasquillo RD, LD  Clinical Dietitian  3rd Floor/ICU Pager: [phone]  All Other Floors Pager: [phone]  Weekend/Holiday Pager: [phone][KJ1.2]                   Revision History        User Key Date/Time User Provider Type Action    > KJ1.2 5/6/2017 12:36 PM Lauren Carrasquillo RD Registered Dietitian Sign     KJ1.3 5/6/2017  9:22 AM Lauren Carrasquillo RD Registered Dietitian      KJ1.1 5/6/2017  9:15 AM Lauren Carrasquillo RD Registered Dietitian                      Progress Notes - Physician (Notes from 05/10/17 through 05/13/17)      Progress Notes by Jessica Macias APRN CNS at " 5/12/2017  5:30 AM     Author:  Jessica Macias APRN CNS Service:  Palliative Author Type:  Clinical Nurse Specialist    Filed:  5/13/2017 12:22 AM Date of Service:  5/12/2017  5:30 AM Note Created:  5/13/2017 12:12 AM    Status:  Signed :  Jessica Macias APRN CNS (Clinical Nurse Specialist)         [facility]  Palliative Care Progress Note  Text Page     Assessment & Plan   I was asked to see the patient and family for goals of care.     Recommendations:  1. Delirium - Seroquel as per Hospitalist team. Appreciate nursing help to achieve routine with cares and activities, use sensory aides, promote sleep at night and awake during the day. Cymbalta and Namenda restarted 5/6 by Hospitalists.   2. Dyspnea - Promote good pulmonary toilet, position for comfort, breathing, oxygen as previously ordered, offer fan prn. Position for optimal safety and alertness with eating. Will hold on opioid for dyspnea prn for now. Nebs as per Hospitalists.     Goal of Care: Full code with restorative goal. Received copy of HCD 5/9/2017 - copy on chart and will have copy sent to [facility] to be scanned. Reviewed POLST from [place] ([facility] of [place] 2/24/15. Dtr Lisa and wife Nuvia have information on CPR, breathing help, and POLST options for care. Lisa wishes to discuss with brother Loyd when he is in town. They will follow up with provider at [place] if pt is discharged before this time.      Disease Process/es & Symptoms:  Eder Pearson is a 83 year old patient admitted with symptoms of shortness of breath with fever and leukocytosis. He has been treated for aspiration PNA and confusion.      This is in the setting of anxiety, dementia, prostate cancer and chronic kidney disease. He has been hospitalized Jan, 2017 for pneumonia and UTI, and now with aspiration PNA. There is a documented unitentional weight loss of 25 pounds over the past 12 months.     The following symptoms are noted by family  as concerning to pt's quality of life.  Dyspnea - improving with treatment for PNA.  Drowsiness - attributed to seroquel, pneumonia illness, changes to daily routines.  Deconditioning - dtr Lisa reports that pt was limited in his ability to rehab at one point in his stay at [facility], but in the last month had been walking and improving some of his physical strength with PT.     Psychosocial/Spiritual Needs:     Oriented to Spiritual Health and Social Work Services as part of Palliative Care team. SWS is following pt. Referral for Spiritual Health 5/9.   Pt is Scientologist.     Decision-Making & Goals of Care:  Discussion/counseling today about goals of care/decisions:   5/12/17 Visited with Nuvia Gonzalez and pt at supper time. Lisa was feeding pt. Between bites, pt was chewing on corner of a towel. Pt is alert, Verbal at times. Cooperative at present. On RA and IV SL. Eating some. Lisa reviews POC, goals remain unchanged except that brother is not coming to [place] this weekend. Lisa states she has been talking with her siblings about long term decisions based on how her dad does after discharge. Lisa has been working with the SWS and CC and staff of [place] to ensure that pt will have support of ST, and understand diet restrictions when he transfers on 5/13. Lisa reviewed her understanding of how to access hospice if it becomes apparent. I was able to reinforce for process after discharge if needed. She also has information still about Cpr, breathing support, polst and care options. Appreciate , SWS and cc support for pt and family.  5/11/17 Visited with Michel today. Pt's mentation and LOC is unchanged from yesterday. Much time spent discussing his medication management. Reinforced with them that it will be a balancing act to control his agitation, yet keep him awake when needed to participate in his therapies and Eat safely. Encouraged Lisa to check in with the psychiatrist that monitors his  "medications at [facility] care as pt may need adjustments in his medications after discharge, just as the hospitalists are doing for him here, in order to find the balance he will need. Lisa is comparing her dad's medical situation with her brother's who  approximately 25 years ago of HIV related complications. Lisa and Nuvia continue to hope that pt can recover and get stronger. I encouraged them in addition to this hope, to plan and think about what will, should happen if he does not improve. Lisa feels that she has the information she needs to have a discussion with her brother this weekend when he arrives and has texted him that she wants to talk about how aggressive to be with his cares. Lisa verbalized that she wants to, and to talk about what happens the next time he has an aspiration pneumonia.  \"I knew in January that we would eventually be bringing him back with a pneumonia, but I didn't think it would be this soon\". Lisa wants to discuss pt's medications with Dr. Prince today, and criteria and timing for possible discharge. Appreciate check in by CC or SWS today with Lisa and Nuvia regarding discharge plan to [facility] care. They have been following pt. and are aware of family's goals for his care.  5/10/17 No family present this am. Pt alert this am, similar to last evening. Laughed, some short verbal responses, following a few simple commands, and then drifts off to sleep. Noted period of agitation last night. POC continues to be to return to memory care with PT at discharge. Dtr Lisa and wife Nuvia would like to talk with their brother Loyd when he comes back to [place] this weekend regarding pt goals of care. HCD on chart. No change to code status or POLST at present. Lisa and Nuvia are informed of how to request a change to the POLST after discharge with the [facility] group after discharge. They are also informed how to access HOSPICE after discharge. Have been given information on CPR and " Respiratory support for their reference. Appreciate SS and CC support with discharge planning. Updated Dr. Uribe.  5/9/17 Met with dtr Lisa and wife Nuvia in pt room. Pt had been more sedated this am, but after being put into a chair by pt, pt has periods where he opens his eyes, follows some commands, and then drifts back to sleep. Wife Nuvia informs me several times during conversation that Lisa is the family spokesperson regarding pt.  Dtr's concerns were addressed via phone call to Dr. Uribe regarding adjusting pt's seroquel, ensuring that PT is ordered for pt at discharge to [facility] care. Lisa and wife acknowledge that they are emotional and retell stories of the pt. Lisa reiterates that the family currently believe that pt has quality of life, and potential to improve physically. Reviewed their understanding of pt condition, reviewed possible options to support their wish for pt, including DNR/DNI, and/or supportive care, and/or comfort care, and artificial nutrition. Lisa and Nuvia were given information regarding CPR, breathing support, and an example polst with explanation of each care decision discussed. Lisa is leaning toward DNR/DNI with supportive care, but wants to review with her borther Loyd when he comes back from [place] to visit on Sunday. Reviewed current POLST and process to have the POLST updated at [facility], as well as how to access HOSPICE at any point for additional support.   Lisa does not want to make changes to his status until after discussion with her brother this weekend. They are open to  visit and appreciative of SWS and CC assistance with discharge plan back to [facility] care when pt is stable.  5/8/17 Met with dtr Lisa and wife Nuvia in pt room. They retell pt history with dementia and most recent hospitalizations. They believe pt has quality of life when he as at baseline because of his ability to interact with his 3 year old grandson when Loyd, pt's son, comes  "with his family from [place] every 4-6 weeks. They are hopeful that pt can recover to his baseline and return to [facility]. Lisa states that this hospitalization for PNA was \"easier\" than the last one in January. They visit pt at [facility] every day, and dtr Lisa helps him with eating, physical activity. Lisa acknowledged that they are aware of their dad's decline over time. Lisa verbalizes that she sees pts at the [facility] that do not talk, do not have visitors, do not have any interaction with others, they are just taken care of and fed, day after day. She does not want pt to be like that when the time comes, but prior to this admission, her dad was improving in his physical strength. She, her mom and her siblings are talking about code status and care, and she can repeat to me the recommendations that the Hospitalists have told her about code status vs. Supportive or comfort care. \"That is a big change and we would want to call 911 and bring him back to the hospital if his breathing got bad or he was in distress\". Lisa states that she has reviewed information about cpr on the intranet, but was ok with me giving her and her mom information to review about cpr, respiratory support and decision-making. Lisa and Nuvia agreed to discuss further and review POLST tomorrow. Lisa will bring in a copy of pt's HCD.     Patient has decision-making capacity Unreliable  Patient has a Health Care Agent named in a legal Advance Directive document dated 11/5/1997.  Name: Nuvia Pearson, Relationship: spouse, Phone(s):[phone]  First Alternate: Lisa Pearson, Relationship: daughter, Phone(s): [phone](home), [phone] (cell, primary).  Second Alternate: Sarah Pearson, Relationship: daughter. Phone # not obtained.        Patient has a completed health care directive available in the chart (Y/N)Yes. Physician orders for life-sustaining treatment (POLST) form is on file - yes.   Code Status: Full code "      Findings & plan of care discussed with: Bedside RN, CC Mar and LENORA Guzmán.   Follow-up plan from palliative team: Will continue to follow this pt and family for symptom management and goals of care support.  Thank you for involving us in the patient's care.      Jessica VELA, CNS  Pain Management and Palliative Care  [facility]  Pgr: [phone]    Time Spent on this Encounter   I spent 15 minutes in assessment of the patient and discussion with the patient and family. Another 5 minutes in review of chart, documentation and discussion with the health care team.    Interval History   Pt awake and up in gerichair. Dtr Lisa is feeding him. Breathing easy. Smiling at times. Eats food based mostly on cues, follows occasional simple commands.  On RA.  Incontinent of urine.     Review of Systems    C: POSITIVE for change in weight, NEGATIVE for chills or fever.  E/M: NEGATIVE for ear, mouth and throat problems  R: NEGATIVE for significant cough or SOB  CV: NEGATIVE for chest pain, palpitations or peripheral edema    Palliative Symptom Review (0=no symptom/no concern, 1=mild, 2=moderate, 3=severe):      Pain: JANELL - appears comfortable.      Fatigue: JANELL      Nausea: JANELL, eats. No apparent signs of nausea.      Constipation: 0-none      Diarrhea: 0-none      Depressive Symptoms: JANELL      Anxiety: JANELL      Drowsiness: 1-mild      Poor Appetite: 0-none      Shortness of Breath: 0-none per observation.      Insomnia: 0-none      Other:Agitation 2-moderate      Overall (0 good/no concerns, 3 very poor):  2    Physical Exam[JW1.1]   Temp:  [96.6  F (35.9  C)-98  F (36.7  C)] 96.6  F (35.9  C)  Pulse:  [64-67] 67  Resp:  [16-18] 18  BP: (149-162)/(59-78) 149/78  SpO2:  [86 %-94 %] 94 %[JW1.2]  190 lbs 1.6 oz  GEN:  Alert, oriented x 1, appears comfortable, NAD.  HEENT:  Normocephalic/atraumatic, no scleral icterus, no nasal discharge, mouth moist.  CV:Deferred auscultation. 3 DP/PT pulses  bilatererally; no edema BLE.  RESP:  Deferred auscultation.  Symmetric chest rise on inhalation noted.  Normal respiratory effort.  ABD:  Rounded, soft, non-tender/non-distended.  +BS  EXT:  Edema & pulses as noted above.  CMS intact x 4.    M/S:   Non-Tender to palpation.    SKIN:  Dry to touch, no exanthems noted in the visualized areas.    NEURO: Symmetric strength +5/5.  Sensation appears to be intact.   There is no area of allodynia or hyperesthesia.  PAIN BEHAVIOR: Cooperative with some commands.  Psych:  Calm, cooperative with some commands, some verbal responses and laughter.    Medications[JW1.1]        sodium chloride (PF)  3 mL Intracatheter Q8H     multivitamin, therapeutic with minerals  1 tablet Oral Daily     enoxaparin  40 mg Subcutaneous Q24H     trimethoprim-polymyxin b  2 drop Right Eye 4x Daily     DULoxetine  30 mg Oral Daily     memantine (NAMENDA) tablet 10 mg  10 mg Oral Daily     tamsulosin  0.4 mg Oral QPM     QUEtiapine  25 mg Oral At Bedtime[JW1.3]       Data[JW1.1]   Results for orders placed or performed during the hospital encounter of 05/05/17 (from the past 24 hour(s))   Magnesium   Result Value Ref Range    Magnesium 1.8 1.6 - 2.3 mg/dL   Basic metabolic panel   Result Value Ref Range    Sodium 139 133 - 144 mmol/L    Potassium 3.5 3.4 - 5.3 mmol/L    Chloride 105 94 - 109 mmol/L    Carbon Dioxide 28 20 - 32 mmol/L    Anion Gap 6 3 - 14 mmol/L    Glucose 96 70 - 99 mg/dL    Urea Nitrogen 13 7 - 30 mg/dL    Creatinine 0.98 0.66 - 1.25 mg/dL    GFR Estimate 73 >60 mL/min/1.7m2    GFR Estimate If Black 89 >60 mL/min/1.7m2    Calcium 8.6 8.5 - 10.1 mg/dL   CBC with platelets   Result Value Ref Range    WBC 9.4 4.0 - 11.0 10e9/L    RBC Count 2.75 (L) 4.4 - 5.9 10e12/L    Hemoglobin 8.5 (L) 13.3 - 17.7 g/dL    Hematocrit 27.0 (L) 40.0 - 53.0 %    MCV 98 78 - 100 fl    MCH 30.9 26.5 - 33.0 pg    MCHC 31.5 31.5 - 36.5 g/dL    RDW 17.5 (H) 10.0 - 15.0 %    Platelet Count 375 150 - 450  10e9/L[JW1.3]        Revision History        User Key Date/Time User Provider Type Action    > JW1.3 5/13/2017 12:22 AM Jessica Macias APRN CNS Clinical Nurse Specialist Sign     JW1.2 5/13/2017 12:21 AM Jessica Macias APRN CNS Clinical Nurse Specialist      JW1.1 5/13/2017 12:12 AM Jessica Macias APRN CNS Clinical Nurse Specialist             Progress Notes by Pacheco Prince MD at 5/12/2017  7:08 PM     Author:  Pacheco Prince MD Service:  Hospitalist Author Type:  Physician    Filed:  5/12/2017 10:03 PM Date of Service:  5/12/2017  7:08 PM Note Created:  5/12/2017  7:08 PM    Status:  Signed :  Pacheco Prince MD (Physician)         [facility]    Hospitalist Progress Note    Date of Service (when I saw the patient): 05/12/2017    Assessment & Plan     83 year old male with PMH[KP1.1] most significant for advanced[KP1.2] dementia who present[KP1.1]ed[KP1.2] from his memory care facility[KP1.1] on 5/5/27[KP1.2] with shortness of breath found to have fever, leukocytosis and subtle RML infiltrate in the setting of a suspected recent aspiration episode. He is being admitted for treatment of aspiration pneumonia and also has a right eye conjunctivitis.     During course of hospital stay, patient's lethargy has improved,[KP1.1] and has been able to reduce his supplemental oxygen requirements[KP1.2]. Had episode of fever on 5/9[KP1.1], but none since[KP1.2].[KP1.1] He clearly seems to cough with thin liquids and daughter indicates that she has been encouraging him to take in more fluids despite this. SLP confirms the presence of aspiration.    Overall, though the patient appeared to have much less cough and supplemental O2 needs as of 5/11. His VS, exam, WBC and procalcitonin were strongly consistent with the absence of significant infection. For that reason, the ABX were stopped in favor of monitoring the patient off meds.[KP1.2]      Assessment and Plan:     # Sepsis due to RML  infiltrate reflective of apparent aspiration.  Presented with productive cough, dyspnea and subtle RML infiltrate with a mild leukocytosis. Report of aspiration episode while eating a couple of days prior to admission.   -- Initially was on IV Unasyn and then changed to Zosyn. GIven the absence of fever (except for a single spike on 5/9/17) and with procalcitonin < 0.05, will stop abx and monitor.  -  Blood cultures - NGTD  -- supplemental O2 as needed, wean off as tolerated   -- SLP evaluation , on modified diet   -- Aspiration precautions, ensure patient is upright for meals  -- Concern for sedation with medications used for his dementia, please see discussion below, wonder about another episode of aspiration causing fever although clinically looks better  -- Check BNP to screen for CHF, one time IV lasix     # Unilateral conjunctivitis: right eye. this was felt to be somewhat purulent and may be bacterial. In addition to systemic antibiotics, will continue polytrim eye drops.   -- much improved      #  Dementia with behavioral issues: lives in memory care unit.  Has some behavioral issues at baseline.  Will take meds crushed in pudding generally.  -[KP1.1] daughter has advocated for less sedating medications during this hospital stay[KP1.2].      -- Suspect he had component of acute encephalopathy from infection on top of dementia, on admission - that appears to have resolved.   -- at daughter's request, pt had decreased dose of Seroquel and appeared much more alert and interactive. OK to leave other doses available PRN, but will not schedule except for HS.  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 Although again for last one month has been using only the lift. His daughter would like a trial of therapies , PT/OT consulted     Goals of care: Guarded prognosis although family hoping for restorative cares, palliative care consulted, appreciate the consult, dtr hoping that his diet can be advanced as ice cream is one of the few things that he enjoys. Daughter is going to speak more with her bother before making further decisions about code status     DVT Prophylaxis: Enoxaparin (Lovenox) SQ    Code Status: Full Code    Disposition: Expected discharge - pending stability[KP1.1] tomorrow[KP1.2].     Pacheco MARYSOL Prince    Interval History[KP1.1]   More interactive with family at the time of my later visit today. Pt had not responded to me at all initially.   Eating some, but not sure if he is taking much in the way of thickened liquids.[KP1.2]    -Data reviewed today: I reviewed all new labs and imaging results over the last 24 hours. I personally reviewed no images or EKG's today.    Physical Exam   Temp: 98  F (36.7  C) Temp src: Axillary BP: 150/59 Pulse: 64   Resp: 16 SpO2: 91 % O2 Device: None (Room air) Oxygen Delivery: 2 LPM  Vitals:    05/05/17 1717   Weight: 86.2 kg (190 lb 1.6 oz)     Vital Signs with Ranges  Temp:  [97.1  F (36.2  C)-98  F (36.7  C)] 98  F (36.7  C)  Pulse:  [62-65] 64  Resp:  [16-18] 16  BP: (150-162)/(59-72) 150/59  SpO2:  [86 %-94 %] 91 %  I/O last 3 completed shifts:  In: 360 [P.O.:360]  Out: -       Constitutional: Speech is not coherent to me.[KP1.1] Comfortable off supplemental O2.[KP1.2]   Respiratory: No crackles or wheezing noted. No increased WOB or asymmetric chest rise.   Cardiovascular: Regular rate and rhythm, normal S1 and S2, no loud murmur, rubs or gallops noted   Abdomen: Bowel sounds present, soft, non-distended, non-tender   Skin: No exanthems noted on exposed areas, no cyanosis, dry to touch   Neuro: Awake, confused, moving all  extremities , able to converse, mumbles   Extremities: No pitting edema,  skin is warm to touch with signs of adequate peripheral perfusion    Psychiatric: Confused              Medications        sodium chloride (PF)  3 mL Intracatheter Q8H     multivitamin, therapeutic with minerals  1 tablet Oral Daily     enoxaparin  40 mg Subcutaneous Q24H     trimethoprim-polymyxin b  2 drop Right Eye 4x Daily     DULoxetine  30 mg Oral Daily     memantine (NAMENDA) tablet 10 mg  10 mg Oral Daily     tamsulosin  0.4 mg Oral QPM     QUEtiapine  25 mg Oral At Bedtime       Data    Lab/Imaging:  Results for orders placed or performed during the hospital encounter of 05/05/17 (from the past 24 hour(s))   Potassium   Result Value Ref Range    Potassium 3.6 3.4 - 5.3 mmol/L   Magnesium   Result Value Ref Range    Magnesium 1.8 1.6 - 2.3 mg/dL   Basic metabolic panel   Result Value Ref Range    Sodium 139 133 - 144 mmol/L    Potassium 3.5 3.4 - 5.3 mmol/L    Chloride 105 94 - 109 mmol/L    Carbon Dioxide 28 20 - 32 mmol/L    Anion Gap 6 3 - 14 mmol/L    Glucose 96 70 - 99 mg/dL    Urea Nitrogen 13 7 - 30 mg/dL    Creatinine 0.98 0.66 - 1.25 mg/dL    GFR Estimate 73 >60 mL/min/1.7m2    GFR Estimate If Black 89 >60 mL/min/1.7m2    Calcium 8.6 8.5 - 10.1 mg/dL   CBC with platelets   Result Value Ref Range    WBC 9.4 4.0 - 11.0 10e9/L    RBC Count 2.75 (L) 4.4 - 5.9 10e12/L    Hemoglobin 8.5 (L) 13.3 - 17.7 g/dL    Hematocrit 27.0 (L) 40.0 - 53.0 %    MCV 98 78 - 100 fl    MCH 30.9 26.5 - 33.0 pg    MCHC 31.5 31.5 - 36.5 g/dL    RDW 17.5 (H) 10.0 - 15.0 %    Platelet Count 375 150 - 450 10e9/L[KP1.1]           Revision History        User Key Date/Time User Provider Type Action    > KP1.2 5/12/2017 10:03 PM Pacheco Prince MD Physician Sign     KP1.1 5/12/2017  7:08 PM Pacheco Prince MD Physician             Progress Notes by Mar Salazar RN at 5/12/2017 11:09 AM     Author:  Mar Salazar RN Service:  (none) Author Type:       Filed:  5/12/2017  1:59 PM Date of Service:  5/12/2017 11:09 AM Note Created:  5/12/2017 11:09 AM    Status:  Addendum :  Mar Salazar RN ()         Care Transitions Team: Following for CC, discharge planning, and disposition.      Per chart review pt appears to be improving. Spoke with daughter, Lisa who is requesting a call be placed to Heydi Haque at [facility] at which pt resides.  Specifically to discuss SLP recomendations and request follow through at [facility] center as well as discuss changes to Seroquel dosing as this may need re addressing upon care team that pt is followed at the the [facility]. These recommendations have been added to DC paper work     Placed call at [facility], [phone], left VM message, requesting return call in order to update on this concerns.[ED1.1]   Return call from Heydi HAQUE, discussed  Seroquel changes and Diet recommendations.  Requested Copy of diet rec's from Nutrition services at be sent home with pt for [facility] center and then reccommended on going SLP Home care services.[ED1.2]     Pt. is followed by [facility] through the [facility], and RN CC to provide handoff to Sarah, Care Coordinator for [facility] regarding same concerns for transitions of care.     MD: Request for RESUMPTION OF HOME CARE PT/OT/SLP on discharge. Thank you      Daughters Lisa has requested HE transport on discharge.[ED1.1]     WKND SWS/CM : Heydi[ED1.2] is the RN at [facility] and explains that she will anticipate return tomorrow and SWS/CM only need to call if there are questions,[ED1.3] at [phone][ED1.2][phone][ED1.4].  Heydi is requesting a discharge packet to be sent with pt on return --SAME AS YOU WOULD DO FOR TCU --[ED1.3]       Mar Salazar RN BSN CTS  Care Transitions Team  [phone][ED1.1]       Revision History        User Key Date/Time User Provider Type Action    > ED1.3 5/12/2017  1:59 PM Mar Salazar, RN   Addend     ED1.4 5/12/2017  1:51 PM Mar Salazar RN Case Manager Addend     ED1.2 5/12/2017 11:53 AM Mar Salazar RN Case Manager Addend     ED1.1 5/12/2017 11:25 AM Mar Salazar RN Case Manager Sign            Progress Notes by Pacheco Prince MD at 5/11/2017  8:22 AM     Author:  Pacheco Prince MD Service:  Hospitalist Author Type:  Physician    Filed:  5/11/2017 11:17 PM Date of Service:  5/11/2017  8:22 AM Note Created:  5/11/2017  8:22 AM    Status:  Signed :  Pacheco Prince MD (Physician)         [facility]    Hospitalist Progress Note    Date of Service (when I saw the patient): 05/11/2017    Assessment & Plan     83 year old male with PMH including anxiety, dementia who presents from his memory care facility with shortness of breath found to have fever, leukocytosis and subtle RML infiltrate in the setting of a suspected recent aspiration episode. He is being admitted for treatment of aspiration pneumonia and also has a right eye conjunctivitis. During course of hospital stay, patient's lethargy has improved, although still requiring supplemental oxygen. Had episode of fever on 5/9. Repeat CXR shows mild worsening of pneumonia. ABx changed from unasyn to Piperacillin/Tazobactam  Today.      Assessment and Plan:     # Sepsis due to RML[KP1.1] infiltrate reflective of apparent[KP1.2] aspiration.  Presented with productive cough, dyspnea and subtle RML infiltrate with a mild leukocytosis. Report of aspiration episode while eating a couple of days prior to admission.   --[KP1.1] Initially was o[KP1.2]n IV Unasyn[KP1.1] and then changed to Zosyn. GIven the absence of fever (except for a single spike on 5/9/17) and with procalcitonin < 0.05, will stop abx and monitor.[KP1.2]  -  Blood cultures - NGTD  -- supplemental O2 as needed, wean off as tolerated   -- SLP evaluation , on modified diet   -- Aspiration precautions, ensure patient is upright for meals  --  Concern for sedation with medications used for his dementia, please see discussion below, wonder about another episode of aspiration causing fever although clinically looks better  -- Check BNP to screen for CHF, one time IV lasix     # Unilateral conjunctivitis: right eye. this was felt to be somewhat purulent and may be bacterial. In addition to systemic antibiotics, will continue polytrim eye drops.   -- much improved      #  Dementia with behavioral issues: lives in memory care unit.  Has some behavioral issues at baseline.  Will take meds crushed in pudding generally.  -does better with seroquel prn for agitation and would prefer using this to haldol if able.    [KP1.1]  --[KP1.2] Suspect he had component of acute encephalopathy from infection on top of dementia , on admission - that appears to have resolved.[KP1.1]   -- at daughter's request, pt had decreased dose of Seroquel and appeared much more alert and interactive today. 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 Although again for last one month has been using only the lift. His daughter would like a trial of therapies , PT/OT consulted     Goals of care: Guarded prognosis although family hoping for restorative cares, palliative care consulted, appreciate the consult, dtr hoping that his diet can be advanced as ice cream is one of the few things that he enjoys. Daughter is going to speak more with her bother before making further decisions about code status     DVT Prophylaxis: Enoxaparin (Lovenox) SQ    Code Status:  Full Code    Disposition: Expected discharge -[KP1.1] pending stability. Would like to see his O2 need decreasing.[KP1.2]     Pacheco Prince    Interval History   Chart reviewed and patient seen, discussed with nursing staff.[KP1.1]    Pt essentially not interactive with me. Hx and symptom review completed with daughter who is present at bedside.  Pt appears much improved today. No much SOB or cough. More alert (though before I saw him).[KP1.2]    -Data reviewed today: I reviewed all new labs and imaging results over the last 24 hours. I personally reviewed no images or EKG's today.    Physical Exam   Temp: 97.4  F (36.3  C) Temp src: Axillary BP: 153/54 Pulse: 65   Resp: 18 SpO2: 96 % O2 Device: Nasal cannula with humidification Oxygen Delivery: 4 LPM  Vitals:    05/05/17 1717   Weight: 86.2 kg (190 lb 1.6 oz)     Vital Signs with Ranges  Temp:  [97.4  F (36.3  C)-98  F (36.7  C)] 97.4  F (36.3  C)  Pulse:  [60-67] 65  Resp:  [18-22] 18  BP: (139-162)/(53-66) 153/54  SpO2:  [94 %-96 %] 96 %  I/O last 3 completed shifts:  In: 440 [P.O.:440]  Out: -       Constitutional:[KP1.1] Sleepy and speech is not coherent to me.[KP1.2]   Respiratory:[KP1.1] N[KP1.2]o crackles or wheezing noted[KP1.1]. No increased WOB or asymmetric chest rise.[KP1.2]   Cardiovascular: Regular rate and rhythm, normal S1 and S2, no loud murmur, rubs or gallops noted   Abdomen: Bowel sounds present, soft, non-distended, non-tender   Skin: No exanthems noted on exposed areas, no cyanosis, dry to touch   Neuro: Awake, confused, moving all extremities , able to converse, mumbles   Extremities: No pitting edema,  skin is warm to touch with signs of adequate peripheral perfusion    Psychiatric: Confused              Medications        sodium chloride (PF)  3 mL Intracatheter Q8H     piperacillin-tazobactam  3.375 g Intravenous Q6H     ipratropium - albuterol 0.5 mg/2.5 mg/3 mL  3 mL Nebulization 4x daily     multivitamin, therapeutic with minerals  1  tablet Oral Daily     enoxaparin  40 mg Subcutaneous Q24H     trimethoprim-polymyxin b  2 drop Right Eye 4x Daily     DULoxetine  30 mg Oral Daily     memantine (NAMENDA) tablet 10 mg  10 mg Oral Daily     tamsulosin  0.4 mg Oral QPM     QUEtiapine  25 mg Oral At Bedtime       Data    Lab/Imaging:[KP1.1]  Results for orders placed or performed during the hospital encounter of 05/05/17 (from the past 24 hour(s))   Glucose by meter   Result Value Ref Range    Glucose 104 (H) 70 - 99 mg/dL   CBC with platelets   Result Value Ref Range    WBC 8.1 4.0 - 11.0 10e9/L    RBC Count 2.69 (L) 4.4 - 5.9 10e12/L    Hemoglobin 8.3 (L) 13.3 - 17.7 g/dL    Hematocrit 26.5 (L) 40.0 - 53.0 %    MCV 99 78 - 100 fl    MCH 30.9 26.5 - 33.0 pg    MCHC 31.3 (L) 31.5 - 36.5 g/dL    RDW 17.2 (H) 10.0 - 15.0 %    Platelet Count 335 150 - 450 10e9/L   Basic metabolic panel   Result Value Ref Range    Sodium 142 133 - 144 mmol/L    Potassium 3.2 (L) 3.4 - 5.3 mmol/L    Chloride 105 94 - 109 mmol/L    Carbon Dioxide 30 20 - 32 mmol/L    Anion Gap 7 3 - 14 mmol/L    Glucose 95 70 - 99 mg/dL    Urea Nitrogen 10 7 - 30 mg/dL    Creatinine 0.92 0.66 - 1.25 mg/dL    GFR Estimate 79 >60 mL/min/1.7m2    GFR Estimate If Black >90   GFR Calc   >60 mL/min/1.7m2    Calcium 8.5 8.5 - 10.1 mg/dL   Magnesium   Result Value Ref Range    Magnesium 1.9 1.6 - 2.3 mg/dL   NT proBNP inpatient and ED   Result Value Ref Range    N-Terminal Pro BNP Inpatient 2878 (H) 0 - 1800 pg/mL   Procalcitonin   Result Value Ref Range    Procalcitonin  ng/ml     <0.05  <0.05 ng/ml  Normal  Recommendation: Very low risk of bacterial infection.   Discourage antibiotics unless strong clinical suspicion for serious infection.     Potassium   Result Value Ref Range    Potassium 3.6 3.4 - 5.3 mmol/L[KP1.3]           Revision History        User Key Date/Time User Provider Type Action    > KP1.3 5/11/2017 11:17 PM Pacheco Prince MD Physician Sign     KP1.2 5/11/2017  11:10 PM Pacheco Prince MD Physician      KP1.1 5/11/2017  8:22 AM Pacheco Prince MD Physician             Progress Notes by Jose Armando Calvillo at 5/11/2017  4:18 PM     Author:  Jose Armando Calvillo Service:  Spiritual Health Author Type:      Filed:  5/11/2017  4:40 PM Date of Service:  5/11/2017  4:18 PM Note Created:  5/11/2017  4:18 PM    Status:  Signed :  Jose Armando Calvillo ()         SPIRITUAL HEALTH SERVICES Progress Note  [facility]    Visited Eder Pearson for ongoing emotional/spiritual support.[BD1.1]  Adrian was awake but not very responsive.  His[BD1.2] spouse Nuvia and daughter Lisa were present.  Offered reflective conversation and prayer.      Illness Narrative - Lisa[BD1.1] reported that Adrian has a history of dementia which has worsen since their move to [place] two years ago.[BD1.2]      Coping -[BD1.1]   o Nuvia shared that Adrian's [employer] liliana is very important to him and requested prayer.  o She named Lisa and her other two surviving children who live in [place] as being central to their support system.  o Nuvia and Lisa attend services at [facility] in [place].[BD1.2]      Meaning-Making -[BD1.1] Nuvia shared a couple of stories that illustrated how their liliana has been an integral part of their 60 years of marriage.[BD1.2]        Plan -[BD1.1] I will refer pt to unit  requesting further provision of prayer.[BD1.2]    Jose Armando Calvillo M.Div., [facility]  Staff   Pager [phone][BD1.1]     Revision History        User Key Date/Time User Provider Type Action    > BD1.2 5/11/2017  4:40 PM Jose Armando Calvillo  Sign     BD1.1 5/11/2017  4:18 PM Jose Armando Calvillo              Progress Notes by Jessica Macias APRN CNS at 5/11/2017  2:44 PM     Author:  Jessica Macias DANE CNS Service:  Palliative Author Type:  Clinical Nurse Specialist    Filed:  5/11/2017  3:07 PM Date of Service:  5/11/2017   2:44 PM Note Created:  5/11/2017  2:44 PM    Status:  Signed :  Jessica Macias APRN CNS (Clinical Nurse Specialist)         [facility]  Palliative Care Progress Note  Text Page[JW1.1]     Assessment & Plan[JW1.2]   I was asked to see the patient and family for goals of care.     Recommendations:  1. Delirium - Seroquel as per Hospitalist team. Appreciate nursing help to achieve routine with cares and activities, use sensory aides, promote sleep at night and awake during the day. Cymbalta and Namenda restarted 5/6 by Hospitalists.   2. Dyspnea - Promote good pulmonary toilet, position for comfort, breathing, oxygen as previously ordered, offer fan prn. Position for optimal safety and alertness with eating. Will hold on opioid for dyspnea prn for now. Nebs as per Hospitalists.     Goal of Care: Full code with restorative goal. Received copy of [facility] 5/9/2017 - copy on chart and will have copy sent to [facility] to be scanned. Reviewed POLST from [place] ([facility] of [place] 2/24/15. Dtr Lisa and wife Nuvia have information on CPR, breathing help, and POLST options for care. Lisa wishes to discuss with brother Loyd when he is in town this weekend. They will follow up with provider at [place] if pt is discharged before this time.      Disease Process/es & Symptoms:  Eder Pearson is a 83 year old patient admitted with symptoms of shortness of breath with fever and leukocytosis. He has been treated for aspiration PNA and confusion.      This is in the setting of anxiety, dementia, prostate cancer and chronic kidney disease. He has been hospitalized Jan, 2017 for pneumonia and UTI, and now with aspiration PNA. There is a documented unitentional weight loss of 25 pounds over the past 12 months.     The following symptoms are noted by family as concerning to pt's quality of life.  Dyspnea - improving with treatment for PNA.  Drowsiness - attributed to seroquel, pneumonia illness, changes to  "daily routines.  Deconditioning - dtr Lisa reports that pt was limited in his ability to rehab at one point in his stay at TCU, but in the last month had been walking and improving some of his physical strength with PT.     Psychosocial/Spiritual Needs:     Oriented to Spiritual Health and Social Work Services as part of Palliative Care team. SWS is following pt. Referral for Spiritual Health .   Pt is Yarsanism.     Decision-Making & Goals of Care:  Discussion/counseling today about goals of care/decisions:   17 Visited with Lisa and Nuvia today. Pt's mentation and LOC is unchanged from yesterday. Much time spent discussing his medication management. Reinforced with them that it will be a balancing act to control his agitation, yet keep him awake when needed to participate in his therapies and Eat safely. Encouraged Lisa to check in with the psychiatrist that monitors his medications at [facility] as pt may need adjustments in his medications after discharge, just as the hospitalists are doing for him here, in order to find the balance he will need. Lisa is comparing her dad's medical situation with her brother's who  approximately 25 years ago of HIV related complications. Lisa and Nuvia continue to hope that pt can recover and get stronger. I encouraged them in addition to this hope, to plan and think about what will, should happen if he does not improve. Lisa feels that she has the information she needs to have a discussion with her brother this weekend when he arrives and has texted him that she wants to talk about how aggressive to be with his cares. Lisa verbalized that she wants to, and to talk about what happens the next time he has an aspiration pneumonia.  \"I knew in January that we would eventually be bringing him back with a pneumonia, but I didn't think it would be this soon\". Lisa wants to discuss pt's medications with Dr. Prince today, and criteria and timing for possible " discharge. Appreciate check in by CC or SWS today with Lisa and Nuvia regarding discharge plan to memory care. They have been following pt. and are aware of family's goals for his care.  5/10/17 No family present this am. Pt alert this am, similar to last evening. Laughed, some short verbal responses, following a few simple commands, and then drifts off to sleep. Noted period of agitation last night. POC continues to be to return to memory care with PT at discharge. Dtr Lisa and wife Nuvia would like to talk with their brother Loyd when he comes back to [place] this weekend regarding pt goals of care. HCD on chart. No change to code status or POLST at present. Lisa and Nuvia are informed of how to request a change to the POLST after discharge with the [facility] group after discharge. They are also informed how to access HOSPICE after discharge. Have been given information on CPR and Respiratory support for their reference. Appreciate SS and CC support with discharge planning. Updated Dr. Uribe.  5/9/17 Met with dtr Lisa and wife Nuvia in pt room. Pt had been more sedated this am, but after being put into a chair by pt, pt has periods where he opens his eyes, follows some commands, and then drifts back to sleep. Wife Nuvia informs me several times during conversation that Lisa is the family spokesperson regarding pt.  Dtr's concerns were addressed via phone call to Dr. Uribe regarding adjusting pt's seroquel, ensuring that PT is ordered for pt at discharge to memory care. Lisa and wife acknowledge that they are emotional and retell stories of the pt. Lisa reiterates that the family currently believe that pt has quality of life, and potential to improve physically. Reviewed their understanding of pt condition, reviewed possible options to support their wish for pt, including DNR/DNI, and/or supportive care, and/or comfort care, and artificial nutrition. Lisa and Nuvia were given information regarding CPR,  "breathing support, and an example polst with explanation of each care decision discussed. Lisa is leaning toward DNR/DNI with supportive care, but wants to review with her borther Loyd when he comes back from [place] to visit on Sunday. Reviewed current POLST and process to have the POLST updated at [facility], as well as how to access HOSPICE at any point for additional support.   Lisa does not want to make changes to his status until after discussion with her brother this weekend. They are open to  visit and appreciative of SWS and CC assistance with discharge plan back to [facility] when pt is stable.  5/8/17 Met with dtr Lisa and wife Nuvia in pt room. They retell pt history with dementia and most recent hospitalizations. They believe pt has quality of life when he as at baseline because of his ability to interact with his 3 year old grandson when Loyd, pt's son, comes with his family from [place] every 4-6 weeks. They are hopeful that pt can recover to his baseline and return to [facility] care. Lisa states that this hospitalization for PNA was \"easier\" than the last one in January. They visit pt at [facility] every day, and dtr Lisa helps him with eating, physical activity. Lisa acknowledged that they are aware of their dad's decline over time. Lisa verbalizes that she sees pts at the [facility] that do not talk, do not have visitors, do not have any interaction with others, they are just taken care of and fed, day after day. She does not want pt to be like that when the time comes, but prior to this admission, her dad was improving in his physical strength. She, her mom and her siblings are talking about code status and care, and she can repeat to me the recommendations that the Hospitalists have told her about code status vs. Supportive or comfort care. \"That is a big change and we would want to call 911 and bring him back to the hospital if his breathing got bad or he was in distress\". " Lisa states that she has reviewed information about cpr on the intranet, but was ok with me giving her and her mom information to review about cpr, respiratory support and decision-making. Lisa and Nuvia agreed to discuss further and review POLST tomorrow. Lisa will bring in a copy of pt's HCD.     Patient has decision-making capacity Unreliable  Patient has a Health Care Agent named in a legal Advance Directive document dated 11/5/1997.  Name: Nuvia Pearson, Relationship: spouse, Phone(s):[phone]  First Alternate: Lisa Pearson, Relationship: daughter, Phone(s): [phone](home), [phone] (cell, primary).  Second Alternate: Sarah Pearson, Relationship: daughter. Phone # not obtained.        Patient has a completed health care directive available in the chart (Y/N)Yes. Physician orders for life-sustaining treatment (POLST) form is on file - yes.   Code Status: Full code      Findings & plan of care discussed with: Bedside RN, JF Manuel and LENORA Guzmán.   Follow-up plan from palliative team: Will continue to follow this pt and family for symptom management and goals of care support.  Thank you for involving us in the patient's care.    [JW1.1]  Jessica Macias[JW1.2] APRN, CNS  Pain Management and Palliative Care  [facility]  Pgr: [phone][JW1.1]    Time Spent on this Encounter[JW1.2]   I spent 25 minutes in assessment of the patient and discussion with the patient and family. Another 10 minutes in review of chart, documentation and discussion with the health care team.[JW1.1]    Interval History[JW1.2]   Pt awake at times during my visit, and then drifts back to sleep. Verbal responses similar to yesterday. Following some basic commands. Breathing easy. No cough, but still on oxygen. Note cxr results and change in antibiotics by Dr. Uribe.  Incontinent of urine.[JW1.1]     Review of Systems[JW1.2]    C: POSITIVE for change in weight, NEGATIVE for chills or fever.  E/M: NEGATIVE for ear,  mouth and throat problems  R: NEGATIVE for significant cough or SOB  CV: NEGATIVE for chest pain, palpitations or peripheral edema    Palliative Symptom Review (0=no symptom/no concern, 1=mild, 2=moderate, 3=severe):      Pain: JANELL - appears comfortable.      Fatigue: JANELL      Nausea: JANELL, eats. No apparent signs of nausea.      Constipation: 0-none      Diarrhea: 0-none      Depressive Symptoms: JANELL      Anxiety: JANELL      Drowsiness: 2-moderate      Poor Appetite: 0-none      Shortness of Breath: 0-none per observation.      Insomnia: 0-none      Other:Agitation 2-moderate      Overall (0 good/no concerns, 3 very poor):  2[JW1.1]    Physical Exam[JW1.2]   Temp:  [97.3  F (36.3  C)-98  F (36.7  C)] 97.3  F (36.3  C)  Pulse:  [60-68] 68  Resp:  [18-22] 20  BP: (139-162)/(50-66) 144/50  SpO2:  [90 %-96 %] 92 %[JW1.3]  190 lbs 1.6 oz[JW1.2]  GEN:  Drowsy, oriented x 1, appears comfortable, NAD.  HEENT:  Normocephalic/atraumatic, no scleral icterus, no nasal discharge, mouth moist.  CV:  RRR, S1, S2; no murmurs or other irregularities noted.  +3 DP/PT pulses bilatererally; no edema BLE.  RESP:  Clear to auscultation anteriorly bilaterally without rales/rhonchi/wheezing/retractions.  Symmetric chest rise on inhalation noted.  Normal respiratory effort.  ABD:  Rounded, soft, non-tender/non-distended.  +BS  EXT:  Edema & pulses as noted above.  CMS intact x 4.  Edema in hands +1 bilat.  M/S:   Non-Tender to palpation.    SKIN:  Dry to touch, no exanthems noted in the visualized areas.    NEURO: Symmetric strength +5/5.  Sensation appears to be intact.   There is no area of allodynia or hyperesthesia.  PAIN BEHAVIOR: Cooperative with some commands.  Psych:  Calm, cooperative with some commands, some verbal responses and laughter.[JW1.1]    Medications[JW1.2]        sodium chloride (PF)  3 mL Intracatheter Q8H     piperacillin-tazobactam  3.375 g Intravenous Q6H     ipratropium - albuterol 0.5 mg/2.5 mg/3 mL  3 mL  Nebulization 4x daily     multivitamin, therapeutic with minerals  1 tablet Oral Daily     enoxaparin  40 mg Subcutaneous Q24H     trimethoprim-polymyxin b  2 drop Right Eye 4x Daily     DULoxetine  30 mg Oral Daily     memantine (NAMENDA) tablet 10 mg  10 mg Oral Daily     tamsulosin  0.4 mg Oral QPM     QUEtiapine  25 mg Oral At Bedtime[JW1.4]       Data[JW1.2]   Results for orders placed or performed during the hospital encounter of 05/05/17 (from the past 24 hour(s))   Potassium   Result Value Ref Range    Potassium 3.4 3.4 - 5.3 mmol/L   Glucose by meter   Result Value Ref Range    Glucose 104 (H) 70 - 99 mg/dL   CBC with platelets   Result Value Ref Range    WBC 8.1 4.0 - 11.0 10e9/L    RBC Count 2.69 (L) 4.4 - 5.9 10e12/L    Hemoglobin 8.3 (L) 13.3 - 17.7 g/dL    Hematocrit 26.5 (L) 40.0 - 53.0 %    MCV 99 78 - 100 fl    MCH 30.9 26.5 - 33.0 pg    MCHC 31.3 (L) 31.5 - 36.5 g/dL    RDW 17.2 (H) 10.0 - 15.0 %    Platelet Count 335 150 - 450 10e9/L   Basic metabolic panel   Result Value Ref Range    Sodium 142 133 - 144 mmol/L    Potassium 3.2 (L) 3.4 - 5.3 mmol/L    Chloride 105 94 - 109 mmol/L    Carbon Dioxide 30 20 - 32 mmol/L    Anion Gap 7 3 - 14 mmol/L    Glucose 95 70 - 99 mg/dL    Urea Nitrogen 10 7 - 30 mg/dL    Creatinine 0.92 0.66 - 1.25 mg/dL    GFR Estimate 79 >60 mL/min/1.7m2    GFR Estimate If Black >90   GFR Calc   >60 mL/min/1.7m2    Calcium 8.5 8.5 - 10.1 mg/dL   Magnesium   Result Value Ref Range    Magnesium 1.9 1.6 - 2.3 mg/dL   NT proBNP inpatient and ED   Result Value Ref Range    N-Terminal Pro BNP Inpatient 2878 (H) 0 - 1800 pg/mL   Procalcitonin   Result Value Ref Range    Procalcitonin  ng/ml     <0.05  <0.05 ng/ml  Normal  Recommendation: Very low risk of bacterial infection.   Discourage antibiotics unless strong clinical suspicion for serious infection.[JW1.4]          Revision History        User Key Date/Time User Provider Type Action    > JW1.4 5/11/2017  3:07 PM  Jessica Macias APRN CNS Clinical Nurse Specialist Sign     JW1.3 5/11/2017  2:56 PM Jessica Macias APRN CNS Clinical Nurse Specialist      JW1.2 5/11/2017  2:45 PM Jessica Macias APRN CNS Clinical Nurse Specialist      JW1.1 5/11/2017  2:44 PM Jessica Macias APRN CNS Clinical Nurse Specialist             Progress Notes by Awilda Uribe MD at 5/10/2017  2:41 PM     Author:  Awilda Uribe MD Service:  (none) Author Type:  Physician    Filed:  5/10/2017  2:51 PM Date of Service:  5/10/2017  2:41 PM Note Created:  5/10/2017  2:41 PM    Status:  Signed :  Awilda Uribe MD (Physician)         [facility]    Hospitalist Progress Note    Date of Service (when I saw the patient): 05/10/2017    Assessment & Plan     83 year old male with PMH including anxiety, dementia who presents from his memory care facility with shortness of breath found to have fever, leukocytosis and subtle RML infiltrate in the setting of a suspected recent aspiration episode. He is being admitted for treatment of aspiration pneumonia and also has a right eye conjunctivitis. During course of hospital stay, patient's lethargy has improved, although still requiring supplemental oxygen. Had episode of fever on 5/9. Repeat CXR shows mild worsening of pneumonia. ABx changed from unasyn to Piperacillin/Tazobactam  Today.      Assessment and Plan:     # Sepsis due to RML pneumonia, suspected to be aspiration.  Presented with productive cough, dyspnea and subtle RML infiltrate with a mild leukocytosis. Report of aspiration episode while eating a couple of days prior to admission.     -- On IV Unasyn , given CXR findings today, will broaden to Piperacillin/Tazobactam    -  Blood cultures - NGTD  -- supplemental O2 as needed, wean off as tolerated   -- SLP evaluation , on modified diet   -- Aspiration precautions, ensure patient is upright for meals  -- Concern for sedation with medications used for  his dementia, please see discussion below, wonder about another episode of aspiration causing fever although clinically looks better  -- Check BNP to screen for CHF, one time IV lasix     # Unilateral conjunctivitis: right eye. this was felt to be somewhat purulent and may be bacterial. In addition to systemic antibiotics, will continue polytrim eye drops.   -- much improved      #  Dementia with behavioral issues: lives in memory care unit.  Has some behavioral issues at baseline.  Will take meds crushed in pudding generally.  -does better with seroquel prn for agitation and would prefer using this to haldol if able.        -Suspect he had component of acute encephalopathy from infection on top of dementia , on admission - that appears to have resolved.     -- discussed with daughter in detail about Seroquel dosing, at his memory he takes it 12.5 three times in day and 25 mg at HS. Per daughter, the facility prefers the scheduled dose as it works with his schedule and they have difficulty giving PRN once he gets worked up. She initially requested that we try decreasing the morning dose and see if it helps. However, in hospital, most days his day time dose has been held, and the days that he received it in morning, he tends to be more tired and lethargic. At this time, I would favour stopping all scheduled Seroquel in day, and just use the 25 mg scheduled at night, with PRN available. Daughter is in agreement.     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 Although again for last one month has been using only the lift. His daughter would like a trial of therapies ,  PT/OT consulted     Goals of care: Guarded prognosis although family hoping for restorative cares, palliative care consulted, appreciate the consult, dtr hoping that his diet can be advanced as ice cream is one of the few things that he enjoys. Daughter is going to speak more with her bother before making further decisions about code status     DVT Prophylaxis: Enoxaparin (Lovenox) SQ    Code Status: Full Code    Disposition: Expected discharge - 2-3 days once fever resolved and mentation improved, able to wean off oxygen     Updated daughter Lisa and spouse at bedside.     [facility]    Interval History   Chart reviewed and patient seen, discussed with nursing staff, patient once again tired today after Seroquel, despite the lower dose, able to answer questions but not very interactive, denies any pain    -Data reviewed today: I reviewed all new labs and imaging results over the last 24 hours. I personally reviewed no images or EKG's today.    Physical Exam   Temp: 98.7  F (37.1  C) Temp src: Axillary BP: 142/50 Pulse: 63   Resp: 16 SpO2: 94 % O2 Device: Nasal cannula Oxygen Delivery: 3 LPM  Vitals:    05/05/17 1717   Weight: 86.2 kg (190 lb 1.6 oz)     Vital Signs with Ranges  Temp:  [98.2  F (36.8  C)-98.8  F (37.1  C)] 98.7  F (37.1  C)  Pulse:  [61-64] 63  Resp:  [16-18] 16  BP: (126-148)/(43-62) 142/50  SpO2:  [84 %-95 %] 94 %  I/O last 3 completed shifts:  In: 629 [P.O.:150; I.V.:479]  Out: -       Constitutional: Awake.,  right eye swelling and redness looks markedly better    Respiratory: Coarse sounds in right lung, no crackles or wheezing noted,   Cardiovascular: Regular rate and rhythm, normal S1 and S2, no loud murmur, rubs or gallops noted   Abdomen: Bowel sounds present, soft, non-distended, non-tender   Skin: No exanthems noted on exposed areas, no cyanosis, dry to touch   Neuro: Awake, confused, moving all extremities , able to converse, mumbles   Extremities: No pitting edema,  skin is warm  to touch with signs of adequate peripheral perfusion    Psychiatric: Confused              Medications        piperacillin-tazobactam  3.375 g Intravenous Q6H     furosemide  20 mg Intravenous Once     ipratropium - albuterol 0.5 mg/2.5 mg/3 mL  3 mL Nebulization 4x daily     multivitamin, therapeutic with minerals  1 tablet Oral Daily     enoxaparin  40 mg Subcutaneous Q24H     trimethoprim-polymyxin b  2 drop Right Eye 4x Daily     DULoxetine  30 mg Oral Daily     memantine (NAMENDA) tablet 10 mg  10 mg Oral Daily     tamsulosin  0.4 mg Oral QPM     QUEtiapine  25 mg Oral At Bedtime       Data   All new lab data and imaging results from today have been reviewed       Recent Labs  Lab 05/10/17  0705 05/09/17  0710 05/07/17  1555 05/06/17  0743 05/05/17  1358   WBC 8.5  --  7.8 10.0 11.7*   HGB 8.4*  --  9.5* 9.5* 9.9*     --  101* 100 100    308 282 215 233   *  --  142 141 140   POTASSIUM 3.3* 3.4 3.7 3.7 4.2   CHLORIDE 110*  --  109 106 106   CO2 28  --  29 28 30   BUN 11  --  17 18 24   CR 0.83 0.89 0.90 0.92 1.11   ANIONGAP 7  --  4 7 4   ARANZA 8.4*  --  8.6 8.7 9.1   GLC 96  --  109* 88 98   ALBUMIN  --   --   --   --  3.0*   PROTTOTAL  --   --   --   --  7.2   BILITOTAL  --   --   --   --  1.0   ALKPHOS  --   --   --   --  54   ALT  --   --   --   --  18   AST  --   --   --   --  18       Recent Results (from the past 24 hour(s))   XR Chest Port 1 View    Narrative    CHEST PORTABLE ONE VIEW  5/10/2017 9:23 AM     HISTORY: Re-evaluate new fever.     COMPARISON: Chest x-ray 5/5/2017.      Impression    IMPRESSION: Portable view of the chest is performed. There is a new  patchy infiltrate at the lung bases concerning for pneumonia. Lungs  are hypoaerated which is unchanged. Heart appears mildly prominent in  size, but is unchanged. No pneumothorax. Probable trace bilateral  pleural effusions. Subtle underlying CHF is also possible.    SAMIA ROBLERO MD[TR1.1]          Revision History        User  Key Date/Time User Provider Type Action    > TR1.1 5/10/2017  2:51 PM Awilda Uribe MD Physician Sign            Progress Notes by Calixto Huerta at 5/10/2017  2:04 PM     Author:  Calixto Huerta Service:  Spiritual Health Author Type:      Filed:  5/10/2017  2:07 PM Date of Service:  5/10/2017  2:04 PM Note Created:  5/10/2017  2:04 PM    Status:  Signed :  Calixto Huerta ()         [facility] SERVICES Progress Note  [facility]  [facility] visit due to Epic consult order for prayer for pt.  Pt was sleeping and didn't appear to wake, writer offered prayer.  [facility]S will cont to follow.       Calixto CASTELLANO [facility]  Staff    [phone][MG1.1]       Revision History        User Key Date/Time User Provider Type Action    > MG1.1 5/10/2017  2:07 PM Calixto Huerta Sign            Progress Notes by Mecca Lynn RD, LD at 5/10/2017 10:50 AM     Author:  Mecca Lynn RD, LD Service:  Nutrition Author Type:  Registered Dietitian    Filed:  5/10/2017  2:05 PM Date of Service:  5/10/2017 10:50 AM Note Created:  5/10/2017 10:50 AM    Status:  Signed :  Mecca Lynn RD, LD (Registered Dietitian)         CLINICAL NUTRITION SERVICES - REASSESSMENT NOTE      EVALUATION OF PROGRESS TOWARD GOALS   Diet Order / Food intake and Liquid meal replacement or supplement - Ability to advance diet per SLP and respective intake (% of meals, supplements)  Update/Evaluation:[MS1.1] Diet upgraded to DD2 + NTL per SLP 5/8.  Patient continues with Magic Shake supplement with meals and standard trays[MS1.2] (not appropriate for room service)[MS1.3].  Per flowsheet review, patient consuming[MS1.2] variable amounts,[MS1.3]  0-100%[MS1.2],[MS1.3] of meals since last RD assessment.  Of note,[MS1.2] pt[MS1.3] requires feeding assistance.[MS1.2]  Per discussion with LPN/RN, patient only took bites of breakfast and lunch today prior to[MS1.3] refusa[MS1.2]l d/t poor appetite.  RN also  reported component of lethargy impacting oral intakes.  Likely meeting <75% needs, or less, x 5 days since admission.[MS1.3]        Dosing Weight: 86.2 kg (admit weight)     ASSESSED NUTRITION NEEDS (PER APPROVED PRACTICE GUIDELINES):  Estimated Energy Needs: 3176-2972 kcals (20-25 Kcal/Kg)  Justification: maintenance / limited activity   Estimated Protein Needs:  grams protein (1-1.2 g pro/Kg)  Justification: maintenance  Estimated Fluid Needs: 4549-0386 mL (1 mL/Kcal)  Justification: maintenance      NEW FINDINGS:[MS1.1]   - Palliative following, full code restorative goals.   - Medications reviewed:   Daily MVI/M --> not consistently receiving d/t decreased alertness and lethargy/sedation  - BM 5/9.[MS1.2]    - No updated weight since admission.[MS1.3]     Previous Goals:   Pt to consistently consume >/=50% of meals TID + 2 supplements/day  Evaluation:[MS1.1] Not met[MS1.3]    Previous Nutrition Diagnosis:   Predicted suboptimal nutrient intake (protein-energy) related to swallowing difficulties secondary to dysphagia and weakness   Evaluation:[MS1.1] Declining, changed below[MS1.3]      MALNUTRITION  % Weight Loss:[MS1.1]  None noted (during admit)[MS1.3]  % Intake:[MS1.1]  Decreased intake does not meet criteria for malnutrition[MS1.3]   Subcutaneous Fat Loss:[MS1.1]  None observed though limited as pt sleeping[MS1.3]  Muscle Loss:[MS1.1]  Temporal region mild depletion[MS1.3]  Fluid Retention:[MS1.1]  None noted[MS1.3]    Malnutrition Diagnosis:[MS1.1] Patient does not meet two of the above criteria necessary for diagnosing malnutrition though is at risk[MS1.3]      CURRENT NUTRITION DIAGNOSIS[MS1.1]  Inadequate oral intake[MS1.3] related to[MS1.1] decreased appetite and self-feeding difficulties with dementia, with component of lethargy/overmedication[MS1.3] as evidenced by[MS1.1] meeting <75% needs x 5 days since admission.[MS1.3]     INTERVENTIONS  Recommendations / Nutrition  Prescription[MS1.1]  Continue with diet as recommended by SLP, currently DD2 + NTL    Continue with standard trays.      Change to scheduled Magic Shakes between meals and add offerings of Magic Cup with meals     Appreciate Nursing staff providing support and encouragement at meal times.     Ok to continue with daily MVI/M.      Further nutrition-related interventions pending improvement in alertness.[MS1.3]      Implementation[MS1.1]  Medical Food Supplement: As above.      Collaboration and Referral of Nutrition care: Discussed POC and oral intakes with RN/LPN.[MS1.3]      Goals[MS1.1]  Patient to consume at least 50% of meals TID + 1 supplement daily.[MS1.3]      MONITORING AND EVALUATION:[MS1.1]  Diet Order, Food intake and Liquid meal replacement or supplement - Changes per SLP, adequacy.[MS1.3]        Mecca Lynn RD, CORRY  Clinical Dietitian  3rd floor/ICU: [phone]  All other floors: [phone]  Weekend/holiday: [phone][MS1.1]     Revision History        User Key Date/Time User Provider Type Action    > MS1.3 5/10/2017  2:05 PM Mecca Lynn RD, LD Registered Dietitian Sign     MS1.2 5/10/2017  1:17 PM Mecca Lynn RD, LD Registered Dietitian      MS1.1 5/10/2017 10:50 AM Mecca Lynn RD, CORRY Registered Dietitian             Progress Notes by Mar Salazar RN at 5/10/2017 11:48 AM     Author:  Mar Salazar RN Service:  (none) Author Type:      Filed:  5/10/2017 11:51 AM Date of Service:  5/10/2017 11:48 AM Note Created:  5/10/2017 11:48 AM    Status:  Signed :  Mar Salazar RN ()         Care Transitions Team: Following for CC, discharge planning, and disposition.      Per chart review identified on going needs for home 02. Contact made to JOSEPH Soliz at [facility] who explains that pt is not on home 02 there but that they could accommodate this if needed, at an extra charge to family. [facility] prefers [facility] for  02 needs.    Update provided to Heydi who relays likely she will need to come and reassess for return to [facility].    RN CC explained daily updated to be provided to her.    Mar Salazar RN BSN CTS  Care Transitions Team  [phone][ED1.1]       Revision History        User Key Date/Time User Provider Type Action    > ED1.1 5/10/2017 11:51 AM Mar Salazar RN Case Manager Sign            Progress Notes by Jessica Macias APRN CNS at 5/9/2017  2:15 PM     Author:  Jessica Macias APRN CNS Service:  Palliative Author Type:  Clinical Nurse Specialist    Filed:  5/10/2017  9:41 AM Date of Service:  5/9/2017  2:15 PM Note Created:  5/9/2017  2:15 PM    Status:  Addendum :  Jessica Macias APRN CNS (Clinical Nurse Specialist)         [facility]  Palliative Care Progress Note  Text Page     Assessment & Plan   I was asked to see the patient and family for goals of care.     Recommendations:  1. Delirium - Seroquel as per Hospitalist team. Appreciate nursing help to achieve routine with cares and activities, use sensory aides, promote sleep at night and awake during the day. Cymbalta and Namenda restarted 5/6 by Hospitalists.  2. Dyspnea - Promote good pulmonary toilet, position for comfort, breathing, oxygen as previously ordered, offer fan prn. Position for optimal safety and alertness with eating. Will hold on opioid for dyspnea prn for now.[JW1.1] Nebs as per Hospitalists.[JW1.2]     Goal of Care: Full code[JW1.1] with restorative goal[JW1.2].[JW1.1] Received copy of[JW1.2] HCD[JW1.1] 5/9/2017 - copy on chart and will have copy sent to [facility] to be scanned[JW1.2]. [facility] [place] that is identical to POLST on file for [facility] from 1/2017[JW1.1]. Dtr Lisa and wife Nuvia have information on CPR, breathing help, and POLST options for care. Lisa wishes to discuss with brother Loyd when he is in town this weekend. They will follow up with provider at  [facility] if pt is discharged before this time.[JW1.2]      Disease Process/es & Symptoms:  Eder Pearson is a 83 year old patient admitted with symptoms of shortness of breath with fever and leukocytosis. He has been treated for aspiration PNA and confusion.      This is in the setting of anxiety, dementia, prostate cancer and chronic kidney disease. He has been hospitalized Jan, 2017 for pneumonia and UTI, and now with aspiration PNA. There is a documented unitentional weight loss of 25 pounds over the past 12 months.     The following symptoms are noted by family as concerning to pt's quality of life.  Dyspnea - improving with treatment for PNA.  Drowsiness - attributed to seroquel, pneumonia illness, changes to daily routines.  Deconditioning - dtr Lisa reports that pt was limited in his ability to rehab at one point in his stay at [facility], but in the last month had been walking and improving some of his physical strength with PT.     Psychosocial/Spiritual Needs:     Oriented to Spiritual Health and Social Work Services as part of Palliative Care team. SWS is following pt.[JW1.1] Referral for Spiritual Health.   Pt is Yarsanism.[JW1.2]     Decision-Making & Goals of Care:  Discussion/counseling today about goals of care/decisions:   5/9/17[JW1.1] Met with dtr Lisa and wife Nuvia in pt room. Pt had been more sedated this am, but after being put into a chair by pt, pt has periods where he opens his eyes, follows some commands, and then drifts back to sleep. Wife Nuvia informs me several times during conversation that Lisa is the family spokesperson regarding pt.  Dtr's concerns were addressed via phone call to Dr. Uribe regarding adjusting pt's seroquel, ensuring that PT is ordered for pt at discharge to memory care. Lisa and wife acknowledge that they are emotional and retell stories of the pt. Lisa reiterates that the family currently believe that pt has quality of life, and potential to improve physically.  "Reviewed their understanding of pt condition, reviewed possible options to support their wish for pt, including DNR/DNI, and/or supportive care, and/or comfort care, and artificial nutrition. Lisa and Nuvia were given information regarding CPR, breathing support, and an example polst with explanation of each care decision discussed. Lisa is leaning toward DNR/DNI with supportive care, but wants to review with her borther Loyd when he comes back from [place] to visit on Sunday. Reviewed current POLST and process to have the POLST updated at [facility], as well as how to access HOSPICE at any point for additional support.   Lisa does not want to make changes to his status until after discussion with her brother this weekend. They are open to  visit and appreciative of SWS and CC assistance with discharge plan back to [facility] when pt is stable.[JW1.2]  5/8/17 Met with dtr Lisa and wife Nuvia in pt room. They retell pt history with dementia and most recent hospitalizations. They believe pt has quality of life when he as at baseline because of his ability to interact with his 3 year old grandson when Loyd, pt's son, comes with his family from [place] every 4-6 weeks. They are hopeful that pt can recover to his baseline and return to [facility] care. Lisa states that this hospitalization for PNA was \"easier\" than the last one in January. They visit pt at [facility] every day, and dtr Lisa helps him with eating, physical activity. Lisa acknowledged that they are aware of their dad's decline over time. Lisa verbalizes that she sees pts at the [facility] that do not talk, do not have visitors, do not have any interaction with others, they are just taken care of and fed, day after day. She does not want pt to be like that when the time comes, but prior to this admission, her dad was improving in his physical strength. She, her mom and her siblings are talking about code status and care, and she can repeat " "to me the recommendations that the Hospitalists have told her about code status vs. Supportive or comfort care. \"That is a big change and we would want to call 911 and bring him back to the hospital if his breathing got bad or he was in distress\". Lisa states that she has reviewed information about cpr on the intranet, but was ok with me giving her and her mom information to review about cpr, respiratory support and decision-making. Lisa and Nuvia agreed to discuss further and review POLST tomorrow. Lisa will bring in a copy of pt's HCD.     Patient has decision-making capacity Unreliable  Patient has a Health Care Agent named in a legal Advance Directive document[JW1.1] dated 11/5/1997.[JW1.2]  Name: Nuvia Pearson, Relationship: spouse, Phone(s):[phone][JW1.1]  First Alternate: Lisa Pearson, Relationship: daughter, Phone(s): [phone](home), [phone] (cell, primary).  Second Alternate: Sarah Pearson, Relationship: daughter. Phone # not obtained.[JW1.2]        Patient has a completed health care directive available in the chart (Y/N)[JW1.1]Y[JW1.2]   Physician orders for life-sustaining treatment (POLST) form is on file  Code Status: Full code      Findings & plan of care discussed with: Lisa, dtr, and Nuvia, wife. Will follow up with Dr. Uribe[JW1.1] in am. Updated Arielle FONTAINE[JW1.3]  Follow-up plan from palliative team: Will continue to follow this pt and family for symptom management and goals of care support.  Thank you for involving us in the patient's care.      Jessica VELA, CNS  Pain Management and Palliative Care  [facility]  Pgr: [phone]    Time Spent on this Encounter   I spent[JW1.1] 75[JW1.2] minutes in assessment of the patient and discussion with the patient and family. Another[JW1.1] 20[JW1.2] minutes in review of chart, documentation and discussion with the health care team.[JW1.1]  Face to face meeting with dtr Lisa and wife Nuvia " 2:30-3:40pm.[JW1.2]    Interval History   Pt reported to be very sleepy this am and not able to be fed or partipate in therapies. This afternoon, however, PT was able to get pt into a recliner. After this, pt opened his eyes, wiggled his toes to command, and appears to be answering appropriately at times to yes/no questions. ST here to work with pt.[JW1.1] Started on neb treatments today.[JW1.2]    Review of Systems    C: POSITIVE for change in weight, fever  E/M: NEGATIVE for ear, mouth and throat problems  R: NEGATIVE for significant cough or SOB  CV: NEGATIVE for chest pain, palpitations or peripheral edema  GI: BM    Palliative Symptom Review (0=no symptom/no concern, 1=mild, 2=moderate, 3=severe):      Pain: JANELL - appears comfortable.      Fatigue: JANELL      Nausea: JANELL, eats. No apparent signs of nausea.      Constipation: 0-none      Diarrhea: 0-none      Depressive Symptoms: JANELL      Anxiety: JANELL      Drowsiness: 2-moderate      Poor Appetite: 0-none      Shortness of Breath: 0-none per observation.      Insomnia: 0-none      Other:Agitation 0-none      Overall (0 good/no concerns, 3 very poor):  2    Physical Exam   Temp:  [97.1  F (36.2  C)-101.8  F (38.8  C)] 97.1  F (36.2  C)  Pulse:  [59-68] 59  Resp:  [16-18] 16  BP: (142-177)/(54-73) 142/54  SpO2:  [86 %-94 %] 94 %  190 lbs 1.6 oz  GEN:  More alert, oriented x 1, appears comfortable, NAD.  HEENT:  Normocephalic/atraumatic, no scleral icterus, no nasal discharge, mouth moist.  CV:  RRR, S1, S2; no murmurs or other irregularities noted.  +3 DP/PT pulses bilatererally; no edema BLE.  RESP:  Clear to auscultation anteriorly bilaterally without rales/rhonchi/wheezing/retractions.  Symmetric chest rise on inhalation noted.  Normal respiratory effort.  ABD:  Rounded, soft, non-tender/non-distended.  +BS  EXT:  Edema & pulses as noted above.  CMS intact x 4.     M/S:   Non-Tender to palpation.    SKIN:  Dry to touch, no exanthems noted in the visualized areas.  "   NEURO: Symmetric strength +5/5.  Sensation appears to be intact.   There is no area of allodynia or hyperesthesia.  PAIN BEHAVIOR: Cooperative with some commands.  Psych:  Calm, cooperative with some commands, responses to yes no seem appropriate at times.     Medications     NaCl 50 mL/hr at 05/09/17 1044       ipratropium - albuterol 0.5 mg/2.5 mg/3 mL  3 mL Nebulization 4x daily     multivitamin, therapeutic with minerals  1 tablet Oral Daily     enoxaparin  40 mg Subcutaneous Q24H     trimethoprim-polymyxin b  2 drop Right Eye 4x Daily     ampicillin-sulbactam (UNASYN) IV  3 g Intravenous Q6H     DULoxetine  30 mg Oral Daily     memantine (NAMENDA) tablet 10 mg  10 mg Oral Daily     tamsulosin  0.4 mg Oral QPM     QUEtiapine (SEROquel) half-tab 12.5 mg  12.5 mg Oral Daily with lunch     QUEtiapine  25 mg Oral QAM     QUEtiapine  25 mg Oral At Bedtime     QUEtiapine  12.5 mg Oral Daily with supper       Data[JW1.1]   Results for orders placed or performed during the hospital encounter of 05/05/17 (from the past 24 hour(s))   Social Work IP Consult    Narrative    Mar Salazar, RN     5/9/2017 11:55 AM  Care Transitions Team: Following for CC, discharge planning, and   disposition.        Per chart review pt. has admission from January 2017 for PNA +   UTI, current admission, Asp. PNA.   Palli consulted for goals of care.     Per chart review pt is resident at [facility] where all services are provided for him.     Daughter, Lisa explains that staff at [facility] have been using a lift   to transfer him and he has a wheel chair.  At baseline he goes to   meals in dinning room, spends a lot of time sleeping as of last   month.    Lisa explains that in January \"he seemed to bounce back rather   quickly from his illness, he had physical therapy and actually   started walking some, just seemed more up beat\"  Lisa goes on to   explain \"when that was over he started to decline " "again.\" \"Just   less interactive and sleeping more\"     Lisa expresses understanding that she may be witnessing a   general failure to thrive and has had hospice in back of mind \"of   course\" just not quite ready to \"let go\".  Lisa explains that current goal will be [facility] with   SLP and PT to \"see how he does\"  If he is declining and not   wanting to participate or eat \"then I guess then we will need to   revisit the Hospice\"  Lisa identifies these services and   contacts for these services provided through [facility].     Lisa requests HE to be arranged for transportation when pt is   medically stable for discharge.  Pt does not have 02 at [facility].    Will continue to follow and be  In contact with dtr Lisa and   Jose Sanabria for coordination of return when medically   stable.     Mar Salazar RN BSN CTS  Care Transitions Team  [phone]     Creatinine   Result Value Ref Range    Creatinine 0.89 0.66 - 1.25 mg/dL    GFR Estimate 81 >60 mL/min/1.7m2    GFR Estimate If Black >90   GFR Calc   >60 mL/min/1.7m2   Platelet count   Result Value Ref Range    Platelet Count 308 150 - 450 10e9/L   Potassium   Result Value Ref Range    Potassium 3.4 3.4 - 5.3 mmol/L   Magnesium   Result Value Ref Range    Magnesium 2.0 1.6 - 2.3 mg/dL   Care Coordinator IP Consult    Narrative    Mar Salazar RN     5/9/2017 11:55 AM  Care Transitions Team: Following for CC, discharge planning, and   disposition.        Per chart review pt. has admission from January 2017 for PNA +   UTI, current admission, Asp. PNA.   Palli consulted for goals of care.     [facility] review [facility].     Daughter, Lisa explains that staff at [facility] have been using a lift   to transfer him and he has a wheel chair.  At baseline he goes to   meals in dinning room, spends a lot of time sleeping as of last " "  month.    Lisa explains that in January \"he seemed to bounce back rather   quickly from his illness, he had physical therapy and actually   started walking some, just seemed more up beat\"  Lisa goes on to   explain \"when that was over he started to decline again.\" \"Just   less interactive and sleeping more\"     Lisa expresses understanding that she may be witnessing a   general failure to thrive and has had hospice in back of mind \"of   course\" just not quite ready to \"let go\".  Lisa explains that current goal will be to [facility] with   [facility] and PT to \"see how he does\"  If he is declining and not   wanting to participate or eat \"then I guess then we will need to   revisit the Hospice\"  Lisa identifies these services and   contacts for these services provided through [facility].     Lisa requests HE to be arranged for transportation when pt is   medically stable for discharge.  Pt does not have 02 at [facility].    Will continue to follow and be  In contact with dtr Lisa and   Jose Sanabria for coordination of return when medically   stable.     Mar Salazar RN BSN CTS  Care Transitions Team  [phone][JW1.4]          Revision History        User Key Date/Time User Provider Type Action    > JW1.3 5/10/2017  9:41 AM Jessica Macias APRN CNS Clinical Nurse Specialist Addend     JW1.2 5/9/2017  6:41 PM Jessica Macias APRN CNS Clinical Nurse Specialist Sign     JW1.4 5/9/2017  2:17 PM Jessica Macias APRN CNS Clinical Nurse Specialist      JW1.1 5/9/2017  2:15 PM Jessica Macias APRN CNS Clinical Nurse Specialist             Progress Notes by Jessica Macias APRN CNS at 5/10/2017  9:11 AM     Author:  Jessica Macias APRN CNS Service:  Palliative Author Type:  Clinical Nurse Specialist    Filed:  5/10/2017  9:37 AM Date of Service:  5/10/2017  9:11 AM Note Created:  5/10/2017  9:11 AM    Status:  Signed :  Jessica Macias APRN CNS (Clinical Nurse Specialist) "         [facility]  Palliative Care Progress Note  Text Page     Assessment & Plan   I was asked to see the patient and family for goals of care.     Recommendations:  1. Delirium - Seroquel as per Hospitalist team. Appreciate nursing help to achieve routine with cares and activities, use sensory aides, promote sleep at night and awake during the day. Cymbalta and Namenda restarted 5/6 by Hospitalists.  2. Dyspnea - Promote good pulmonary toilet, position for comfort, breathing, oxygen as previously ordered, offer fan prn. Position for optimal safety and alertness with eating. Will hold on opioid for dyspnea prn for now. Nebs as per Hospitalists.     Goal of Care: Full code with restorative goal. Received copy of HCD 5/9/2017 - copy on chart and will have copy sent to [facility] to be scanned. Reviewed POLST from [facility] 2/24/15. Dtr Lisa and wife Nuvia have information on CPR, breathing help, and POLST options for care. Lisa wishes to discuss with brother Loyd when he is in town this weekend. They will follow up with provider at [facility] if pt is discharged before this time.      Disease Process/es & Symptoms:  Eder Pearson is a 83 year old patient admitted with symptoms of shortness of breath with fever and leukocytosis. He has been treated for aspiration PNA and confusion.      This is in the setting of anxiety, dementia, prostate cancer and chronic kidney disease. He has been hospitalized Jan, 2017 for pneumonia and UTI, and now with aspiration PNA. There is a documented unitentional weight loss of 25 pounds over the past 12 months.     The following symptoms are noted by family as concerning to pt's quality of life.  Dyspnea - improving with treatment for PNA.  Drowsiness - attributed to seroquel, pneumonia illness, changes to daily routines.  Deconditioning - dtr Lisa reports that pt was limited in his ability to rehab at one point in his stay at [facility], but in the last  month had been walking and improving some of his physical strength with PT.     Psychosocial/Spiritual Needs:     Oriented to Spiritual Health and Social Work Services as part of Palliative Care team. SWS is following pt. Referral for Spiritual Health 5/9.   Pt is Restorationist.     Decision-Making & Goals of Care:  Discussion/counseling today about goals of care/decisions:   5/10/17 No family present this am. Pt alert this am, similar to last evening. Laughed, some short verbal responses, following a few simple commands, and then drifts off to sleep. Noted period of agitation last night. POC continues to be to return to memory care with PT at discharge. Dtr Lisa and wife Nuvia would like to talk with their brother Loyd when he comes back to [place] this weekend regarding pt goals of care. HCD on chart. No change to code status or POLST at present. Lisa and Nuvia are informed of how to request a change to the POLST after discharge with the [facility] group after discharge. They are also informed how to access HOSPICE after discharge. Have been given information on CPR and Respiratory support for their reference. Appreciate SS and CC support with discharge planning. Updated Dr. Uribe.  5/9/17 Met with dtr Lisa and wife Nuvia in pt room. Pt had been more sedated this am, but after being put into a chair by pt, pt has periods where he opens his eyes, follows some commands, and then drifts back to sleep. Wife Nuvia informs me several times during conversation that Lisa is the family spokesperson regarding pt.  Dtr's concerns were addressed via phone call to Dr. Uribe regarding adjusting pt's seroquel, ensuring that PT is ordered for pt at discharge to [facility] care. Lisa and wife acknowledge that they are emotional and retell stories of the pt. Lisa reiterates that the family currently believe that pt has quality of life, and potential to improve physically. Reviewed their understanding of pt condition, reviewed possible  "options to support their wish for pt, including DNR/DNI, and/or supportive care, and/or comfort care, and artificial nutrition. Lisa and Nuvia were given information regarding CPR, breathing support, and an example polst with explanation of each care decision discussed. Lisa is leaning toward DNR/DNI with supportive care, but wants to review with her borther Loyd when he comes back from [place] to visit on Sunday. Reviewed current POLST and process to have the POLST updated at [facility], as well as how to access HOSPICE at any point for additional support.   Lisa does not want to make changes to his status until after discussion with her brother this weekend. They are open to  visit and appreciative of SWS and CC assistance with discharge plan back to [facility] when pt is stable.  5/8/17 Met with dtr Lisa and wife Nuvia in pt room. They retell pt history with dementia and most recent hospitalizations. They believe pt has quality of life when he as at baseline because of his ability to interact with his 3 year old grandson when Loyd, pt's son, comes with his family from [place] every 4-6 weeks. They are hopeful that pt can recover to his baseline and return to [facility] care. Lisa states that this hospitalization for PNA was \"easier\" than the last one in January. They visit pt at [facility] every day, and dtr Lisa helps him with eating, physical activity. Lisa acknowledged that they are aware of their dad's decline over time. Lisa verbalizes that she sees pts at the [facility] that do not talk, do not have visitors, do not have any interaction with others, they are just taken care of and fed, day after day. She does not want pt to be like that when the time comes, but prior to this admission, her dad was improving in his physical strength. She, her mom and her siblings are talking about code status and care, and she can repeat to me the recommendations that the Hospitalists have told her about " "code status vs. Supportive or comfort care. \"That is a big change and we would want to call 911 and bring him back to the hospital if his breathing got bad or he was in distress\". Lisa states that she has reviewed information about cpr on the intranet, but was ok with me giving her and her mom information to review about cpr, respiratory support and decision-making. Lisa and Nuvia agreed to discuss further and review POLST tomorrow. Lisa will bring in a copy of pt's HCD.     Patient has decision-making capacity Unreliable  Patient has a Health Care Agent named in a legal Advance Directive document dated 11/5/1997.  Name: Nuvia Pearson, Relationship: spouse, Phone(s):[phone]  First Alternate: Lisa Pearson, Relationship: daughter, Phone(s): [phone](home), [phone] (cell, primary).  Second Alternate: Sarah Pearson, Relationship: daughter. Phone # not obtained.        Patient has a completed health care directive available in the chart (Y/N)Yes. Physician orders for life-sustaining treatment (POLST) form is on file - yes.   Code Status: Full code      Findings & plan of care discussed with: Bedside RN, CC,  Dr. Uribe.  Follow-up plan from palliative team: Will continue to follow this pt and family for symptom management and goals of care support.  Thank you for involving us in the patient's care.      Jessica VELA, CNS  Pain Management and Palliative Care  [facility]  Pgr: [phone]    Time Spent on this Encounter   I spent 5 minutes in assessment of the patient and discussion with the patient and family. Another 10 minutes in review of chart, documentation and discussion with the health care team.    Interval History   Pt more awake this am. Having portable cxr. Verbal responses. Following some basic commands, then drifts off to sleep.  Noted to have episode of agitation last night. Breathing easy. No cough, but still on oxygen.     Review of Systems    C: POSITIVE for change " in weight, NEGATIVE for chills or fever.  E/M: NEGATIVE for ear, mouth and throat problems  R: NEGATIVE for significant cough or SOB  CV: NEGATIVE for chest pain, palpitations or peripheral edema  GI: BM    Palliative Symptom Review (0=no symptom/no concern, 1=mild, 2=moderate, 3=severe):      Pain: JANELL - appears comfortable.      Fatigue: JANELL      Nausea: JANELL, eats. No apparent signs of nausea.      Constipation: 0-none      Diarrhea: 0-none      Depressive Symptoms: JANELL      Anxiety: JANELL      Drowsiness: 2-moderate      Poor Appetite: 0-none      Shortness of Breath: 0-none per observation.      Insomnia: 0-none      Other:Agitation 0-none      Overall (0 good/no concerns, 3 very poor):  2    Physical Exam[JW1.1]   Temp:  [97.1  F (36.2  C)-98.8  F (37.1  C)] 98.7  F (37.1  C)  Pulse:  [61-64] 63  Resp:  [16-18] 16  BP: (126-148)/(43-62) 142/50  SpO2:  [84 %-95 %] 95 %[JW1.2]  190 lbs 1.6 oz  GEN:  More alert, oriented x 1, appears comfortable, NAD.  HEENT:  Normocephalic/atraumatic, no scleral icterus, no nasal discharge, mouth moist.  CV:  RRR, S1, S2; no murmurs or other irregularities noted.  +3 DP/PT pulses bilatererally; no edema BLE.  RESP:  Clear to auscultation anteriorly bilaterally without rales/rhonchi/wheezing/retractions.  Symmetric chest rise on inhalation noted.  Normal respiratory effort.  ABD:  Rounded, soft, non-tender/non-distended.  +BS  EXT:  Edema & pulses as noted above.  CMS intact x 4.  Edema in hands +1 bilat.  M/S:   Non-Tender to palpation.    SKIN:  Dry to touch, no exanthems noted in the visualized areas.    NEURO: Symmetric strength +5/5.  Sensation appears to be intact.   There is no area of allodynia or hyperesthesia.  PAIN BEHAVIOR: Cooperative with some commands.  Psych:  Calm, cooperative with some commands, some verbal responses and laughter.    Medications[JW1.1]        ipratropium - albuterol 0.5 mg/2.5 mg/3 mL  3 mL Nebulization 4x daily     QUEtiapine  12.5 mg Oral QAM      "multivitamin, therapeutic with minerals  1 tablet Oral Daily     enoxaparin  40 mg Subcutaneous Q24H     trimethoprim-polymyxin b  2 drop Right Eye 4x Daily     ampicillin-sulbactam (UNASYN) IV  3 g Intravenous Q6H     DULoxetine  30 mg Oral Daily     memantine (NAMENDA) tablet 10 mg  10 mg Oral Daily     tamsulosin  0.4 mg Oral QPM     QUEtiapine (SEROquel) half-tab 12.5 mg  12.5 mg Oral Daily with lunch     QUEtiapine  25 mg Oral At Bedtime     QUEtiapine  12.5 mg Oral Daily with supper[JW1.3]       Data[JW1.1]   Results for orders placed or performed during the hospital encounter of 05/05/17 (from the past 24 hour(s))   Care Coordinator IP Consult    Narrative    Mar Salazar RN     5/9/2017 11:55 AM  Care Transitions Team: Following for CC, discharge planning, and   disposition.        Per chart review pt. has admission from January 2017 for PNA +   UTI, current admission, Asp. PNA.   Palli consulted for goals of care.     [facility].     Daughter, Lisa explains that staff at [facility] have been using a lift   to transfer him and he has a wheel chair.  At baseline he goes to   meals in dinning room, spends a lot of time sleeping as of last   month.    Lisa explains that in January \"he seemed to bounce back rather   quickly from his illness, he had physical therapy and actually   started walking some, just seemed more up beat\"  Lisa goes on to   explain \"when that was over he started to decline again.\" \"Just   less interactive and sleeping more\"     Lisa expresses understanding that she may be witnessing a   general failure to thrive and has had hospice in back of mind \"of   course\" just not quite ready to \"let go\".  Lisa explains that current goal will be to [facility] with   SLP and PT to \"see how he does\"  If he is declining and not   wanting to participate or eat \"then I guess then we will need " "to   revisit the Hospice\"  Lisa identifies these services and   contacts for these services provided through [facility].     Lisa requests [facility] to be arranged for transportation when pt is   medically stable for discharge.  Pt [facility] not have 02 at [facility].    Will continue to follow and be  In contact with dtr Lisa and   Jose Sanabria for coordination of return when medically   stable.     Mar Salazar RN BSN CTS  Care Transitions Team  [phone]     CBC with platelets   Result Value Ref Range    WBC 8.5 4.0 - 11.0 10e9/L    RBC Count 2.66 (L) 4.4 - 5.9 10e12/L    Hemoglobin 8.4 (L) 13.3 - 17.7 g/dL    Hematocrit 26.6 (L) 40.0 - 53.0 %     78 - 100 fl    MCH 31.6 26.5 - 33.0 pg    MCHC 31.6 31.5 - 36.5 g/dL    RDW 17.4 (H) 10.0 - 15.0 %    Platelet Count 311 150 - 450 10e9/L   Basic metabolic panel   Result Value Ref Range    Sodium 145 (H) 133 - 144 mmol/L    Potassium 3.3 (L) 3.4 - 5.3 mmol/L    Chloride 110 (H) 94 - 109 mmol/L    Carbon Dioxide 28 20 - 32 mmol/L    Anion Gap 7 3 - 14 mmol/L    Glucose 96 70 - 99 mg/dL    Urea Nitrogen 11 7 - 30 mg/dL    Creatinine 0.83 0.66 - 1.25 mg/dL    GFR Estimate 88 >60 mL/min/1.7m2    GFR Estimate If Black >90   GFR Calc   >60 mL/min/1.7m2    Calcium 8.4 (L) 8.5 - 10.1 mg/dL   Magnesium   Result Value Ref Range    Magnesium 1.8 1.6 - 2.3 mg/dL[JW1.4]        Revision History        User Key Date/Time User Provider Type Action    > JW1.4 5/10/2017  9:37 AM Jessica Macias APRN CNS Clinical Nurse Specialist Sign     JW1.3 5/10/2017  9:29 AM Jessica Macias APRN CNS Clinical Nurse Specialist      JW1.2 5/10/2017  9:27 AM Gilberto, Jessica Courtney, APRN CNS Clinical Nurse Specialist      JW1.1 5/10/2017  9:11 AM Jessica Macias APRN CNS Clinical Nurse Specialist                   Procedure Notes     No notes of this type exist for this encounter.      Progress Notes - Therapies (Notes from 05/10/17 through 05/13/17)     No notes of " this type exist for this encounter.

## 2017-05-05 NOTE — IP AVS SNAPSHOT
"` `     [facility]: [phone]                                              INTERAGENCY TRANSFER FORM - NURSING   2017                    Hospital Admission Date: 2017  CALEB ARELLANO   : 1933  Sex: Male        Attending Provider: Cortes Anna MD     Allergies:  No Known Allergies    Infection:  None   Service:  GENERAL MEDI    Ht:  1.829 m (6' 0.01\")   Wt:  86.2 kg (190 lb 1.6 oz)   Admission Wt:  86.2 kg (190 lb 1.6 oz)    BMI:  25.78 kg/m 2   BSA:  2.09 m 2            Patient PCP Information     Provider PCP Type    [facility]      Current Code Status     Date Active Code Status Order ID Comments User Context       Prior      Code Status History     Date Active Date Inactive Code Status Order ID Comments User Context    2017 10:58 AM  Full Code [id number]  Pacheco Prince MD Outpatient    2017  5:14 PM 2017 10:58 AM Full Code [id number]  Cortes Anna MD Inpatient    2017 11:27 AM 2017  5:14 PM Full Code [id number]  Cortes Anna MD Outpatient    2017  2:05 AM 2017 11:27 AM Full Code [id number]  Rudy Grey MD Inpatient      Advance Directives        Does patient have a scanned Advance Directive/ACP document in EPIC?           Yes        Hospital Problems as of 2017              Priority Class Noted POA    * (Principal)Aspiration pneumonitis (H) Medium  2017 Yes    Acute respiratory failure with hypoxia (H) Medium  2017 Unknown    Late onset Alzheimer's disease with behavioral disturbance Medium  2017 Unknown    Acute bacterial conjunctivitis of right eye Medium  2017 Unknown    Infectious encephalopathy Medium  2017 Unknown      Non-Hospital Problems as of 2017              Priority Class Noted    Urinary frequency Medium  2017    HAP (hospital-acquired pneumonia) Medium  2017    ACP (advance care planning)   2017    "   Immunizations     Name Date      Pneumococcal 23 valent 01/19/17          END      ASSESSMENT     Discharge Profile Flowsheet     EXPECTED DISCHARGE     Last Bowel Movement  05/13/17 05/13/17 1105    Expected Discharge Date  05/13/17 05/13/17 1306   GI Signs/Symptoms  passing flatus 05/13/17 1105    DISCHARGE NEEDS ASSESSMENT     Passing flatus  -- (JANELL) 05/13/17 0122    Concerns To Be Addressed  no discharge needs identified 05/13/17 1306   COMMUNICATION ASSESSMENT      Patient/family verbalizes understanding of discharge plan recommendations?  Yes 05/13/17 1306   Patient's communication style  spoken language (English or Bilingual) 05/05/17 1317    Medical Team notified of plan?  yes 05/13/17 1306   SKIN      Readmission Within The Last 30 Days  no previous admission in last 30 days 05/13/17 1306   Inspection  Partial (identify areas NOT inspected) 05/13/17 1105    Equipment Currently Used at Home  lift device 05/13/17 1306   Skin WDL  ex 05/13/17 1105    Transportation Available  Medicaid transportation 05/13/17 1306   Skin Color/Characteristics  pale 05/13/17 1105    # of Referrals Placed by CTS  Communication hand-offs to next level of Care Providers;Scheduled Follow-up appointments 05/09/17 1449   Skin Temperature  warm 05/13/17 1105    Does Patient Need a Referral for Clinic CC  Yes 01/17/17 1153   Skin Moisture  dry 05/13/17 1105    ASSESSMENT OF FUNCTIONAL STATUS     Skin Elasticity  slow return to original state 05/13/17 1105    Prior to admission patient needed assistance with:  Medications;Meal preparation;Laundry/Housekeeping;Handling finances;Transportation;Shopping 05/13/17 1306   Skin Integrity  intact 05/13/17 1105    Assesssment of Functional Status  Not at baseline with ADL Functioning;Not at baseline with mobility 05/13/17 1306   SAFETY      GASTROINTESTINAL (ADULT,PEDIATRIC,OB)     Safety WDL  WDL 05/13/17 1105    GI WDL  WDL 05/13/17 1105   Safety Factors  bed in low position 05/13/17 1105     "All Quadrants Bowel Sounds  audible and active in all quadrants 05/13/17 1105                      Assessment WDL (Within Defined Limits) Definitions           Safety WDL     Effective: 09/28/15    Row Information: <b>WDL Definition:</b> Bed in low position, wheels locked; call light in reach; upper side rails up x 2; ID band on<br> <font color=\"gray\"><i>Item=AS safety wdl>>List=AS safety wdl>>Version=F14</i></font>      Skin WDL     Effective: 09/28/15    Row Information: <b>WDL Definition:</b> Warm; dry; intact; elastic; without discoloration; pressure points without redness<br> <font color=\"gray\"><i>Item=AS skin wdl>>List=AS skin wdl>>Version=F14</i></font>      Vitals     Vital Signs Flowsheet     VITAL SIGNS     Response to Interventions  Absence of nonverbal indicators of pain 05/11/17 1348    Temp  96.3  F (35.7  C) 05/13/17 0814   ANALGESIA SIDE EFFECTS MONITORING      Temp src  Axillary 05/13/17 0814   Side Effects Monitoring: Respiratory Quality  R 05/13/17 0850    Resp  18 05/13/17 0814   Side Effects Monitoring: Respiratory Depth  N 05/13/17 0850    Pulse  65 05/13/17 0814   Side Effects Monitoring: Sedation Level  1 05/13/17 0850    Pulse/Heart Rate Source  Monitor 05/13/17 0814   HEIGHT AND WEIGHT      BP  139/56 05/13/17 0814   Height  1.829 m (6' 0.01\") 05/09/17 1748    BP Location  Left arm 05/13/17 0334   Weight  86.2 kg (190 lb 1.6 oz) (Bed scale) 05/05/17 1725    OXYGEN THERAPY     BSA (Calculated - sq m)  2.09 05/05/17 1734    SpO2  90 % 05/13/17 0814   BMI (Calculated)  25.84 05/05/17 1734    O2 Device  None (Room air) 05/13/17 0814   SHARLENE COMA SCALE      Oxygen Delivery  2 LPM 05/12/17 0606   Best Eye Response  3-->(E3) to speech 05/07/17 1538    PAIN/COMFORT     Best Motor Response  4-->(M4) withdraws from pain 05/07/17 1538    Patient Currently in Pain  sleeping: patient not able to self report 05/13/17 1102   Best Verbal Response  2-->(V2) incomprehensible speech 05/07/17 1538    " Preferred Pain Scale  PAINAD (Pain Assessment in Advance Dementia Scale) 05/11/17 1842   [place] Coma Scale Score  9 05/07/17 1538    Patient's Stated Pain Goal  Unable to Assess 05/11/17 1842   POSITIONING      FACES Pain Rating  0-->no hurt 05/11/17 0123   Body Position  other (see comments) (repostioned as tolerated) 05/13/17 1105    PAINAD Breathing  0-->normal 05/13/17 0106   Head of Bed (HOB)  HOB at 20 degrees 05/13/17 1105    PAINAD Negative Vocalization  0-->none 05/13/17 0106   Positioning/Transfer Devices  pillows;in use 05/13/17 1105    PAINAD Facial Expression  0-->smiling or inexpressive 05/13/17 0106   Chair  Upright in chair 05/09/17 1821    PAINAD Body Language  0-->relaxed 05/13/17 0106   DAILY CARE      PAINAD Consolability  0-->no need to console 05/13/17 0106   Activity Type  activity adjusted per tolerance 05/13/17 1105    PAINAD Score  0 05/13/17 0106   Activity Level of Assistance  assistance, 2 people 05/13/17 1105    Pain Management Interventions  pain plan reviewed with patient/caregiver 05/11/17 0927   Activity Assistive Device  mechanical lift 05/13/17 1105    Pain Intervention(s)  Repositioned 05/12/17 0754                 Patient Lines/Drains/Airways Status    Active LINES/DRAINS/AIRWAYS     Name: Placement date: Placement time: Site: Days: Last dressing change:    Peripheral IV 05/11/17 Right;Lateral Upper forearm 05/11/17   0353   Upper forearm   2     Wound 01/17/17 Scrotum 01/17/17   0215   Scrotum   116             Patient Lines/Drains/Airways Status    Active PICC/CVC     None            Intake/Output Detail Report     Date Intake     Output    Shift P.O. I.V. IV Piggyback Total Total       Noc 05/11/17 2300 - 05/12/17 0659 20 -- -- 20 -- 20    Day 05/12/17 0700 - 05/12/17 1459 100 -- -- 100 -- 100    Mellisa 05/12/17 1500 - 05/12/17 2259 120 -- -- 120 -- 120    Noc 05/12/17 2300 - 05/13/17 0659 -- -- -- -- -- 0    Day 05/13/17 0700 - 05/13/17 1459 -- -- -- -- -- 0      Last Void/BM        Most Recent Value    Urine Occurrence 1 at 05/13/2017 0607    Stool Occurrence 1 at 05/13/2017 0346      Case Management/Discharge Planning     Case Management/Discharge Planning Flowsheet     REFERRAL INFORMATION     ASSESSMENT OF FUNCTIONAL STATUS      Did the Initial Social Work Assessment result in a Social Work Case?  Yes 05/13/17 1303   Prior to admission patient needed assistance with:  Medications;Meal preparation;Laundry/Housekeeping;Handling finances;Transportation;Shopping 05/13/17 1306    Admission Type  inpatient 05/13/17 1303   Assesssment of Functional Status  Not at baseline with ADL Functioning;Not at baseline with mobility 05/13/17 1306    Arrived From  skilled nursing facility 05/13/17 1303   EMPLOYMENT      Referral Source  physician 05/13/17 1303   Do you work full or part-time?  no 05/13/17 1306    New Steerage to  Clinics?  No 05/09/17 1449   Will you be able to return to your job?  no 05/13/17 1306    # of Referrals Placed by CTS  Communication hand-offs to next level of Care Providers;Scheduled Follow-up appointments 05/09/17 1449   COPING/STRESS      Reason For Consult  discharge planning 05/13/17 1303   Major Change/Loss/Stressor  none 05/05/17 1730    Record Reviewed  clinical discipline documentation;plan of care 05/13/17 1303   EXPECTED DISCHARGE       Assigned to Case  Rosalia Reilly 05/13/17 1303   Expected Discharge Date  05/13/17 05/13/17 1306    [facility]  [facility] [place]  05/13/17 1303   ASSESSMENT/CONCERNS TO BE ADDRESSED      LIVING ENVIRONMENT     Concerns To Be Addressed  no discharge needs identified 05/13/17 1306    Lives With  facility resident 05/13/17 1303   DISCHARGE PLANNING      Living Arrangements  residential facility 05/13/17 1303   Patient/family verbalizes understanding of discharge plan recommendations?  Yes 05/13/17 1306    Provides Primary Care For  no one, unable/limited ability to care for self 05/13/17 1303   Medical Team  notified of plan?  yes 05/13/17 1306    Primary Care Provided By  spouse/significant other;child(amber) (specify) (Lisa) 05/13/17 1303   Readmission Within The Last 30 Days  no previous admission in last 30 days 05/13/17 1306    Quality Of Family Relationships  supportive;helpful;involved 05/13/17 1303   Transportation Available  Medicaid transportation 05/13/17 1306    Able to Return to Prior Living Arrangements  yes 05/13/17 1303   Does Patient Need a Referral for Clinic CC  Yes 01/17/17 1153    HOME SAFETY     FINAL RESOURCES      Patient Feels Safe Living in Home?  yes 05/13/17 1303   Equipment Currently Used at Home  lift device 05/13/17 1306    ASSESSMENT OF FAMILY/SOCIAL SUPPORT     ABUSE RISK SCREEN      Marital Status   05/13/17 1303   QUESTION TO PATIENT:  Has a member of your family or a partner(now or in the past) intimidated, hurt, manipulated, or controlled you in any way?  no 05/05/17 1321    Who is your support system?  Wife;Children 05/13/17 1303   QUESTION TO PATIENT: Do you feel safe going back to the place where you are living?  yes 05/05/17 1321    Spouse's Name  Nuvia 05/13/17 1306   OBSERVATION: Is there reason to believe there has been maltreatment of a vulnerable adult (ie. Physical/Sexual/Emotional abuse, self neglect, lack of adequate food, shelter, medical care, or financial exploitation)?  no 05/05/17 1321    Description of Support System  Supportive;Involved 05/13/17 1306   (R) MENTAL HEALTH SUICIDE RISK      Support Assessment  Adequate family and caregiver support;Adequate social supports 05/13/17 1306   Are you depressed or being treated for depression?  No 05/05/17 1730    Quality of Family Relationships  supportive;involved 05/13/17 1306   HOMICIDE RISK      Communication preferences  phone;written 05/13/17 1306   Homicidal Ideation  no 05/13/17 1306

## 2017-05-05 NOTE — IP AVS SNAPSHOT
` `           [facility]: [phone]            Medication Administration Report for Eder Pearson as of 05/13/17 1439   Legend:    Given Hold Not Given Due Canceled Entry Other Actions    Time Time (Time) Time  Time-Action       Inactive    Active    Linked        Medications 05/07/17 05/08/17 05/09/17 05/10/17 05/11/17 05/12/17 05/13/17    acetaminophen (TYLENOL) tablet 650 mg  Dose: 650 mg Freq: EVERY 4 HOURS PRN Route: PO  PRN Reason: mild pain  Start: 05/05/17 1714   Admin Instructions: Alternate ibuprofen (if ordered) with acetaminophen.  Maximum acetaminophen dose from all sources = 75 mg/kg/day not to exceed 4 grams/day.     2106 (650 mg)-Given         0619 (650 mg)-Given               bisacodyl (DULCOLAX) Suppository 10 mg  Dose: 10 mg Freq: DAILY PRN Route: RE  PRN Reason: constipation  Start: 05/05/17 1714              DULoxetine (CYMBALTA) EC capsule 30 mg  Dose: 30 mg Freq: DAILY Route: PO  Start: 05/06/17 0800    (0800)-Not Given [C]       1207 (30 mg)-Given        0844 (30 mg)-Given        (0950)-Not Given [C]        0927 (30 mg)-Given        0837 (30 mg)-Given        0802 (30 mg)-Given        1210 (30 mg)-Given           enoxaparin (LOVENOX) injection 40 mg  Dose: 40 mg Freq: EVERY 24 HOURS Route: SC  Start: 05/06/17 1415   Admin Instructions: HOLD if platelet count falls below 50% of baseline or less than 100,000/ L and notify provider.     1519 (40 mg)-Given        1510 (40 mg)-Given        1447 (40 mg)-Given        1431 (40 mg)-Given        1350 (40 mg)-Given        1507 (40 mg)-Given        [ ] 1415           ipratropium - albuterol 0.5 mg/2.5 mg/3 mL (DUONEB) neb solution 3 mL  Dose: 3 mL Freq: EVERY 4 HOURS PRN Route: NEBULIZATION  PRN Reasons: wheezing,shortness of breath / dyspnea  Start: 05/11/17 1530              magnesium hydroxide (MILK OF MAGNESIA) suspension 30 mL  Dose: 30 mL Freq: DAILY PRN Route: PO  PRN Reason: constipation  Start: 05/05/17 1714   Admin  Instructions: Hold for loose stools.  This is the second step of a three step constipation treatment protocol.                 magnesium sulfate 4 g in 100 mL sterile water (premade)  Dose: 4 g Freq: EVERY 4 HOURS PRN Route: IV  PRN Reason: magnesium supplementation  Start: 05/05/17 1714   Admin Instructions: For serum Mg++ less than 1.6 mg/dL  Give 4 g and recheck magnesium level 2 hours after dose, and next AM.               melatonin tablet 1 mg  Dose: 1 mg Freq: AT BEDTIME PRN Route: PO  PRN Reason: sleep  Start: 05/05/17 1714   Admin Instructions: Do not give unless at least 6 hours of uninterrupted sleep is expected.               memantine (NAMENDA) tablet 10 mg  Dose: 10 mg Freq: DAILY Route: PO  Start: 05/06/17 0800    (0800)-Not Given [C]        0845 (10 mg)-Given        (0950)-Not Given [C]        0926 (10 mg)-Given        0837 (10 mg)-Given        0801 (10 mg)-Given        1210 (10 mg)-Given           multivitamin, therapeutic with minerals (THERA-VIT-M) tablet 1 tablet  Dose: 1 tablet Freq: DAILY Route: PO  Start: 05/06/17 1245    (0800)-Not Given [C]        0851 (1 tablet)-Given        (0950)-Not Given [C]        0926 (1 tablet)-Given        0837 (1 tablet)-Given        0802 (1 tablet)-Given        1210 (1 tablet)-Given           naloxone (NARCAN) injection 0.1-0.4 mg  Dose: 0.1-0.4 mg Freq: EVERY 2 MIN PRN Route: IV  PRN Reason: opioid reversal  Start: 05/05/17 1714   Admin Instructions: For respiratory rate LESS than or EQUAL to 8.  Partial reversal dose:  0.1 mg titrated q 2 minutes for Analgesia Side Effects Monitoring Sedation Level of 3 (frequently drowsy, arousable, drifts to sleep during conversation).Full reversal dose:  0.4 mg bolus for Analgesia Side Effects Monitoring Sedation Level of 4 (somnolent, minimal or no response to stimulation).               ondansetron (ZOFRAN-ODT) ODT tab 4 mg  Dose: 4 mg Freq: EVERY 6 HOURS PRN Route: PO  PRN Reason: nausea  Start: 05/05/17 1714   Admin  Instructions: This is Step 1 of nausea and vomiting management.  If nausea not resolved in 15 minutes, go to Step 2 prochlorperazine (COMPAZINE). Do not push through foil backing. Peel back foil and gently remove. Place on tongue immediately. Administration with liquid unnecessary              Or  ondansetron (ZOFRAN) injection 4 mg  Dose: 4 mg Freq: EVERY 6 HOURS PRN Route: IV  PRN Reasons: nausea,vomiting  Start: 05/05/17 1714   Admin Instructions: This is Step 1 of nausea and vomiting management.  If nausea not resolved in 15 minutes, go to Step 2 prochlorperazine (COMPAZINE).  Irritant.               potassium chloride (KLOR-CON) Packet 20-40 mEq  Dose: 20-40 mEq Freq: EVERY 2 HOURS PRN Route: ORAL OR FEED  PRN Reason: potassium supplementation  Start: 05/05/17 1714   Admin Instructions: Use if unable to tolerate tablets.  If Serum K+ 3.0-3.3, dose = 60 mEq po total dose (40 mEq x1 followed in 2 hours by 20 mEq x1). Recheck K+ level 4 hours after dose and the next AM.  If Serum K+ 2.5-2.9, dose = 80 mEq po total dose (40 mEq Q2H x2). Recheck K+ level 4 hours after dose and the next AM.  If Serum K+ less than 2.5, See IV order.  Dissolve packet contents in 4-8 ounces of cold water or juice.        0926 (20 mEq)-Given [C]       1430 (20 mEq)-Given [C]              potassium chloride 10 mEq in 100 mL intermittent infusion  Dose: 10 mEq Freq: EVERY 1 HOUR PRN Route: IV  PRN Reason: potassium supplementation  Start: 05/05/17 1714   Admin Instructions: Infuse via PERIPHERAL LINE or CENTRAL LINE. Use for central line replacement if patient weight less than 65 kg, if patient is on TPN with high potassium content or if unit does not stock 20 mEq bags.   If Serum K+ 3.0-3.3, dose = 10 mEq/hr x4 doses (40 mEq IV total dose). Recheck K+ level 2 hours after dose and the next AM.   If Serum K+ less than 3.0, dose = 10 mEq/hr x6 doses (60 mEq IV total dose). Recheck K+ level 2 hours after dose and the next AM.                potassium chloride 10 mEq in 100 mL intermittent infusion with 10 mg lidocaine  Dose: 10 mEq Freq: EVERY 1 HOUR PRN Route: IV  PRN Reason: potassium supplementation  Last Dose: 10 mEq (05/11/17 1636)  Start: 05/05/17 1714   Admin Instructions: Infuse via PERIPHERAL LINE. Use potassium with lidocaine for pain with peripheral administration.  If Serum K+ 3.0-3.3, dose = 10 mEq/hr x4 doses (40 mEq IV total dose). Recheck K+ level 2 hours after dose and the next AM.  If Serum K+ less than 3.0, dose = 10 mEq/hr x6 doses (60 mEq IV total dose). Recheck K+ level 2 hours after dose and the next AM.         1046 (10 mEq)-New Bag       1304 (10 mEq)-New Bag       1505 (10 mEq)-New Bag       1636 (10 mEq)-New Bag             potassium chloride 20 mEq in 50 mL intermittent infusion  Dose: 20 mEq Freq: EVERY 1 HOUR PRN Route: IV  PRN Reason: potassium supplementation  Start: 05/05/17 1714   Admin Instructions: Infuse via CENTRAL LINE Only. May need EKG if less than 65 kg or on TPN - Max rate is 0.3 mEq/kg/hr for patients not on EKG monitoring.   If Serum K+ 3.0-3.3, dose = 20 mEq/hr x2 doses (40 mEq IV total dose). Recheck K+ level 2 hours after dose and the next AM.  If Serum K+ less than 3.0, dose = 20 mEq/hr x3 doses (60 mEq IV total dose). Recheck K+ level 2 hours after dose and the next AM.               potassium chloride SA (K-DUR/KLOR-CON M) CR tablet 20-40 mEq  Dose: 20-40 mEq Freq: EVERY 2 HOURS PRN Route: PO  PRN Reason: potassium supplementation  Start: 05/05/17 1714   Admin Instructions: Use if able to take PO.   If Serum K+ 3.0-3.3, dose = 60 mEq po total dose (40 mEq x1 followed in 2 hours by 20 mEq x1). Recheck K+ level 4 hours after dose and the next AM.  If Serum K+ 2.5-2.9, dose = 80 mEq po total dose (40 mEq Q2H x2). Recheck K+ level 4 hours after dose and the next AM.  If Serum K+ less than 2.5, See IV order.  DO NOT CRUSH               prochlorperazine (COMPAZINE) injection 5 mg  Dose: 5 mg Freq: EVERY 6  HOURS PRN Route: IV  PRN Reasons: nausea,vomiting  Start: 05/05/17 1714   Admin Instructions: This is Step 2 of nausea and vomiting management.   If nausea not resolved in 15 minutes, give metoclopramide (REGLAN) if ordered (step 3 of nausea and vomiting management)              Or  prochlorperazine (COMPAZINE) tablet 5 mg  Dose: 5 mg Freq: EVERY 6 HOURS PRN Route: PO  PRN Reason: vomiting  Start: 05/05/17 1714   Admin Instructions: This is Step 2 of nausea and vomiting management.   If nausea not resolved in 15 minutes, give metoclopramide (REGLAN) if ordered (step 3 of nausea and vomiting management)              Or  prochlorperazine (COMPAZINE) Suppository 12.5 mg  Dose: 12.5 mg Freq: EVERY 12 HOURS PRN Route: RE  PRN Reasons: nausea,vomiting  Start: 05/05/17 1714   Admin Instructions: This is Step 2 of nausea and vomiting management.   If nausea not resolved in 15 minutes, give metoclopramide (REGLAN) if ordered (step 3 of nausea and vomiting management)               QUEtiapine (SEROquel) half-tab 12.5 mg  Dose: 12.5 mg Freq: EVERY 12 HOURS PRN Route: PO  PRN Comment: agitation  Start: 05/05/17 1714        1833 (12.5 mg)-Given             QUEtiapine (SEROquel) tablet 25 mg  Dose: 25 mg Freq: AT BEDTIME Route: PO  Start: 05/05/17 2200    2106 (25 mg)-Given        (2216)-Not Given        2154 (25 mg)-Given        2222 (25 mg)-Given        2204 (25 mg)-Given        2203 (25 mg)-Given        [ ] 2200           senna-docusate (SENOKOT-S;PERICOLACE) 8.6-50 MG per tablet 1-2 tablet  Dose: 1-2 tablet Freq: 2 TIMES DAILY PRN Route: PO  PRN Comment: constipation   Start: 05/05/17 1714   Admin Instructions: If no bowel movement in 24 hours, increase to 2 tablets PO BID.  Hold for loose stools.   This is the first step of a three step constipation treatment protocol.               sodium chloride (PF) 0.9% PF flush 3 mL  Dose: 3 mL Freq: EVERY 8 HOURS Route: IK  Start: 05/11/17 0130        (0316)-Not Given       1045 (3  mL)-Given       (1651)-Not Given       (2356)-Not Given [C]        0803 (3 mL)-Given       1630 (3 mL)-Given        0102 (3 mL)-Given       (1211)-Not Given       [ ] 1600           tamsulosin (FLOMAX) capsule 0.4 mg  Dose: 0.4 mg Freq: EVERY EVENING Route: PO  Start: 05/05/17 2000   Admin Instructions: Administer 30 minutes after the same meal each day.  Capsules should be swallowed whole; do not crush chew or open.     2106 (0.4 mg)-Given        2105 (0.4 mg)-Given        2155 (0.4 mg)-Given [C]        2040 (0.4 mg)-Given        2203 (0.4 mg)-Given        2202 (0.4 mg)-Given        [ ] 2000           trimethoprim-polymyxin b (POLYTRIM) ophthalmic solution 2 drop  Dose: 2 drop Freq: 4 TIMES DAILY Route: RIGHT EYE  Start: 05/05/17 2000   Admin Instructions: MAKE SURE THIS TRANSFERS WITH THE PATIENT TO THE FLOOR PLEASE!     0926 (2 drop)-Given              1609 (2 drop)-Given       2106 (2 drop)-Given        0856 (2 drop)-Given       1259 (2 drop)-Given       1657 (2 drop)-Given       2125 (2 drop)-Given        0906 (2 drop)-Given       1225 (2 drop)-Given       1623 (2 drop)-Given       (2000)-Not Given [C]        0927 (2 drop)-Given       1228 (2 drop)-Given       1533 (2 drop)-Given [C]       1936 (2 drop)-Given        0830 (2 drop)-Given       1305 (2 drop)-Given       1600 (2 drop)-Given       2206 (2 drop)-Given        0805 (2 drop)-Given       1224 (2 drop)-Given       1630 (2 drop)-Given       2206 (2 drop)-Given        (1211)-Not Given       1212 (2 drop)-Given       [ ] 1600       [ ] 2000          Completed Medications  Medications 05/07/17 05/08/17 05/09/17 05/10/17 05/11/17 05/12/17 05/13/17         Dose: 20 mg Freq: ONCE Route: IV  Start: 05/10/17 1445   End: 05/10/17 1559       1559 (20 mg)-Given             Discontinued Medications  Medications 05/07/17 05/08/17 05/09/17 05/10/17 05/11/17 05/12/17 05/13/17         Dose: 3 mL Freq: 4 TIMES DAILY Route: NEBULIZATION  Start: 05/09/17 1200   End: 05/11/17  1527      (1132)-Not Given       1551 (3 mL)-Given       1938 (3 mL)-Given        0724 (3 mL)-Given       1151 (3 mL)-Given       1555 (3 mL)-Given       2009 (3 mL)-Given        0716 (3 mL)-Given       1205 (3 mL)-Given       1513 (3 mL)-Given       1527-Med Discontinued           Dose: 3.375 g Freq: EVERY 6 HOURS Route: IV  Indications of Use: ASPIRATION PNEUMONIA  Last Dose: 3.375 g (05/11/17 1838)  Start: 05/10/17 1200   End: 05/11/17 2315       1228 (3.375 g)-New Bag       1727 (3.375 g)-New Bag        0039 (3.375 g)-New Bag       0607 (3.375 g)-New Bag       1156 (3.375 g)-New Bag       1838 (3.375 g)-New Bag       2315-Med Discontinued

## 2017-05-05 NOTE — IP AVS SNAPSHOT
MRN:[id number]                      After Visit Summary   5/5/2017    Eder Pearson    MRN: [id number]           Thank you!     Thank you for choosing [facility] for your care. Our goal is always to provide you with excellent care. Hearing back from our patients is one way we can continue to improve our services. Please take a few minutes to complete the written survey that you may receive in the mail after you visit. If you would like to speak to someone directly about your visit please contact Patient Relations at [phone]. Thank you!          Patient Information     Date Of Birth          7/9/1933        Designated Caregiver       Most Recent Value    Caregiver    Will someone help with your care after discharge? yes    Name of designated caregiver Lisa    Phone number of caregiver [phone]    Caregiver address [address] [place] [address]      About your hospital stay     You were admitted on:  May 5, 2017 You last received care in the:  [facility]    You were discharged on:  May 13, 2017       Who to Call     For medical emergencies, please call 911.  For non-urgent questions about your medical care, please call your primary care provider or clinic, None          Attending Provider     Provider Specialty    Pam Ervin MD Emergency Medicine    [facility], Cortes Ackerman MD Internal Medicine       Primary Care Provider Fax #    [facility] [phone]       [address] [place] [id number]        After Care Instructions     Activity - Up with nursing assistance           Advance Diet as Tolerated       Follow this diet upon discharge:       Snacks/Supplements Adult: Other - Please comment; Magic shake; Between Meals      Snacks/Supplements Adult: Magic Cup; With Meals      Combination Diet Dysphagia Diet Level 2: Mechan Altered; Nectar Thickened Liquids (pre-thickened or use  instant food thickener)            General info for SNF       Length of Stay Estimate: Long term care.  Condition at Discharge: Stable  Level of care:skilled   Rehabilitation Potential: Poor  Admission H&P remains valid and up-to-date: Yes  Recent Chemotherapy: N/A  Use Nursing Home Standing Orders: Yes            Mantoux instructions       Give two-step Mantoux (PPD) Per Facility Policy Yes                  Follow-up Appointments     Follow Up and recommended labs and tests       Follow up with Nursing home physician per routine.                  Additional Services     Physical Therapy Adult Consult       Evaluate and treat as clinically indicated.    Reason:  Work towards previous baseline            Speech Language Path Adult Consult       Evaluate and treat as clinically indicated.    Reason:  Newly identified aspiration.                  Further instructions from your care team       RESUMPTION OF HOME CARE SERVICES PT/OT and SLP     Speech Therapy has been following you while you have been in the hospital they have Recommended a continue dysphagia diet 2 and nectar thick liquids with 1:1 supervision. Pt should be fully upright and alert for all PO, take small single sips/bties, alternate consistencies, and pace self. Hold PO should pt demonstrate overt s/sx of aspiration or if pt is not alert enough to safely take PO.   Speech therapy to follow pt at [facility]           Pending Results     No orders found from 5/3/2017 to 5/6/2017.            Statement of Approval     Ordered          05/13/17 1059  I have reviewed and agree with all the recommendations and orders detailed in this document.  EFFECTIVE NOW     Approved and electronically signed by:  Pacheco Prince MD             Admission Information     Date & Time Provider Department Dept. Phone    5/5/2017 Cortes Anna MD [facility] [phone]      Your Vitals Were     Blood Pressure Pulse Temperature Respirations  "Height Weight    139/56 65 96.3  F (35.7  C) (Axillary) 18 1.829 m (6' 0.01\") 86.2 kg (190 lb 1.6 oz)    Pulse Oximetry BMI (Body Mass Index)                90% 25.78 kg/m2          EARTHTORY Information     EARTHTORY lets you send messages to your doctor, view your test results, renew your prescriptions, schedule appointments and more. To sign up, go to [email or web address] . Click on \"Log in\" on the left side of the screen, which will take you to the Welcome page. Then click on \"Sign up Now\" on the right side of the page.     You will be asked to enter the access code listed below, as well as some personal information. Please follow the directions to create your username and password.     Your access code is: [email or web address]  Expires: 8/10/2017 10:21 AM     Your access code will  in 90 days. If you need help or a new code, please call your [facility] clinic or [phone].        Care EveryWhere ID     This is your Care EveryWhere ID. This could be used by other organizations to access your [facility] medical records  [id number]           Review of your medicines      CONTINUE these medicines which may have CHANGED, or have new prescriptions. If we are uncertain of the size of tablets/capsules you have at home, strength may be listed as something that might have changed.        Dose / Directions    * SEROQUEL 25 MG tablet   This may have changed:  Another medication with the same name was removed. Continue taking this medication, and follow the directions you see here.   Generic drug:  QUEtiapine        Dose:  25 mg   Take 25 mg by mouth At Bedtime   Refills:  0       * SEROQUEL 25 MG tablet   This may have changed:  Another medication with the same name was removed. Continue taking this medication, and follow the directions you see here.   Generic drug:  QUEtiapine        Dose:  12.5 mg   Take 12.5 mg by mouth every 12 hours as needed   Refills:  0       * Notice:  This list has 2 medication(s) that are the same " as other medications prescribed for you. Read the directions carefully, and ask your doctor or other care provider to review them with you.      CONTINUE these medicines which have NOT CHANGED        Dose / Directions    * acetaminophen 500 MG tablet   Commonly known as:  TYLENOL        Dose:  1000 mg   Take 1,000 mg by mouth 2 times daily , at 0700 & 1700   Refills:  0       * acetaminophen 500 MG tablet   Commonly known as:  TYLENOL        Dose:  1000 mg   Take 1,000 mg by mouth daily as needed for mild pain   Refills:  0       bisacodyl 10 MG Suppository   Commonly known as:  DULCOLAX        Dose:  10 mg   Place 10 mg rectally daily as needed for constipation   Refills:  0       DULoxetine 30 MG EC capsule   Commonly known as:  CYMBALTA        Dose:  30 mg   Take 30 mg by mouth daily   Refills:  0       FLOMAX 0.4 MG capsule   Generic drug:  tamsulosin        Dose:  0.4 mg   Take 0.4 mg by mouth At Bedtime   Refills:  0       menthol-zinc oxide 0.44-20.625 % Oint ointment   Commonly known as:  CALMOSEPTINE        Apply topically every 8 hours as needed for skin protection   Refills:  0       miconazole with skin protectant 2 % Crea cream        Apply topically every 8 hours Apply to buttocks topically every shift for barrier cream with each toileting   Refills:  0       NAMENDA PO        Dose:  10 mg   Take 10 mg by mouth daily   Refills:  0       NATURAL FIBER PO        Dose:  1 tsp.   Take 1 tsp. by mouth daily   Refills:  0       phenylephrine-shark liver oil-mineral oil-petrolatum 0.25-14-74.9 % rectal ointment   Commonly known as:  PREPARATION H        Place rectally daily as needed for hemorrhoids   Refills:  0       senna-docusate 8.6-50 MG per tablet   Commonly known as:  SENOKOT-S;PERICOLACE        Dose:  1 tablet   Take 1 tablet by mouth every 12 hours as needed for constipation   Refills:  0       * sodium chloride 0.65 % nasal spray   Commonly known as:  OCEAN        Dose:  2 spray   Spray 2 sprays  into both nostrils 2 times daily , at 0900 & 2100   Refills:  0       * sodium chloride 0.65 % nasal spray   Commonly known as:  OCEAN        Dose:  2 spray   Spray 2 sprays into both nostrils every 8 hours as needed for congestion   Refills:  0       VITAMIN B 12 PO        Dose:  1000 mcg   Take 1,000 mcg by mouth daily   Refills:  0       VITAMIN D3 PO        Dose:  2000 Units   Take 2,000 Units by mouth daily   Refills:  0       * Notice:  This list has 4 medication(s) that are the same as other medications prescribed for you. Read the directions carefully, and ask your doctor or other care provider to review them with you.             Protect others around you: Learn how to safely use, store and throw away your medicines at www.disposemymeds.org.             Medication List: This is a list of all your medications and when to take them. Check marks below indicate your daily home schedule. Keep this list as a reference.      Medications           Morning Afternoon Evening Bedtime As Needed    * acetaminophen 500 MG tablet   Commonly known as:  TYLENOL   Take 1,000 mg by mouth 2 times daily , at 0700 & 1700   Last time this was given:  650 mg on 5/9/2017  6:19 AM                                * acetaminophen 500 MG tablet   Commonly known as:  TYLENOL   Take 1,000 mg by mouth daily as needed for mild pain   Last time this was given:  650 mg on 5/9/2017  6:19 AM                                bisacodyl 10 MG Suppository   Commonly known as:  DULCOLAX   Place 10 mg rectally daily as needed for constipation                                DULoxetine 30 MG EC capsule   Commonly known as:  CYMBALTA   Take 30 mg by mouth daily   Last time this was given:  30 mg on 5/13/2017 12:10 PM                                FLOMAX 0.4 MG capsule   Take 0.4 mg by mouth At Bedtime   Last time this was given:  0.4 mg on 5/12/2017 10:02 PM   Generic drug:  tamsulosin                                menthol-zinc oxide 0.44-20.625 % Oint  ointment   Commonly known as:  CALMOSEPTINE   Apply topically every 8 hours as needed for skin protection                                miconazole with skin protectant 2 % Crea cream   Apply topically every 8 hours Apply to buttocks topically every shift for barrier cream with each toileting                                NAMENDA PO   Take 10 mg by mouth daily   Last time this was given:  10 mg on 5/13/2017 12:10 PM                                NATURAL FIBER PO   Take 1 tsp. by mouth daily                                phenylephrine-shark liver oil-mineral oil-petrolatum 0.25-14-74.9 % rectal ointment   Commonly known as:  PREPARATION H   Place rectally daily as needed for hemorrhoids                                senna-docusate 8.6-50 MG per tablet   Commonly known as:  SENOKOT-S;PERICOLACE   Take 1 tablet by mouth every 12 hours as needed for constipation                                * SEROQUEL 25 MG tablet   Take 25 mg by mouth At Bedtime   Last time this was given:  25 mg on 5/12/2017 10:03 PM   Generic drug:  QUEtiapine                                * SEROQUEL 25 MG tablet   Take 12.5 mg by mouth every 12 hours as needed   Last time this was given:  25 mg on 5/12/2017 10:03 PM   Generic drug:  QUEtiapine                                * sodium chloride 0.65 % nasal spray   Commonly known as:  OCEAN   Spray 2 sprays into both nostrils 2 times daily , at 0900 & 2100                                * sodium chloride 0.65 % nasal spray   Commonly known as:  OCEAN   Spray 2 sprays into both nostrils every 8 hours as needed for congestion                                VITAMIN B 12 PO   Take 1,000 mcg by mouth daily                                VITAMIN D3 PO   Take 2,000 Units by mouth daily                                * Notice:  This list has 6 medication(s) that are the same as other medications prescribed for you. Read the directions carefully, and ask your doctor or other care provider to review them  with you.

## 2017-05-05 NOTE — LETTER
Transition Communication Hand-off for Care Transitions to Next Level of Care Provider    Name: Eder Pearson  MRN #: [id number]  Primary Care Provider: [facility]     Primary Clinic: [address] [place], [place] [id number]  Primary Care Clinic Name: [facility]   Reason for Hospitalization:  Delirium [R41.0]  PNA (pneumonia) [J18.9]  Acute bacterial conjunctivitis of right eye [H10.31]  Admit Date/Time: 5/5/2017  1:13 PM  Discharge Date: 5/13/17  Payor Source: Payor: MEDICARE / Plan: MEDICARE / Product Type: Medicare /     Readmission Assessment Measure (EDSON) Risk Score/category: Elevated    Plan of Care Goals/Milestone Events:   Patient Goals:   Medical Goals   Short-term Follow soft mechanical with  nectar-thickened liquids   Long-term Follow up with the [facility] physician per  routine.         Reason for Communication Hand-off Referral: Admission diagnoses: PN    Discharge Plan:  Discharge Plan:       Most Recent Value    Disposition Comments N/A    Concerns To Be Addressed no discharge needs identified           Concern for non-adherence with plan of care:   Y/N No  Discharge Needs Assessment:  Needs       Most Recent Value    Equipment Currently Used at Home lift device    Transportation Available Medicaid transportation    # of Referrals Placed by [facility] Communication hand-offs to next level of Care Providers          Already enrolled in Tele-monitoring program and name of program:  n/a  Follow-up specialty is recommended: No    Follow-up plan:  No future appointments.    Any outstanding tests or procedures:  n/a    Referrals     Future Labs/Procedures    Physical Therapy Adult Consult     Comments:    Evaluate and treat as clinically indicated.    Reason:  Work towards previous baseline    Speech Language Path Adult Consult     Comments:    Evaluate and treat as clinically indicated.    Reason:  Newly identified aspiration.            Key Recommendations:  Follow soft  mechanical with nectar-thickened liquids/ Resume usual activity, (pivot with assist)    Mechelle Ferro, RN, BSN, PHN, CTS  Care Transitions Specialist  [facility]    AVS/Discharge Summary is the source of truth; this is a helpful guide for improved communication of patient story

## 2017-05-06 ENCOUNTER — APPOINTMENT (OUTPATIENT)
Dept: OCCUPATIONAL THERAPY | Facility: CLINIC | Age: 82
DRG: 871 | End: 2017-05-06
Attending: INTERNAL MEDICINE
Payer: MEDICARE

## 2017-05-06 ENCOUNTER — APPOINTMENT (OUTPATIENT)
Dept: SPEECH THERAPY | Facility: CLINIC | Age: 82
DRG: 871 | End: 2017-05-06
Payer: MEDICARE

## 2017-05-06 LAB
ANION GAP SERPL CALCULATED.3IONS-SCNC: 7 MMOL/L (ref 3–14)
BACTERIA SPEC CULT: NO GROWTH
BASOPHILS # BLD AUTO: 0 10E9/L (ref 0–0.2)
BASOPHILS NFR BLD AUTO: 0.3 %
BUN SERPL-MCNC: 18 MG/DL (ref 7–30)
CALCIUM SERPL-MCNC: 8.7 MG/DL (ref 8.5–10.1)
CHLORIDE SERPL-SCNC: 106 MMOL/L (ref 94–109)
CO2 SERPL-SCNC: 28 MMOL/L (ref 20–32)
CREAT SERPL-MCNC: 0.92 MG/DL (ref 0.66–1.25)
DIFFERENTIAL METHOD BLD: ABNORMAL
EOSINOPHIL # BLD AUTO: 0.1 10E9/L (ref 0–0.7)
EOSINOPHIL NFR BLD AUTO: 0.7 %
ERYTHROCYTE [DISTWIDTH] IN BLOOD BY AUTOMATED COUNT: 17.4 % (ref 10–15)
GFR SERPL CREATININE-BSD FRML MDRD: 78 ML/MIN/1.7M2
GLUCOSE SERPL-MCNC: 88 MG/DL (ref 70–99)
HCT VFR BLD AUTO: 29.6 % (ref 40–53)
HGB BLD-MCNC: 9.5 G/DL (ref 13.3–17.7)
IMM GRANULOCYTES # BLD: 0 10E9/L (ref 0–0.4)
IMM GRANULOCYTES NFR BLD: 0.4 %
LYMPHOCYTES # BLD AUTO: 1.5 10E9/L (ref 0.8–5.3)
LYMPHOCYTES NFR BLD AUTO: 15.4 %
Lab: NORMAL
MCH RBC QN AUTO: 32 PG (ref 26.5–33)
MCHC RBC AUTO-ENTMCNC: 32.1 G/DL (ref 31.5–36.5)
MCV RBC AUTO: 100 FL (ref 78–100)
MICRO REPORT STATUS: NORMAL
MONOCYTES # BLD AUTO: 0.9 10E9/L (ref 0–1.3)
MONOCYTES NFR BLD AUTO: 8.7 %
NEUTROPHILS # BLD AUTO: 7.5 10E9/L (ref 1.6–8.3)
NEUTROPHILS NFR BLD AUTO: 74.5 %
NRBC # BLD AUTO: 0 10*3/UL
NRBC BLD AUTO-RTO: 0 /100
PLATELET # BLD AUTO: 215 10E9/L (ref 150–450)
POTASSIUM SERPL-SCNC: 3.7 MMOL/L (ref 3.4–5.3)
RBC # BLD AUTO: 2.97 10E12/L (ref 4.4–5.9)
SODIUM SERPL-SCNC: 141 MMOL/L (ref 133–144)
SPECIMEN SOURCE: NORMAL
WBC # BLD AUTO: 10 10E9/L (ref 4–11)

## 2017-05-06 PROCEDURE — 85025 COMPLETE CBC W/AUTO DIFF WBC: CPT | Performed by: INTERNAL MEDICINE

## 2017-05-06 PROCEDURE — 40000225 ZZH STATISTIC SLP WARD VISIT: Performed by: SPEECH-LANGUAGE PATHOLOGIST

## 2017-05-06 PROCEDURE — 97530 THERAPEUTIC ACTIVITIES: CPT | Mod: GO | Performed by: OCCUPATIONAL THERAPIST

## 2017-05-06 PROCEDURE — 92610 EVALUATE SWALLOWING FUNCTION: CPT | Mod: GN | Performed by: SPEECH-LANGUAGE PATHOLOGIST

## 2017-05-06 PROCEDURE — 25000132 ZZH RX MED GY IP 250 OP 250 PS 637: Mod: GY | Performed by: INTERNAL MEDICINE

## 2017-05-06 PROCEDURE — A9270 NON-COVERED ITEM OR SERVICE: HCPCS | Mod: GY | Performed by: INTERNAL MEDICINE

## 2017-05-06 PROCEDURE — 36415 COLL VENOUS BLD VENIPUNCTURE: CPT | Performed by: INTERNAL MEDICINE

## 2017-05-06 PROCEDURE — 25000125 ZZHC RX 250: Performed by: INTERNAL MEDICINE

## 2017-05-06 PROCEDURE — 40000133 ZZH STATISTIC OT WARD VISIT: Performed by: OCCUPATIONAL THERAPIST

## 2017-05-06 PROCEDURE — 97166 OT EVAL MOD COMPLEX 45 MIN: CPT | Mod: GO | Performed by: OCCUPATIONAL THERAPIST

## 2017-05-06 PROCEDURE — 99233 SBSQ HOSP IP/OBS HIGH 50: CPT | Performed by: INTERNAL MEDICINE

## 2017-05-06 PROCEDURE — 12000000 ZZH R&B MED SURG/OB

## 2017-05-06 PROCEDURE — 25000128 H RX IP 250 OP 636: Performed by: INTERNAL MEDICINE

## 2017-05-06 PROCEDURE — 80048 BASIC METABOLIC PNL TOTAL CA: CPT | Performed by: INTERNAL MEDICINE

## 2017-05-06 RX ORDER — MULTIPLE VITAMINS W/ MINERALS TAB 9MG-400MCG
1 TAB ORAL DAILY
Status: DISCONTINUED | OUTPATIENT
Start: 2017-05-06 | End: 2017-05-13 | Stop reason: HOSPADM

## 2017-05-06 RX ADMIN — TAMSULOSIN HYDROCHLORIDE 0.4 MG: 0.4 CAPSULE ORAL at 21:04

## 2017-05-06 RX ADMIN — POLYMYXIN B SULFATE AND TRIMETHOPRIM 2 DROP: 1; 10000 SOLUTION OPHTHALMIC at 21:05

## 2017-05-06 RX ADMIN — AMPICILLIN SODIUM AND SULBACTAM SODIUM 3 G: 2; 1 INJECTION, POWDER, FOR SOLUTION INTRAMUSCULAR; INTRAVENOUS at 18:04

## 2017-05-06 RX ADMIN — SODIUM CHLORIDE: 9 INJECTION, SOLUTION INTRAVENOUS at 22:41

## 2017-05-06 RX ADMIN — POLYMYXIN B SULFATE AND TRIMETHOPRIM 2 DROP: 1; 10000 SOLUTION OPHTHALMIC at 18:05

## 2017-05-06 RX ADMIN — AMPICILLIN SODIUM AND SULBACTAM SODIUM 3 G: 2; 1 INJECTION, POWDER, FOR SOLUTION INTRAMUSCULAR; INTRAVENOUS at 00:11

## 2017-05-06 RX ADMIN — AMPICILLIN SODIUM AND SULBACTAM SODIUM 3 G: 2; 1 INJECTION, POWDER, FOR SOLUTION INTRAMUSCULAR; INTRAVENOUS at 06:17

## 2017-05-06 RX ADMIN — ACETAMINOPHEN 650 MG: 325 TABLET, FILM COATED ORAL at 21:04

## 2017-05-06 RX ADMIN — AMPICILLIN SODIUM AND SULBACTAM SODIUM 3 G: 2; 1 INJECTION, POWDER, FOR SOLUTION INTRAMUSCULAR; INTRAVENOUS at 12:31

## 2017-05-06 RX ADMIN — AMPICILLIN SODIUM AND SULBACTAM SODIUM 3 G: 2; 1 INJECTION, POWDER, FOR SOLUTION INTRAMUSCULAR; INTRAVENOUS at 23:54

## 2017-05-06 RX ADMIN — QUETIAPINE FUMARATE 25 MG: 25 TABLET, FILM COATED ORAL at 21:04

## 2017-05-06 NOTE — PLAN OF CARE
PT evaluation attempted 3 times throughout the day. Pt unable to arouse, cooperate or with nursing cares. Will attempt again tomorrow.

## 2017-05-06 NOTE — PLAN OF CARE
Problem: Goal Outcome Summary  Goal: Goal Outcome Summary  Outcome: No Change  Disoriented x4.  Does answer a few questions, but not all.  Can be hard to understand at times-per daughter.  Repositions with Ax2, otherwise lift transfers.  Incontinent of bowel and bladder.  Temp of 100.6 this evening.  On IV Unasyn.  Lungs coarse.  Remains on 2L.  NPO until speech evaluation tomorrow.  Yellowish drainage from right eye-has ATB drops ordered.  Daughter reports that when he is sick he is a feeder.  Can be resistive to cares at times.  Will continue to monitor.

## 2017-05-06 NOTE — PROGRESS NOTES
05/06/17 1216   Quick Adds   Type of Visit Initial Occupational Therapy Evaluation   Living Environment   Lives With facility resident   Living Arrangements (memory care)   Home Accessibility no concerns   Self-Care   Usual Activity Tolerance fair   Current Activity Tolerance poor   Activity/Exercise/Self-Care Comment Per hospitalist note daughter stated pt. had been making improvements with therapy and tolerating increased transfers and not relying on lift as much.  Daughter wanting to trial therapies to regain function and increased independence with transfers.   Functional Level Prior   Ambulation 4-->completely dependent   Transferring 3-->assistive equipment and person   Toileting 4-->completely dependent   Bathing 4-->completely dependent   Dressing 4-->completely dependent   Eating 3-->assistive equipment and person   Communication 2-->difficulty understanding and speaking (not related to language barrier)   Swallowing 2-->difficulty swallowing liquids/foods   Cognition 2 - difficulty with organizing thoughts   Fall history within last six months yes   Number of times patient has fallen within last six months 1   Which of the above functional risks had a recent onset or change? ambulation;transferring;cognition;fall history   Prior Functional Level Comment Per notes appears pt. uses lift at baseline but after recent TCU and therapy had been making progress with transfers   General Information   Onset of Illness/Injury or Date of Surgery - Date 05/06/17   Referring Physician Sadi Anna   Patient/Family Goals Statement increase tolerance and independence with transfers   Additional Occupational Profile Info/Pertinent History of Current Problem 83 year old male with PMH including anxiety, dementia who presents from his memory care facility with shortness of breath. His daughter accompanied him to the ER and provides much of the history as the patient is disoriented. He has been somewhat weaker than  usual and cannot eat on his own for the past few days which is a change for him. It sounds as though he was coughing while eating a meal a couple of days ago and there was concern for aspiration. He has had some discharge from his right eye. This morning he did have a fever and was coughing more with some purulent sputum and also more creamy discharge from his right eye.  Per daughter had been making improvements with therapies and transferring and ambulating with assist.  Notes also indicate behavior difficulties at memory care.   Precautions/Limitations fall precautions;oxygen therapy device and L/min   General Observations Pt. sleeping upon arrival and unintelligble talking and unable to fully keep eyes open.   Cognitive Status Examination   Orientation not oriented to person, place or time   Level of Consciousness lethargic/somnolent   Able to Follow Commands unable/difficult to assess   Personal Safety (Cognitive) severe impairment;unaware of functional deficits   Memory impaired   Cognitive Comment Pt. unable to follow commands and keep eyes open for session.   Visual Perception   Visual Perception Comments R eye swollen and infected   Sensory Examination   Sensory Comments Tone in UE and LE body and needing increased tactile to relax and pillows to support position   Pain Assessment   Patient Currently in Pain No   Posture   Posture forward head position   Posture Comments knees and arms in bent position (sitting in a chair position)   Range of Motion (ROM)   ROM Comment limited in tone in R and L UE   Strength   Strength Comments limited in tone   Hand Strength   Hand Strength Comments assist to place hand to hold rail or support while seated   Muscle Tone Assessment   Muscle Tone Quick Adds Bilateral upper extremities;Bilateral lower extremities   Muscle Tone Assessment - Bilateral Upper Extremities moderately increased tone   Muscle Tone Assessment - Bilateral Lower Extremities moderately increased tone    Coordination   Coordination Comments spontaneously able to reach for face or shirt   Mobility   Bed Mobility Comments max A x 2   Transfer Skills   Transfer Comments lift   Transfer Skill: Toilet Transfer   Level of [place]: Toilet dependent (less than 25% patients effort)   Balance   Balance Comments unable to maintain without assist x 1-2   Bathing   Level of [place] dependent (less than 25% patients effort)   Upper Body Dressing   Level of [place]: Dress Upper Body dependent (less than 25% patients effort)   Lower Body Dressing   Level of [place]: Dress Lower Body dependent (less than 25% patients effort)   Toileting   Level of [place]: Toilet dependent (less than 25% patients effort)   Activities of Daily Living Analysis   Impairments Contributing to Impaired Activities of Daily Living balance impaired;cognition impaired;coordination impaired;muscle tone abnormal;ROM decreased;strength decreased   General Therapy Interventions   Planned Therapy Interventions ADL retraining;stretching;progressive activity/exercise   Clinical Impression   Criteria for Skilled Therapeutic Interventions Met yes, treatment indicated   OT Diagnosis impaired independence and participation in ADLs   Influenced by the following impairments decreased strength/endurance and direction following/attention for ADLs   Assessment of Occupational Performance 3-5 Performance Deficits   Identified Performance Deficits decreased attention to tasks, decreased reaching for grooming/hygiene, decreased self feeding, decreased standing for tasks, decreased functional transfers   Clinical Decision Making (Complexity) Moderate complexity   Therapy Frequency (3-5 times/wk)   Predicted Duration of Therapy Intervention (days/wks) 5 days   Anticipated Discharge Disposition Long Term Care Facility   Risks and Benefits of Treatment have been explained. Yes   Patient, Family & other staff in agreement with plan of care Yes   [place]  "[facility] TM \"6 Clicks\"   2016, Trustees of [facility], under license to [employer].  All rights reserved.   6 Clicks Short Forms Daily Activity Inpatient Short Form   [facility]  \"6 Clicks\" Daily Activity Inpatient Short Form   1. Putting on and taking off regular lower body clothing? 1 - Total   2. Bathing (including washing, rinsing, drying)? 1 - Total   3. Toileting, which includes using toilet, bedpan or urinal? 1 - Total   4. Putting on and taking off regular upper body clothing? 1 - Total   5. Taking care of personal grooming such as brushing teeth? 1 - Total   6. Eating meals? 1 - Total   Daily Activity Raw Score (Score out of 24.Lower scores equate to lower levels of function) 6   Total Evaluation Time   Total Evaluation Time (Minutes) 8     "

## 2017-05-06 NOTE — PLAN OF CARE
Problem: Goal Outcome Summary  Goal: Goal Outcome Summary  Outcome: No Change  Disoriented x4. Assist of 2 with cares, incontinent of urine. On IV Unasyn. On 2L O2, takes it off at times. Coarse lung sounds. Cooperative with cares at times.

## 2017-05-06 NOTE — PROGRESS NOTES
[facility]    Hospitalist Progress Note    Date of Service (when I saw the patient): 05/06/2017    Assessment & Plan     83 year old male with PMH including anxiety, dementia who presents from his memory care facility with shortness of breath found to have fever, leukocytosis and subtle RML infiltrate in the setting of a suspected recent aspiration episode. He is being admitted for treatment of aspiration pneumonia and also has a right eye conjunctivitis which appears bacterial.     Assessment and Plan:     # Sepsis due to RML pneumonia, suspected to be aspiration.  Presented with productive cough, dyspnea and subtle RML infiltrate with a mild leukocytosis. Report of aspiration episode while eating a couple of days prior to admission.     -- Continue IV Unasyn   -- Follow blood cultures  -- supplemental O2 as needed and await culture results.   -- SLP evaluation     # Unilateral conjunctivitis: right eye. this does seem to be somewhat purulent and may be bacterial. In addition to systemic antibiotics will continue polytrim eye drops.      #  Dementia with behavioral issues: lives in memory care unit.  Has some behavioral issues at baseline.  Will take meds crushed in pudding generally  -- May have component of acute encephalopathy from infection on top of dementia.   -- Mentation seems to vary between lethargy and confusion,   -- Holding home seroquel this morning due to lethargy   - PTA medications include cymbalta, namenda, and seroquel , resume as mentation allows   -give seroquel after meals instead of before so he is not too sedated for eating and decrease the risk of aspiration  -does better with seroquel prn for agitation and would prefer using this to haldol if able.        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 His daughter would like a trial of therapies here. PT/OT consulted      DVT Prophylaxis: Enoxaparin (Lovenox) SQ    Code Status: Full Code    Disposition: Expected discharge in 2-3 days once resp status and mentation improves     Tauseef Ur Rony    Interval History   Chart reviewed and patient seen, discussed with nursing staff, patient lethargic, confused, moves his arms around, unable to understand speech    -Data reviewed today: I reviewed all new labs and imaging results over the last 24 hours. I personally reviewed no images or EKG's today.    Physical Exam   Temp: 98.6  F (37  C) Temp src: Axillary BP: 141/61 Pulse: 72   Resp: 18 SpO2: 96 % O2 Device: Nasal cannula Oxygen Delivery: 2 LPM  Vitals:    05/05/17 1717   Weight: 86.2 kg (190 lb 1.6 oz)     Vital Signs with Ranges  Temp:  [96.3  F (35.7  C)-100.6  F (38.1  C)] 98.6  F (37  C)  Pulse:  [72-74] 72  Resp:  [18-20] 18  BP: (139-158)/(53-66) 141/61  SpO2:  [93 %-96 %] 96 %  I/O last 3 completed shifts:  In: 974 [I.V.:974]  Out: -       Constitutional: Eyes closed, resting in bed, confused, fidgeting   Respiratory: Coarse sounds in right lung, no crackles or wheezing noted,   Cardiovascular: Regular rate and rhythm, normal S1 and S2, no loud murmur, rubs or gallops noted   Abdomen: Bowel sounds present, soft, non-distended, non-tender   Skin: No exanthems noted on exposed areas, no cyanosis, dry to touch   Neuro: Tired appearing, confused, moving all extremities , unintelligible speech    Extremities: No pitting edema,  skin is warm to touch with signs of adequate peripheral perfusion    Psychiatric: Confused              Medications     NaCl 75 mL/hr at 05/05/17 1725       multivitamin, therapeutic with minerals  1 tablet Oral Daily     trimethoprim-polymyxin b  2 drop Right Eye 4x  Daily     ampicillin-sulbactam (UNASYN) IV  3 g Intravenous Q6H     DULoxetine  30 mg Oral Daily     memantine (NAMENDA) tablet 10 mg  10 mg Oral Daily     tamsulosin  0.4 mg Oral QPM     QUEtiapine (SEROquel) half-tab 12.5 mg  12.5 mg Oral Daily with lunch     QUEtiapine  25 mg Oral QAM     QUEtiapine  25 mg Oral At Bedtime     QUEtiapine  12.5 mg Oral Daily with supper       Data   All new lab data and imaging results from today have been reviewed       Recent Labs  Lab 05/06/17  0743 05/05/17  1358   WBC 10.0 11.7*   HGB 9.5* 9.9*    100    233    140   POTASSIUM 3.7 4.2   CHLORIDE 106 106   CO2 28 30   BUN 18 24   CR 0.92 1.11   ANIONGAP 7 4   ARANZA 8.7 9.1   GLC 88 98   ALBUMIN  --  3.0*   PROTTOTAL  --  7.2   BILITOTAL  --  1.0   ALKPHOS  --  54   ALT  --  18   AST  --  18       Recent Results (from the past 24 hour(s))   XR Chest 1 View    Narrative    XR CHEST 1 VW 5/5/2017 3:21 PM    HISTORY: fever, cough      Impression    IMPRESSION: Shallow inspiration with some minimal atelectasis right  lung base. I cannot exclude a nodular density in the right midlung not  seen on the previous exam dated 1/16/2017.    WIL CHARLES MD

## 2017-05-06 NOTE — PLAN OF CARE
Problem: Goal Outcome Summary  Goal: Goal Outcome Summary  OT eval completed and treatment initiated. Pt. is a 82 yo male who presents with aspiration pneumonia and right eye infection and dementia impairing tolerance and participation in ADLs.  Pt. comes from memory care.  Skilled IP OT necessary for ADL training to return to previous functional level or provide training to assist with safe ADLs. Pt. unable to fully attend during session and needing max A for verbal cues and tactile cues to attend to and tolerate tasks.  OT provided max A x 1-2 with supine to sit and sitting at EOB.  Pt. has fluctuating tone that impairs ROM and positioning.  OT provided universal sling to transfer from bed to chair with A x 2.  Pt. unable to sit up in high back chair as looking to slide out.  OT provided sling to move from high back chair to recliner. OT provided pillows to position in chair and tactile cues to relax UE and LE tone to appropriately position in chair.  OT provided washcloth and total assist to wash eye and face.  OT donned personal alarm and shared status with NSG.  NSG present in room at end of session.  OT recommends discharge back to memory care with assist for ADLs.

## 2017-05-06 NOTE — PLAN OF CARE
Problem: SLP General Care Plan  Goal: Swallow Goal: Safely tolerate diet without aspiration (SLP)  Safely tolerate diet without signs/symptoms of aspiration  Clinical Swallow Evaluation Completed                                      Result: Moderate oral-pharyngeal dysphagia due to lethargy (poor A/P movement, premature pharyngeal entry, decreased hyolaryngeal excursion, aspiration x1). A pureed diet with nectar thick liquids has been ordered. Proceed cautiously only if the patient's alertness level increases.  Recommend:    1. Diet of Dysphagia Diet Level 1 (Puree) with NECTAR-thick liquids only if patient is alert.  2. Follow Aspiration Precautions (ordered in EPIC) including:               To Reduce the Risk of Aspiration:  Sit upright 90 degrees for PO in chair, as able  Sit upright 15-30 min after PO  PO only when fully alert  No straws  Small bites/sips  Slow rate for PO  Level of supervision: 1:1, total assist  3. SLP to follow 5x/wee to address dysphagia until goals are met.  4. Anticipate D/C to memory care unit with no further SLP recommended at next level of care.     See Progress Notes for full details of today's Clinical Swallowing assessment.    Thank you for this referral, call if concerns @ [phone] Rehabilitation Services Department.  Speech-Language Pathology

## 2017-05-06 NOTE — PLAN OF CARE
Problem: Goal Outcome Summary  Goal: Goal Outcome Summary  Outcome: Declining  Areas of concern: patient has been very lethargic resulting in not being able to eat or take medications. Daughter reports patient was lethargic for 2-3 days when he had pneumonia i the past. Patient occasionally spits out or hits at staff with cares.      Areas of improvement: none     Plan: patient on pureed diet, feed when more alert. Aspiration precautions. Attempt to resume home meds asap. IV fluids continue. Ceiling lift used to get him into recliner. IV Unasyn continues. POC reviewed with wife and daughter. Daughter Lisa involved in care.

## 2017-05-06 NOTE — CONSULTS
"CLINICAL NUTRITION SERVICES  -  ASSESSMENT NOTE      Recommendations Ordered by Registered Dietitian (RD):   -Regular diet with textures / liquids per SLP  -Magic shakes with meals TID  -MVI+mineral  -Room service not appropriate          REASON FOR ASSESSMENT  Eder Pearson is a 83 year old male seen by Registered Dietitian for Admission Nutrition Risk Screen - Reduced oral intake over the last month      NUTRITION HISTORY  - Information obtained from chart review   - Patient is on a regular diet at home (memory care). Noted that patient does not like chicken, kid's cereal or talapia  - Patient with increasing weakness recently with inability to eat on his own over the past few days. Additionally, patient with coughing while eating a meal  - No nutritional supplements noted; however patient does drink gatorade 1x/day (provided by family) for electrolyte maintenance  - Patient with history of dementia w/behavioral issues and CKD 2-3 (baseline Cr of 1.1)    CURRENT NUTRITION ORDERS  Diet Order:     DD1 + NTL    Current Intake/Tolerance:  -NPO for SLP evaluation  -5/6 - SLP evaluation - Patient with moderate oral-pharyngeal dysphagia d/t lethargy --> recommend pureed diet with nectar-thick liquids       PHYSICAL FINDINGS  Observed  -Patient sleeping soundly at time of visit, thus unable to complete physical assessment   Obtained from Chart/Interdisciplinary Team  -Disoriented x 4   -Fecal incontinence     ANTHROPOMETRICS  Height: 6' 0\"  Weight: 190 lbs 1.6 oz (86.2 kg)  Body mass index is 25.78 kg/(m^2).  Weight Status:  Overweight BMI 25-29.9  IBW: 80.91 kg   % IBW: 107%  Weight History: The data below suggests that patient's weight has been stable over the past four months.   Wt Readings from Last 10 Encounters:   05/05/17 86.2 kg (190 lb 1.6 oz)   01/20/17 86 kg (189 lb 8 oz)   01/27/15 108.9 kg (240 lb)   ]    LABS  Labs reviewed    MEDICATIONS  Medications reviewed    Dosing Weight: 86.2 kg (admit " "weight)    ASSESSED NUTRITION NEEDS (PER APPROVED PRACTICE GUIDELINES):  Estimated Energy Needs: 9206-5829 kcals (20-25 Kcal/Kg)  Justification: maintenance / limited activity   Estimated Protein Needs:  grams protein (1-1.2 g pro/Kg)  Justification: maintenance  Estimated Fluid Needs: 1674-2013  mL (1 mL/Kcal)  Justification: maintenance    MALNUTRITION:  % Weight Loss:  None noted  % Intake:  Unable to assess   Subcutaneous Fat Loss:  Unable to assess  Muscle Loss:  Unable to assess   Fluid Retention:  None noted    Malnutrition Diagnosis: Unable to determine due to insufficient information    NUTRITION DIAGNOSIS:  Predicted suboptimal nutrient intake (protein-energy) related to swallowing difficulties secondary to dysphagia and weakness     NUTRITION INTERVENTIONS  Recommendations / Nutrition Prescription  1. Regular diet with textures / liquids per SLP  2. Nutritional supplements with meals TID  3. Room service not appropriate  4. MVI+mineral     Implementation  Nutrition education: Not appropriate at this time due to patient condition  Medical Food Supplement - Ordered magic shakes with meals TID     Multivitamin/Mineral - Ordered MVI+mineral     Collaboration and referral of nutrition care - Made patient \"room service not appropriate\"      Nutrition Goals  Pt to consistently consume >/=50% of meals TID + 2 supplements/day      MONITORING AND EVALUATION:  Diet Order / Food intake and Liquid meal replacement or supplement - Ability to advance diet per SLP and respective intake (% of meals, supplements)    Lauren Carrasquillo RD, LD  Clinical Dietitian  3rd Floor/ICU Pager: [phone]  All Other Floors Pager: [phone]  Weekend/Holiday Pager: [phone]                "

## 2017-05-06 NOTE — PROGRESS NOTES
Clinical Swallow Evaluation  [facility]  05/06/17 0835   General Information   Onset Date 05/05/17   Start of Care Date 05/06/17   Referring Physician Dr. Anna   Patient Profile Review/OT: Additional Occupational Profile Info See Profile for full history and prior level of function   Patient/Family Goals Statement Unable to state   Swallowing Evaluation Bedside swallow evaluation   Behaviorial Observations Confused;Lethargic   Mode of current nutrition NPO   Respiratory Status O2 Supply   Type of O2 supply Nasal cannula   Comments Patient needing assist with meals at [facility] and noted to cough/choke during meal. Admit with RML pneumonia, suspect aspiration.   Past Medical History:   Diagnosis Date     Anxiety      Dementia      Prostate cancer (H)       Clinical Swallow Evaluation   Oral Musculature unable to assess due to poor participation/comprehension   Structural Abnormalities none present   Dentition present and adequate   Mucosal Quality adequate   Laryngeal Function Voicing initiated   Clinical Swallow Eval: Thin Liquid Texture Trial   Mode of Presentation, Thin Liquids cup;fed by clinician   Volume of Liquid or Food Presented x2 sips   Oral Phase of Swallow Premature pharyngeal entry   Pharyngeal Phase of Swallow coughing/choking;reduction in laryngeal movement   Diagnostic Statement Aspiration   Clinical Swallow Eval: Puree Solid Texture Trial   Mode of Presentation, Puree spoon;fed by clinician   Volume of Puree Presented x3 bites applesauce   Oral Phase, Puree Poor AP movement  (prolonged chewing)   Pharyngeal Phase, Puree impaired;reduction in laryngeal movement   Diagnostic Statement No overt s/sx of aspiration   Swallow Compensations   Swallow Compensations Pacing;Reduce amounts   Results Aspiration   General Therapy Interventions   Planned Therapy Interventions Dysphagia Treatment   Dysphagia treatment Modified diet education;Instruction of safe swallow strategies   Swallow Eval: Clinical  Impressions   Skilled Criteria for Therapy Intervention Skilled criteria met.  Treatment indicated.   Functional Assessment Scale (FAS) 3   Dysphagia Outcome Severity Scale (KENNY) Level 3 - KENNY   Treatment Diagnosis oral-pharyngeal dysphagia   Diet texture recommendations Dysphagia diet level 1;Nectar thick liquids   Recommended Feeding/Eating Techniques maintain upright posture during/after eating for 30 mins;no straws;small sips/bites   Therapy Frequency 5 times/wk   Predicted Duration of Therapy Intervention (days/wks) One week (by 5/13/17)   Anticipated Discharge Disposition (return to memory care)   Risks and Benefits of Treatment have been explained. Yes   Patient, family and/or staff in agreement with Plan of Care Yes   Clinical Impression Comments Patient presents with a moderate oral-pharyngeal dysphagia characterized by poor A/P movement, premature pharyngeal entry, decreased laryngeal elevation, and aspiration with thin liquids. Patient was lethergic which negatively impacted his swallow. Initially was more alert, however, very quickly began to become less alert and fall back asleep. Once patient's alertness level improves, anticipate his swallow will improve greatly. Proceed cautiously with a Dysphagia Diet Level 1 (Puree) with NECTAR-thick liquids if patient is alert.    Total Evaluation Time   Total Evaluation Time (Minutes) 23   Fran Weaver MS CCC-SLP

## 2017-05-06 NOTE — PLAN OF CARE
PT evaluation attempted. Pt said some unintelligible words and waved his hand twice, but otherwise did not respond to repeated attempts to awaken and participate. Will check back as the schedule allows. Nrg reports awaiting speech evaluation due to NPO status.

## 2017-05-07 ENCOUNTER — APPOINTMENT (OUTPATIENT)
Dept: SPEECH THERAPY | Facility: CLINIC | Age: 82
DRG: 871 | End: 2017-05-07
Payer: MEDICARE

## 2017-05-07 ENCOUNTER — APPOINTMENT (OUTPATIENT)
Dept: PHYSICAL THERAPY | Facility: CLINIC | Age: 82
DRG: 871 | End: 2017-05-07
Payer: MEDICARE

## 2017-05-07 LAB
ANION GAP SERPL CALCULATED.3IONS-SCNC: 4 MMOL/L (ref 3–14)
BUN SERPL-MCNC: 17 MG/DL (ref 7–30)
CALCIUM SERPL-MCNC: 8.6 MG/DL (ref 8.5–10.1)
CHLORIDE SERPL-SCNC: 109 MMOL/L (ref 94–109)
CO2 SERPL-SCNC: 29 MMOL/L (ref 20–32)
CREAT SERPL-MCNC: 0.9 MG/DL (ref 0.66–1.25)
ERYTHROCYTE [DISTWIDTH] IN BLOOD BY AUTOMATED COUNT: 17.3 % (ref 10–15)
GFR SERPL CREATININE-BSD FRML MDRD: 81 ML/MIN/1.7M2
GLUCOSE SERPL-MCNC: 109 MG/DL (ref 70–99)
HCT VFR BLD AUTO: 30.1 % (ref 40–53)
HGB BLD-MCNC: 9.5 G/DL (ref 13.3–17.7)
MAGNESIUM SERPL-MCNC: 1.8 MG/DL (ref 1.6–2.3)
MCH RBC QN AUTO: 31.8 PG (ref 26.5–33)
MCHC RBC AUTO-ENTMCNC: 31.6 G/DL (ref 31.5–36.5)
MCV RBC AUTO: 101 FL (ref 78–100)
PLATELET # BLD AUTO: 282 10E9/L (ref 150–450)
POTASSIUM SERPL-SCNC: 3.7 MMOL/L (ref 3.4–5.3)
RBC # BLD AUTO: 2.99 10E12/L (ref 4.4–5.9)
SODIUM SERPL-SCNC: 142 MMOL/L (ref 133–144)
WBC # BLD AUTO: 7.8 10E9/L (ref 4–11)

## 2017-05-07 PROCEDURE — 83735 ASSAY OF MAGNESIUM: CPT | Performed by: INTERNAL MEDICINE

## 2017-05-07 PROCEDURE — 80048 BASIC METABOLIC PNL TOTAL CA: CPT | Performed by: INTERNAL MEDICINE

## 2017-05-07 PROCEDURE — 40000193 ZZH STATISTIC PT WARD VISIT

## 2017-05-07 PROCEDURE — 25000132 ZZH RX MED GY IP 250 OP 250 PS 637: Mod: GY | Performed by: INTERNAL MEDICINE

## 2017-05-07 PROCEDURE — 99232 SBSQ HOSP IP/OBS MODERATE 35: CPT | Performed by: INTERNAL MEDICINE

## 2017-05-07 PROCEDURE — 92526 ORAL FUNCTION THERAPY: CPT | Mod: GN | Performed by: SPEECH-LANGUAGE PATHOLOGIST

## 2017-05-07 PROCEDURE — 97161 PT EVAL LOW COMPLEX 20 MIN: CPT | Mod: GP

## 2017-05-07 PROCEDURE — 97530 THERAPEUTIC ACTIVITIES: CPT | Mod: GP

## 2017-05-07 PROCEDURE — 12000000 ZZH R&B MED SURG/OB

## 2017-05-07 PROCEDURE — 40000225 ZZH STATISTIC SLP WARD VISIT: Performed by: SPEECH-LANGUAGE PATHOLOGIST

## 2017-05-07 PROCEDURE — 85027 COMPLETE CBC AUTOMATED: CPT | Performed by: INTERNAL MEDICINE

## 2017-05-07 PROCEDURE — 36415 COLL VENOUS BLD VENIPUNCTURE: CPT | Performed by: INTERNAL MEDICINE

## 2017-05-07 PROCEDURE — 25000125 ZZHC RX 250: Performed by: INTERNAL MEDICINE

## 2017-05-07 PROCEDURE — A9270 NON-COVERED ITEM OR SERVICE: HCPCS | Mod: GY | Performed by: INTERNAL MEDICINE

## 2017-05-07 PROCEDURE — 25000128 H RX IP 250 OP 636: Performed by: INTERNAL MEDICINE

## 2017-05-07 RX ADMIN — POLYMYXIN B SULFATE AND TRIMETHOPRIM 2 DROP: 1; 10000 SOLUTION OPHTHALMIC at 21:06

## 2017-05-07 RX ADMIN — AMPICILLIN SODIUM AND SULBACTAM SODIUM 3 G: 2; 1 INJECTION, POWDER, FOR SOLUTION INTRAMUSCULAR; INTRAVENOUS at 23:35

## 2017-05-07 RX ADMIN — POLYMYXIN B SULFATE AND TRIMETHOPRIM 2 DROP: 1; 10000 SOLUTION OPHTHALMIC at 16:09

## 2017-05-07 RX ADMIN — DULOXETINE HYDROCHLORIDE 30 MG: 30 CAPSULE, DELAYED RELEASE ORAL at 12:07

## 2017-05-07 RX ADMIN — ACETAMINOPHEN 650 MG: 325 TABLET, FILM COATED ORAL at 21:06

## 2017-05-07 RX ADMIN — QUETIAPINE FUMARATE 25 MG: 25 TABLET, FILM COATED ORAL at 21:06

## 2017-05-07 RX ADMIN — AMPICILLIN SODIUM AND SULBACTAM SODIUM 3 G: 2; 1 INJECTION, POWDER, FOR SOLUTION INTRAMUSCULAR; INTRAVENOUS at 12:05

## 2017-05-07 RX ADMIN — AMPICILLIN SODIUM AND SULBACTAM SODIUM 3 G: 2; 1 INJECTION, POWDER, FOR SOLUTION INTRAMUSCULAR; INTRAVENOUS at 17:53

## 2017-05-07 RX ADMIN — AMPICILLIN SODIUM AND SULBACTAM SODIUM 3 G: 2; 1 INJECTION, POWDER, FOR SOLUTION INTRAMUSCULAR; INTRAVENOUS at 06:06

## 2017-05-07 RX ADMIN — ENOXAPARIN SODIUM 40 MG: 40 INJECTION SUBCUTANEOUS at 15:19

## 2017-05-07 RX ADMIN — POLYMYXIN B SULFATE AND TRIMETHOPRIM 2 DROP: 1; 10000 SOLUTION OPHTHALMIC at 09:26

## 2017-05-07 RX ADMIN — TAMSULOSIN HYDROCHLORIDE 0.4 MG: 0.4 CAPSULE ORAL at 21:06

## 2017-05-07 NOTE — PLAN OF CARE
Problem: Goal Outcome Summary  Goal: Goal Outcome Summary  PT: Evaluation complete, treatment initiated.  Pt more alert and pleasant this date, able to sit upright in recliner with supervision, no evidence of sliding forward.  Trialed sit to stand with Meenu Steady however pt unable to fully extend into upright with maxAx2, RECOMMEND NURSING CONTINUE TO USE LIFT AT THIS TIME.  Following previous hospitalization pt had participated in therapy and made great progress with return to ambulation using AD and assisting inconsistently with transfers, however had not ambulated in the past 1 month d/t facility policy, still variably assisting with transfers recliner>w/c however primarily using lift.  Consider decrease in frequency of PT visits if pt not demonstrating progress with Meenu Steady/assisting with bed mobility/transfers.  PT recommend return to memory care with increased use of lift as needed, consideration of returning to PT for progression back towards assistance with transfers as able.

## 2017-05-07 NOTE — PLAN OF CARE
Problem: Goal Outcome Summary  Goal: Goal Outcome Summary  Outcome: No Change  VSS. Disoriented x4. Assist of 2 with cares. Combative at times but settles down quickly. Infrequent non productive cough. 2L of 02. Incontinent of urine. On IV Unasyn.

## 2017-05-07 NOTE — PROGRESS NOTES
05/07/17 1420   Quick Adds   Type of Visit Initial PT Evaluation   Living Environment   Lives With facility resident   Living Arrangements (memory care)   Home Accessibility no concerns   Number of Stairs to Enter Home 0   Number of Stairs Within Home 0   Living Environment Comment Pt is a resident in memory care unit   Self-Care   Usual Activity Tolerance fair   Current Activity Tolerance poor   Regular Exercise no   Equipment Currently Used at Home lift device   Activity/Exercise/Self-Care Comment Per dtr after pt's prior hospitalization in January worked with PT and was back to walking with WW with supervision, using lift sometimes still for transfers, however facility regulations state if pt needing use of lift then not safe to ambulate at all, so for the past 1 month has not been ambulating with WW any more, attempting to assist with some transfers from recliner<>w/c with variable success.   Functional Level Prior   Ambulation 4-->completely dependent   Transferring 3-->assistive equipment and person   Toileting 4-->completely dependent   Bathing 4-->completely dependent   Dressing 4-->completely dependent   Eating 3-->assistive equipment and person   Communication 2-->difficulty understanding and speaking (not related to language barrier)   Swallowing 2-->difficulty swallowing liquids/foods   Cognition 2 - difficulty with organizing thoughts   Fall history within last six months yes   Number of times patient has fallen within last six months 1   Which of the above functional risks had a recent onset or change? ambulation   General Information   Onset of Illness/Injury or Date of Surgery - Date 05/05/17   Referring Physician Dr. Bunch   Patient/Family Goals Statement Return to PLOF at facility   Pertinent History of Current Problem (include personal factors and/or comorbidities that impact the POC) Pt presented to ED with fever and increasing cough, noted to be not eating on his own as well as he had  been, thought to have potentially aspirated a few days ago, noted to have aspiration pneumonia upon admission, as well as bacterial conjunctivitus Rt eye. Pt has history of dementia and prostate cancer.   Cognitive Status Examination   Orientation not oriented to person, place or time   Level of Consciousness alert   Follows Commands and Answers Questions 50% of the time   Personal Safety and Judgment impaired   Memory impaired   Cognitive Comment Pt alert and pleasant and conversational off topic   Pain Assessment   Patient Currently in Pain No   Integumentary/Edema   Integumentary/Edema no deficits were identifed   Posture    Posture Forward head position;Protracted shoulders;Kyphosis   Range of Motion (ROM)   ROM Comment LE ROM is WNL   Strength   Strength Comments Unable to follow commands for MMT, noted to have against gravity hip flexion/knee extension BL   Bed Mobility   Bed Mobility Comments Not assessed as pt in recliner upon PT arrival, ended session sitting up in recliner.   Transfer Skills   Transfer Comments Trial of sit to stand to WW and Meenu Steady with maxA x 2, unable to achieve full stand (to Meenu Steady unable to achieve hip extension/keeping arms extended).   Gait   Gait Comments Not assessed   Balance   Balance Comments Pt maintaining sitting in recliner, feet on floor, without difficulty (no sliding forward noted)   Muscle Tone   Muscle Tone Comments mild increase in LE extensor tone   General Therapy Interventions   Planned Therapy Interventions bed mobility training;transfer training   Clinical Impression   Criteria for Skilled Therapeutic Intervention yes, treatment indicated   PT Diagnosis Difficulty with transfers   Influenced by the following impairments Decreased strength, decreased endurance, cognition   Functional limitations due to impairments Decreased ability to assist with functional transfers/mobility   Clinical Presentation Stable/Uncomplicated   Clinical Presentation Rationale  "Medical status stable at this time.   Clinical Decision Making (Complexity) Low complexity   Therapy Frequency` daily   Predicted Duration of Therapy Intervention (days/wks) 3 days   Anticipated Discharge Disposition Long Term Care Facility   Risk & Benefits of therapy have been explained Yes   Patient, Family & other staff in agreement with plan of care Yes   [facility] TM \"6 Clicks\"   2016, Trustees of [employer], under license to [employer]  All rights reserved.   6 Clicks Short Forms Basic Mobility Inpatient Short Form   [employer] AM-PAC  \"6 Clicks\" V.2 Basic Mobility Inpatient Short Form   1. Turning from your back to your side while in a flat bed without using bedrails? 1 - Total   2. Moving from lying on your back to sitting on the side of a flat bed without using bedrails? 1 - Total   3. Moving to and from a bed to a chair (including a wheelchair)? 1 - Total   4. Standing up from a chair using your arms (e.g., wheelchair, or bedside chair)? 1 - Total   5. To walk in hospital room? 1 - Total   6. Climbing 3-5 steps with a railing? 1 - Total   Basic Mobility Raw Score (Score out of 24.Lower scores equate to lower levels of function) 6   Total Evaluation Time   Total Evaluation Time (Minutes) 15     "

## 2017-05-07 NOTE — PLAN OF CARE
Problem: Goal Outcome Summary  Goal: Goal Outcome Summary  Outcome: Improving  Pt lethargic most of shift, but able to arouse easily.  Family assisted him with dinner and ate approximately 75%.  Can be resistive with cares at times.  Incontinent of bowel and bladder.  Repositioned and changed with Ax2.  Remains on 2L.  Low grade temp of 99.4-PRN Tylenol x1.  Will continue to monitor.

## 2017-05-07 NOTE — PROGRESS NOTES
[facility]    Hospitalist Progress Note    Date of Service (when I saw the patient): 05/07/2017    Assessment & Plan     83 year old male with PMH including anxiety, dementia who presents from his memory care facility with shortness of breath found to have fever, leukocytosis and subtle RML infiltrate in the setting of a suspected recent aspiration episode. He is being admitted for treatment of aspiration pneumonia and also has a right eye conjunctivitis which appears bacterial.     Assessment and Plan:     # Sepsis due to RML pneumonia, suspected to be aspiration.  Presented with productive cough, dyspnea and subtle RML infiltrate with a mild leukocytosis. Report of aspiration episode while eating a couple of days prior to admission.     -- Continue IV Unasyn   -- Follow blood cultures  -- supplemental O2 as needed and await culture results.   -- SLP evaluation , on modified diet   -- Aspiration precautions, ensure patient is upright for meals    # Unilateral conjunctivitis: right eye. this does seem to be somewhat purulent and may be bacterial. In addition to systemic antibiotics, will continue polytrim eye drops.   -- Improving     #  Dementia with behavioral issues: lives in memory care unit.  Has some behavioral issues at baseline.  Will take meds crushed in pudding generally  -- May have component of acute encephalopathy from infection on top of dementia. Much more awake today, improving, able to converse  -- PTA medications include cymbalta, namenda, and seroquel , resume as mentation allows     -give seroquel after meals instead of before so he is not too sedated for eating and decrease the risk of aspiration  -does better with seroquel prn for agitation and would prefer using this to haldol if able.        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 Although again for last one month has been using only the lift. His daughter would like a trial of therapies , PT/OT consulted      DVT Prophylaxis: Enoxaparin (Lovenox) SQ    Code Status: Full Code    Disposition: Expected discharge in 2-3 days once resp status and mentation improves     Updated daughter Lisa over the phone    [facility]    Interval History   Chart reviewed and patient seen, discussed with nursing staff, patient much more awake, denies any pain, having a cough, still confused    -Data reviewed today: I reviewed all new labs and imaging results over the last 24 hours. I personally reviewed no images or EKG's today.    Physical Exam   Temp: 98.6  F (37  C) Temp src: Axillary BP: 149/65 Pulse: 77   Resp: 20 SpO2: 92 % O2 Device: Nasal cannula Oxygen Delivery: 2 LPM  Vitals:    05/05/17 1717   Weight: 86.2 kg (190 lb 1.6 oz)     Vital Signs with Ranges  Temp:  [97.3  F (36.3  C)-99.4  F (37.4  C)] 98.6  F (37  C)  Pulse:  [65-77] 77  Resp:  [16-20] 20  BP: (134-153)/(47-65) 149/65  SpO2:  [89 %-95 %] 92 %  I/O last 3 completed shifts:  In: 1226 [P.O.:100; I.V.:1126]  Out: -       Constitutional: Awake., sitting in chair, working with therapies , right eye swelling and redness looks better    Respiratory: Coarse sounds in right lung, no crackles or wheezing noted,   Cardiovascular: Regular rate and rhythm, normal S1 and S2, no loud murmur, rubs or gallops noted   Abdomen: Bowel sounds present, soft, non-distended, non-tender   Skin: No exanthems noted on exposed areas, no cyanosis, dry to touch   Neuro: Awake, confused, moving all extremities , able to converse   Extremities: No pitting edema,  skin is warm to touch with signs of adequate peripheral perfusion    Psychiatric: Confused               Medications     NaCl 75 mL/hr at 05/06/17 2241       multivitamin, therapeutic with minerals  1 tablet Oral Daily     enoxaparin  40 mg Subcutaneous Q24H     trimethoprim-polymyxin b  2 drop Right Eye 4x Daily     ampicillin-sulbactam (UNASYN) IV  3 g Intravenous Q6H     DULoxetine  30 mg Oral Daily     memantine (NAMENDA) tablet 10 mg  10 mg Oral Daily     tamsulosin  0.4 mg Oral QPM     QUEtiapine (SEROquel) half-tab 12.5 mg  12.5 mg Oral Daily with lunch     QUEtiapine  25 mg Oral QAM     QUEtiapine  25 mg Oral At Bedtime     QUEtiapine  12.5 mg Oral Daily with supper       Data   All new lab data and imaging results from today have been reviewed       Recent Labs  Lab 05/06/17  0743 05/05/17  1358   WBC 10.0 11.7*   HGB 9.5* 9.9*    100    233    140   POTASSIUM 3.7 4.2   CHLORIDE 106 106   CO2 28 30   BUN 18 24   CR 0.92 1.11   ANIONGAP 7 4   ARANZA 8.7 9.1   GLC 88 98   ALBUMIN  --  3.0*   PROTTOTAL  --  7.2   BILITOTAL  --  1.0   ALKPHOS  --  54   ALT  --  18   AST  --  18       No results found for this or any previous visit (from the past 24 hour(s)).

## 2017-05-07 NOTE — PLAN OF CARE
Problem: SLP General Care Plan  Goal: Swallow Goal: Safely tolerate diet without aspiration (SLP)  Safely tolerate diet without signs/symptoms of aspiration   Swallow Treatment: Alertness improved and fed patient AM meal. No coughing during meal, however, cough x2 after patient became drowsy and meal was ended.     Recommendations:  1. Continue with Dysphagia Diet Level 1 (Puree) with NECTAR-thick liquids   2. SLP to trial advanced textures tomorrow if alertness level continues to improve  3. Follow aspiration precautions: Include liquids via spoon rather than cup  4. Return to memory care at [facility]

## 2017-05-08 ENCOUNTER — APPOINTMENT (OUTPATIENT)
Dept: SPEECH THERAPY | Facility: CLINIC | Age: 82
DRG: 871 | End: 2017-05-08
Payer: MEDICARE

## 2017-05-08 LAB — MAGNESIUM SERPL-MCNC: 2 MG/DL (ref 1.6–2.3)

## 2017-05-08 PROCEDURE — 25000128 H RX IP 250 OP 636: Performed by: INTERNAL MEDICINE

## 2017-05-08 PROCEDURE — 99232 SBSQ HOSP IP/OBS MODERATE 35: CPT | Performed by: INTERNAL MEDICINE

## 2017-05-08 PROCEDURE — 36415 COLL VENOUS BLD VENIPUNCTURE: CPT | Performed by: INTERNAL MEDICINE

## 2017-05-08 PROCEDURE — 12000000 ZZH R&B MED SURG/OB

## 2017-05-08 PROCEDURE — 25000132 ZZH RX MED GY IP 250 OP 250 PS 637: Mod: GY | Performed by: INTERNAL MEDICINE

## 2017-05-08 PROCEDURE — 83735 ASSAY OF MAGNESIUM: CPT | Performed by: INTERNAL MEDICINE

## 2017-05-08 PROCEDURE — 40000225 ZZH STATISTIC SLP WARD VISIT: Performed by: SPEECH-LANGUAGE PATHOLOGIST

## 2017-05-08 PROCEDURE — 99223 1ST HOSP IP/OBS HIGH 75: CPT | Performed by: CLINICAL NURSE SPECIALIST

## 2017-05-08 PROCEDURE — A9270 NON-COVERED ITEM OR SERVICE: HCPCS | Mod: GY | Performed by: INTERNAL MEDICINE

## 2017-05-08 PROCEDURE — 25000125 ZZHC RX 250: Performed by: INTERNAL MEDICINE

## 2017-05-08 PROCEDURE — 92526 ORAL FUNCTION THERAPY: CPT | Mod: GN | Performed by: SPEECH-LANGUAGE PATHOLOGIST

## 2017-05-08 RX ADMIN — POLYMYXIN B SULFATE AND TRIMETHOPRIM 2 DROP: 1; 10000 SOLUTION OPHTHALMIC at 08:56

## 2017-05-08 RX ADMIN — TAMSULOSIN HYDROCHLORIDE 0.4 MG: 0.4 CAPSULE ORAL at 21:05

## 2017-05-08 RX ADMIN — ENOXAPARIN SODIUM 40 MG: 40 INJECTION SUBCUTANEOUS at 15:10

## 2017-05-08 RX ADMIN — QUETIAPINE FUMARATE 25 MG: 25 TABLET, FILM COATED ORAL at 08:45

## 2017-05-08 RX ADMIN — DULOXETINE HYDROCHLORIDE 30 MG: 30 CAPSULE, DELAYED RELEASE ORAL at 08:44

## 2017-05-08 RX ADMIN — MEMANTINE 10 MG: 5 TABLET ORAL at 08:45

## 2017-05-08 RX ADMIN — AMPICILLIN SODIUM AND SULBACTAM SODIUM 3 G: 2; 1 INJECTION, POWDER, FOR SOLUTION INTRAMUSCULAR; INTRAVENOUS at 05:56

## 2017-05-08 RX ADMIN — Medication 12.5 MG: at 19:03

## 2017-05-08 RX ADMIN — AMPICILLIN SODIUM AND SULBACTAM SODIUM 3 G: 2; 1 INJECTION, POWDER, FOR SOLUTION INTRAMUSCULAR; INTRAVENOUS at 12:59

## 2017-05-08 RX ADMIN — AMPICILLIN SODIUM AND SULBACTAM SODIUM 3 G: 2; 1 INJECTION, POWDER, FOR SOLUTION INTRAMUSCULAR; INTRAVENOUS at 19:06

## 2017-05-08 RX ADMIN — POLYMYXIN B SULFATE AND TRIMETHOPRIM 2 DROP: 1; 10000 SOLUTION OPHTHALMIC at 16:57

## 2017-05-08 RX ADMIN — POLYMYXIN B SULFATE AND TRIMETHOPRIM 2 DROP: 1; 10000 SOLUTION OPHTHALMIC at 12:59

## 2017-05-08 RX ADMIN — SODIUM CHLORIDE: 9 INJECTION, SOLUTION INTRAVENOUS at 16:12

## 2017-05-08 RX ADMIN — POLYMYXIN B SULFATE AND TRIMETHOPRIM 2 DROP: 1; 10000 SOLUTION OPHTHALMIC at 21:25

## 2017-05-08 RX ADMIN — MULTIPLE VITAMINS W/ MINERALS TAB 1 TABLET: TAB at 08:51

## 2017-05-08 NOTE — PLAN OF CARE
Problem: Pneumonia (Adult)  Goal: Signs and Symptoms of Listed Potential Problems Will be Absent or Manageable (Pneumonia)  Signs and symptoms of listed potential problems will be absent or manageable by discharge/transition of care (reference Pneumonia (Adult) CPG).   Outcome: No Change  Disoriented x4.  Vital signs monitored. 2L oxygen. Pt is 88% on room air.  Lungs diminished. Infrequent, nonproductive cough.   IV Unasyn continues.   Incontinent of bowel and bladder.   Pureed and nectar thick liquid diet continues. Educated daughter on this.  Assist x2, mechanical lift.

## 2017-05-08 NOTE — PLAN OF CARE
Problem: Goal Outcome Summary  Goal: Goal Outcome Summary  SLP: Pt seen for swallow tx this PM with daughter and wife present. Pt with varying alertness so limited PO trials. Pt showed no overt s/sx of aspiration w/ nectar-thick liquids by cup or snack of isaiah cracker dipped in pudding. Swallow response prompt w/ adequate hyolaryngeal elevation. Mastication adequate w/ no oral residue.  Trials of thin liquids via cup resulted in wet/gurgly vocal quality. Swallow response mildly delayed; suspect premature spillage likely related to reduced alertness. No additional trials offered as pt unable to stay awake despite tactile and verbal cues. Daughter participated in education. Recommend advance to dysphagia diet level 2, continue nectar-thick liquids w/ supervision/feeding assistance. Pt must be fully awake/alert for all PO. Do not feed pt if he is sleepy and unable to maintain alertness. Small single sips okay via cup, alternate solids/liquids every few bites, slow pace. Recommend excellent oral cares. Pt okay for free water protocol of small single sips of thin water after excellent oral cares, wait 30 min after eating, must be fully awake/alert. SLP will continue to follow for diet tolerance/upgrade. Recommend d/c to memory care w/ ongoing SLP services at this time.

## 2017-05-08 NOTE — PLAN OF CARE
Problem: Goal Outcome Summary  Goal: Goal Outcome Summary  Outcome: Improving  Disoriented x4, but was more alert and talkative today.  Sat up in chair for a few hours while family visited.  Daughter assisted with feeding this evening.  Incontinent of bowel and bladder.  Low grade temp of 99.7.  PRN Tylenol given.  Remains on 1L.  Continues on IV Unasyn.  Labs in AM.  Transfers with use of lift and Ax2.  Will continue to monitor.

## 2017-05-08 NOTE — PLAN OF CARE
Problem: Goal Outcome Summary  Goal: Goal Outcome Summary  OT session attempted, pt sleeping upon OT arrival. Family present, attempted to wake patient up, would not open eyes. Family requesting therapy to return later as able.

## 2017-05-08 NOTE — CONSULTS
[facility]    Palliative Care Consultation   Text Page    Date of Admission:  5/5/2017    Assessment & Plan   Eder Pearson is a 83 year old male who was admitted on 5/5/2017. I was asked to see the patient and family for goals of care.    Recommendations:  1. Delirium - Seroquel as per Hospitalist team. Appreciate nursing help to achieve routine with cares and activities, use sensory aides, promote sleep at night and awake during the day. Cymbalta and Namenda restarted 5/6 by Hospitalists.  2. Dyspnea - Promote good pulmonary toilet, position for comfort, breathing,  oxygen as previously ordered, offer fan prn. Position for optimal safety and alertness with eating. Will hold on opioid for dyspnea prn for now.    Goal of Care: Full code. Have requested dtr Lisa to bring in copy of pt HCD. Reviewed POLST from [facility] [place] that is identical to POLST on file for [facility] from 1/2017. Will continue conversation with Dtr Lisa and pt's wife Nuvia 5/9 afternoon and give them information on CPR, intubation/respiratory support and options for levels of interventions for pt.    Disease Process/es & Symptoms:  Eder Pearson is a 83 year old patient admitted with symptoms of shortness of breath with fever and leukocytosis. He has been treated for aspiration PNA and confusion.      This is in the setting of anxiety, dementia, prostate cancer and chronic kidney disease.  He has been hospitalized Jan, 2017 for pneumonia and UTI, and now with aspiration PNA. There is a documented unitentional weight loss of 25 pounds over the past 12 months.    The following symptoms are noted by family as concerning to pt's quality of life.  Dyspnea - improving with treatment for PNA.  Drowsiness - attributed to seroquel, pneumonia illness, changes to daily routines.  Deconditioning - dtr Lisa reports that pt was limited in his ability to rehab at one point in his stay at [facility]CU, but in the last month had been  "walking and improving some of his physical strength with PT.    Psychosocial/Spiritual Needs:    Oriented to Spiritual Health and Social Work Services as part of Palliative Care team. SWS is following pt.    Decision-Making & Goals of Care:  Discussion/counseling today about goals of care/decisions:   5/8/17 Met with dtr Lisa and wife Nuvia in pt room. They retell pt history with dementia and most recent hospitalizations. They believe pt has quality of life when he as at baseline because of his ability to interact with his 3 year old grandson when Loyd, pt's  son, comes with his family from [place] every 4-6 weeks. They are hopeful that pt can recover to his baseline and return to memory care. Lisa states that this hospitalization for PNA was \"easier\" than the last one in January. They visit pt at [facility] every day, and dtr Lisa helps him with eating, physical activity. Lisa acknowledged that they are aware of their dad's decline over time. Lisa verbalizes that she sees pts at the [facility] that do not talk, do not have visitors, do not have any interaction with others, they are just taken care of and fed, day after day. She does not want pt to be like that when the time comes, but prior to this admission, her dad was improving in his physical strength. She, her mom and her siblings are talking about code status and care, and she can repeat to me the recommendations that the Hospitalists have told her about code status vs. Supportive or comfort care. \"That is a big change and we would want to call 911 and bring him back to the hospital if his breathing got bad or he was in distress\". Lisa states that she has reviewed information about cpr on the intranet, but was ok with me giving her and her mom information to review about cpr, respiratory support and decision-making. Lisa and Nuvia agreed to discuss further and review POLST tomorrow. Lisa will bring in a copy of pt's HCD.    Patient has " decision-making capacity Unreliable  Patient has a Health Care Agent named in a legal Advance Directive document that dtcarrie Gonzalez will bring. Will add name(s) and contact information in Health Care Agent/Legal Guardian section below. Until then, next of kin is pt's spouse Nuvia, who lives with pt's dtr Lisa.  Name: Nuvia Pearson, Relationship: spouse, Phone(s):[phone]      Patient has a completed health care directive available in the chart (Y/N): N - have requested that dtr Lisa bring a copy.  Physician orders for life-sustaining treatment (POLST) form is on file  Code Status: Full code     Findings & plan of care discussed with: Lisa, dtr, and Nuvia, wife. Will follow up with Dr. Uribe and LENORA Huitron in am.  Follow-up plan from palliative team: Will continue to follow this pt and family for symptom management and goals of care support.  Thank you for involving us in the patient's care.     Jessica VELA, CNS  Pain Management and Palliative Care  [facility]  Pgr: [phone]    Time Spent on this Encounter   I spent 60 minutes in assessment of the patient and discussion with the patient and family. Another 30 minutes in review of chart, documentation and discussion with the health care team.    Reason for Consult   Reason for consult: I was asked by Dr. Uribe  to evaluate this patient for Goals of care.    Primary Care Physician   [facility]    Chief Complaint    Shortness of breath, fever    History is obtained from the electronic health record, patient's daughter and patient's spouse    History of Present Illness   Eder Pearson is a 83 year old male who presents with SOB, fever, and a reddened, irritated R eye. He was diagnosed in the [facility] ED with aspiration pneumonia and conjunctivitis of his R eye. He has increased confusion during his hospitalization which had improved yesterday, to the point that he was able to talk with his son and family in [place] on  "the phone, but today he is more sedated, and having difficulty opening his eyes or interacting with me. Nursing reports that he has been incont of bowel and bladder.    Family reports that pt was hospitalized in January, 2017 with PNA, but attributed it to influenza, not aspiration, which others in their family and at his Memory Care had at that time. His daughter states that pt was \"really sick\" with the last PNA in January, but this one for aspiration PNA has not been as bad. Pt had been limited in his ability to be physically active based on rehab rules at his facility and equipment, but in the last month had been up walking with a walker, able to feed himself at meal time, and able to interact with family members, especially his 3 year old grandson from [place]. Dtr reports that she and spouse's wife visit pt daily and help him with ADLs i.e. eat, ambulate. He has had dementia for approximately 3 years with progressive cognitive decline. They describe pt with language skills that they describe as fluent, but not relevant to the conversation they might be having with him. They state that he mumbles more and more. He has periods of violence and has been on several medication regimens to control his behaviors and level of sedation. He has done fairly well with current regimen of cymbalta, seroquel and namenda per his neurologist. He has a hx of falls and was hospitalized last for fall in 12/16.    Past Medical History   I have reviewed this patient's medical history and updated it with pertinent information if needed.   Past Medical History:   Diagnosis Date     Anxiety      Dementia      Prostate cancer (H)        Past Surgical History   I have reviewed this patient's surgical history and updated it with pertinent information if needed.  History reviewed. No pertinent surgical history.    Prior to Admission Medications   Prior to Admission Medications   Prescriptions Last Dose Informant Patient Reported? Taking? "   Cholecalciferol (VITAMIN D3 PO) 5/5/2017 at 0900  Yes Yes   Sig: Take 2,000 Units by mouth daily    Cyanocobalamin (VITAMIN B 12 PO) 5/5/2017 at 0800  Yes Yes   Sig: Take 1,000 mcg by mouth daily    DULoxetine (CYMBALTA) 30 MG EC capsule 5/4/2017 at 2000  Yes No   Sig: Take 30 mg by mouth daily   Memantine HCl (NAMENDA PO) 5/5/2017 at 0900  Yes Yes   Sig: Take 10 mg by mouth daily   Psyllium (NATURAL FIBER PO) 5/5/2017 at 0900  Yes No   Sig: Take 1 tsp. by mouth daily   QUETIAPINE FUMARATE PO 5/4/2017 at 1300  Yes No   Sig: Take 12.5 mg by mouth daily (with lunch)    QUEtiapine (SEROQUEL) 25 MG tablet 5/4/2017 at 1800  Yes No   Sig: Take 12.5 mg by mouth daily (with dinner)   QUEtiapine (SEROQUEL) 25 MG tablet 5/4/2017 at 2000  Yes No   Sig: Take 25 mg by mouth At Bedtime   QUEtiapine (SEROQUEL) 25 MG tablet 5/5/2017 at 0800  Yes No   Sig: Take 25 mg by mouth every morning   QUEtiapine (SEROQUEL) 25 MG tablet prn  Yes No   Sig: Take 12.5 mg by mouth every 12 hours as needed   acetaminophen (TYLENOL) 500 MG tablet 5/5/2017 at 0700  Yes Yes   Sig: Take 1,000 mg by mouth 2 times daily , at 0700 & 1700   acetaminophen (TYLENOL) 500 MG tablet prn  Yes No   Sig: Take 1,000 mg by mouth daily as needed for mild pain   bisacodyl (DULCOLAX) 10 MG Suppository prn  Yes No   Sig: Place 10 mg rectally daily as needed for constipation   menthol-zinc oxide (CALMOSEPTINE) 0.44-20.625 % OINT ointment prn  Yes No   Sig: Apply topically every 8 hours as needed for skin protection   miconazole with skin protectant (LOPEZ ANTIFUNGAL) 2 % CREA cream am  Yes No   Sig: Apply topically every 8 hours Apply to buttocks topically every shift for barrier cream with each toileting   phenylephrine-shark liver oil-mineral oil-petrolatum (PREPARATION H) 0.25-14-74.9 % rectal ointment prn  Yes No   Sig: Place rectally daily as needed for hemorrhoids   senna-docusate (SENOKOT-S;PERICOLACE) 8.6-50 MG per tablet prn  Yes No   Sig: Take 1 tablet by  mouth every 12 hours as needed for constipation   sodium chloride (OCEAN) 0.65 % nasal spray 5/5/2017 at 0900  Yes No   Sig: Spray 2 sprays into both nostrils 2 times daily , at 0900 & 2100   sodium chloride (OCEAN) 0.65 % nasal spray prn  Yes No   Sig: Spray 2 sprays into both nostrils every 8 hours as needed for congestion   tamsulosin (FLOMAX) 0.4 MG capsule 5/4/2017 at 2000  Yes No   Sig: Take 0.4 mg by mouth At Bedtime      Facility-Administered Medications: None     Allergies   No Known Allergies    Social History   I have updated and reviewed the following Social History Narrative:   Social History     Social History Narrative     Living situation: Pt lives at [facility].  Family system: Pt is  to wife Nuvia. He has 3 grown children Sarah, Loyd and his family that live in [place]. Lisa lives here and her mother lives with her. Pt and wife lost youngest son 20+ years ago. Pt and wife had been retired and living in a house in [place] for approx 10 years.   Functional status (needs help with ADLs or IADLs):Pt requires assist with all ADLs. He transfers currently with assist of 2.   Employment/education: Retired.  Use of community resources: Lives in [facility].  Activities/interests: Grandchild and family. Going places with family, but has not been able to do this as it is too difficult to get him and out of cars.  History of substance use/abuse: None identified.  Jew affiliation: Not assessed  Involvement in [place] community: Not assessed.  Impact of illness on patient: Pt is weaker and dependent on nursing for cares. His mentation waxes and wanes/    Family History   I have reviewed this patient's family history and updated it with pertinent information if needed.   No family history on file.    Review of Systems   C: POSITIVE  for fever -improving, chills, weight lost of 25# since May, 2016.  E/M: NEGATIVE for ear, mouth and throat problems  R: NEGATIVE for significant cough  or SOB  CV: NEGATIVE for chest pain, palpitations or peripheral edema  GI: NEGATIVE for constipation    Palliative Symptom Review (0=no symptom/no concern, 1=mild, 2=moderate, 3=severe):      Pain: 0-none      Fatigue: 0-none      Nausea: 0-none      Constipation: 0-none      Diarrhea: 1-mild      Depressive Symptoms: 0-none      Anxiety: 0-none      Drowsiness: 2-moderate      Poor Appetite: 1-mild      Shortness of Breath: 0-none      Insomnia: 0-none      Other: Agitation  2-moderate      Overall (0 good/no concerns, 3 very poor):  2    Physical Exam   Temp:  [96.6  F (35.9  C)-99.9  F (37.7  C)] 99.1  F (37.3  C)  Pulse:  [65-69] 66  Resp:  [18] 18  BP: (140-162)/(59-73) 162/73  SpO2:  [88 %-92 %] 92 %  190 lbs 1.6 oz  GEN:  Drowsy, disoriented to place and time, appears to recognize family but is incorrect or unable to name them. Appears comfortable, NAD.  HEENT:  Normocephalic/atraumatic, no scleral icterus, no nasal discharge, mouth moist.  CV: Irreg, S1, S2; no murmurs noted.  +3 DP/PT pulses bilatererally; no edema BLE.  RESP:  Auscultation bilaterally anteriorly with rales, without rhonchi/wheezing/retractions.  Symmetric chest rise on inhalation noted.  Normal respiratory effort.  ABD:  Rounded, soft, non-tender/non-distended.  +BS  EXT:  Edema & pulses as noted above.  CMS intact x 4.     M/S:   Non-Tender to palpation.    SKIN:  Dry to touch, no exanthems noted in the visualized areas.    NEURO: Symmetric strength +5/5.  Sensation to touch intact all extremities.   There is no area of allodynia or hyperesthesia.  Psych:  Calm, uncooperative, conversant inappropriately and dissociated. Unable to open eyes to command, but will open eye spontaneously, or with noxious stimuli, appears to be feeding himself while I visit with his wife and dtr, but he has no food or utensils.     Delirium Screen/CAM:  Delirium = (#1 and #2 = YES) + (#3 and/or #4)   1) Acute onset and fluctuating course:   YES   (acute change  in mental status from baseline over last 24 hours)  2) Inattention:   YES   (difficulty focusing, distractible, can't follow conversation)  3) Disorganized thinking:   YES   (score only if #1 and #2 are YES)  (rambling/irrelevant conversation, unclear/illogical thoughts, inconsistency)  4) Altered level of consciousness:   YES   (score only if #1 and #2 are YES)  (other than alert, calm, cooperative)    Delirium/CAM score: 4/4  Interpretation:  1)  Delirium:  Present  2)  Type:  mixed  3)  Severity:  moderate    Data   Results for orders placed or performed during the hospital encounter of 05/05/17 (from the past 24 hour(s))   Magnesium   Result Value Ref Range    Magnesium 2.0 1.6 - 2.3 mg/dL     Exam Date Exam Time Accession # Performing Department Results       5/5/17  2:54 PM [id number] [facility]iology        [employer] Interactive Report and InfoRx      View the interactive report       PACS Images      Show images for XR Chest 1 View       Study Result      XR CHEST 1 VW 5/5/2017 3:21 PM     HISTORY: fever, cough         IMPRESSION: Shallow inspiration with some minimal atelectasis right  lung base. I cannot exclude a nodular density in the right midlung not  seen on the previous exam dated 1/16/2017.     WIL CHARLES MD         05-MAY-2017 13:55:03 [facility]-ED ROUTINE RECORD  Sinus rhythm  Left axis deviation  Right bundle branch block  Abnormal ECG  When compared with ECG of 16-JAN-2017 22:19,  Premature atrial complexes are no longer Present

## 2017-05-08 NOTE — PLAN OF CARE
Problem: Goal Outcome Summary  Goal: Goal Outcome Summary  Outcome: No Change  VSS. Disoriented x4. Incontinent of B&B. Assistance of 2 with cares. Continues on IV Unasyn.

## 2017-05-08 NOTE — PLAN OF CARE
Problem: Goal Outcome Summary  Goal: Goal Outcome Summary  PT: Pt attempted at 1200. Pt had just received meal and family was assisting with feeding. Will attempt back later as time/schedule allows, otherwise pt will be scheduled for tomorrow.

## 2017-05-08 NOTE — PROGRESS NOTES
[facility]    Hospitalist Progress Note    Date of Service (when I saw the patient): 05/08/2017    Assessment & Plan     83 year old male with PMH including anxiety, dementia who presents from his memory care facility with shortness of breath found to have fever, leukocytosis and subtle RML infiltrate in the setting of a suspected recent aspiration episode. He is being admitted for treatment of aspiration pneumonia and also has a right eye conjunctivitis which appears bacterial.     Assessment and Plan:     # Sepsis due to RML pneumonia, suspected to be aspiration.  Presented with productive cough, dyspnea and subtle RML infiltrate with a mild leukocytosis. Report of aspiration episode while eating a couple of days prior to admission.     -- Continue IV Unasyn   -- Follow blood cultures - NGTD  -- supplemental O2 as needed and await culture results.   -- SLP evaluation , on modified diet   -- Aspiration precautions, ensure patient is upright for meals    # Unilateral conjunctivitis: right eye. this does seem to be somewhat purulent and may be bacterial. In addition to systemic antibiotics, will continue polytrim eye drops.   -- Improving     #  Dementia with behavioral issues: lives in memory care unit.  Has some behavioral issues at baseline.  Will take meds crushed in pudding generally  -- May have component of acute encephalopathy from infection on top of dementia - resolving, more awake now, getting close to baseline, per family he is always sleepy in morning  -- PTA medications include cymbalta, namenda, and seroquel , resume as mentation allows     -give seroquel after meals instead of before so he is not too sedated for eating and decrease the risk of aspiration  -does better with seroquel prn for agitation and would prefer using this to haldol if able.        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 Although again for last one month has been using only the lift. His daughter would like a trial of therapies , PT/OT consulted      DVT Prophylaxis: Enoxaparin (Lovenox) SQ    Code Status: Full Code    Disposition: Expected discharge in 2 days once able to wean off oxygen and mentation stable     Updated daughter Lisa and spouse at bedside. Discussed with family about guarded prognosis, this is the second episodes of aspiration pneumonia in last six month and appears that his dementia is advancing, daughter able to recognize that but hopes for restorative cares and \"do what we can\". Will ask for a palliative care consult.     Awilda Garciaman    Interval History   Chart reviewed and patient seen, discussed with nursing staff, patient more awake, denies any pain, was able to eat in decent amounts last night, family wondering about advancement of diet    -Data reviewed today: I reviewed all new labs and imaging results over the last 24 hours. I personally reviewed no images or EKG's today.    Physical Exam   Temp: 99.9  F (37.7  C) Temp src: Axillary BP: 162/69 Pulse: 69   Resp: 18 SpO2: 90 % O2 Device: Nasal cannula Oxygen Delivery: 2 LPM  Vitals:    05/05/17 1717   Weight: 86.2 kg (190 lb 1.6 oz)     Vital Signs with Ranges  Temp:  [96.6  F (35.9  C)-99.9  F (37.7  C)] 99.9  F (37.7  C)  Pulse:  [65-85] 69  Resp:  [18-20] 18  BP: (140-162)/(59-69) 162/69  SpO2:  [88 %-92 %] 90 %  I/O last 3 completed shifts:  In: 1866 [P.O.:340; I.V.:1526]  Out: -       Constitutional: Awake., sitting in chair, working with therapies , right eye swelling and redness looks better    Respiratory: Coarse sounds in right lung, no crackles or wheezing noted,   Cardiovascular: Regular rate and rhythm, " normal S1 and S2, no loud murmur, rubs or gallops noted   Abdomen: Bowel sounds present, soft, non-distended, non-tender   Skin: No exanthems noted on exposed areas, no cyanosis, dry to touch   Neuro: Awake, confused, moving all extremities , able to converse   Extremities: No pitting edema,  skin is warm to touch with signs of adequate peripheral perfusion    Psychiatric: Confused              Medications     NaCl 50 mL/hr at 05/07/17 1509       multivitamin, therapeutic with minerals  1 tablet Oral Daily     enoxaparin  40 mg Subcutaneous Q24H     trimethoprim-polymyxin b  2 drop Right Eye 4x Daily     ampicillin-sulbactam (UNASYN) IV  3 g Intravenous Q6H     DULoxetine  30 mg Oral Daily     memantine (NAMENDA) tablet 10 mg  10 mg Oral Daily     tamsulosin  0.4 mg Oral QPM     QUEtiapine (SEROquel) half-tab 12.5 mg  12.5 mg Oral Daily with lunch     QUEtiapine  25 mg Oral QAM     QUEtiapine  25 mg Oral At Bedtime     QUEtiapine  12.5 mg Oral Daily with supper       Data   All new lab data and imaging results from today have been reviewed       Recent Labs  Lab 05/07/17  1555 05/06/17  0743 05/05/17  1358   WBC 7.8 10.0 11.7*   HGB 9.5* 9.5* 9.9*   * 100 100    215 233    141 140   POTASSIUM 3.7 3.7 4.2   CHLORIDE 109 106 106   CO2 29 28 30   BUN 17 18 24   CR 0.90 0.92 1.11   ANIONGAP 4 7 4   ARANZA 8.6 8.7 9.1   * 88 98   ALBUMIN  --   --  3.0*   PROTTOTAL  --   --  7.2   BILITOTAL  --   --  1.0   ALKPHOS  --   --  54   ALT  --   --  18   AST  --   --  18       No results found for this or any previous visit (from the past 24 hour(s)).

## 2017-05-09 ENCOUNTER — APPOINTMENT (OUTPATIENT)
Dept: PHYSICAL THERAPY | Facility: CLINIC | Age: 82
DRG: 871 | End: 2017-05-09
Payer: MEDICARE

## 2017-05-09 ENCOUNTER — APPOINTMENT (OUTPATIENT)
Dept: SPEECH THERAPY | Facility: CLINIC | Age: 82
DRG: 871 | End: 2017-05-09
Payer: MEDICARE

## 2017-05-09 LAB
CREAT SERPL-MCNC: 0.89 MG/DL (ref 0.66–1.25)
GFR SERPL CREATININE-BSD FRML MDRD: 81 ML/MIN/1.7M2
MAGNESIUM SERPL-MCNC: 2 MG/DL (ref 1.6–2.3)
PLATELET # BLD AUTO: 308 10E9/L (ref 150–450)
POTASSIUM SERPL-SCNC: 3.4 MMOL/L (ref 3.4–5.3)

## 2017-05-09 PROCEDURE — 85049 AUTOMATED PLATELET COUNT: CPT | Performed by: INTERNAL MEDICINE

## 2017-05-09 PROCEDURE — 25000125 ZZHC RX 250: Performed by: INTERNAL MEDICINE

## 2017-05-09 PROCEDURE — 94640 AIRWAY INHALATION TREATMENT: CPT

## 2017-05-09 PROCEDURE — 25000128 H RX IP 250 OP 636: Performed by: INTERNAL MEDICINE

## 2017-05-09 PROCEDURE — 99232 SBSQ HOSP IP/OBS MODERATE 35: CPT | Performed by: INTERNAL MEDICINE

## 2017-05-09 PROCEDURE — 36415 COLL VENOUS BLD VENIPUNCTURE: CPT | Performed by: INTERNAL MEDICINE

## 2017-05-09 PROCEDURE — A9270 NON-COVERED ITEM OR SERVICE: HCPCS | Mod: GY | Performed by: INTERNAL MEDICINE

## 2017-05-09 PROCEDURE — 99358 PROLONG SERVICE W/O CONTACT: CPT | Performed by: CLINICAL NURSE SPECIALIST

## 2017-05-09 PROCEDURE — 99233 SBSQ HOSP IP/OBS HIGH 50: CPT | Performed by: CLINICAL NURSE SPECIALIST

## 2017-05-09 PROCEDURE — 40000275 ZZH STATISTIC RCP TIME EA 10 MIN

## 2017-05-09 PROCEDURE — 97530 THERAPEUTIC ACTIVITIES: CPT | Mod: GP

## 2017-05-09 PROCEDURE — 25000132 ZZH RX MED GY IP 250 OP 250 PS 637: Mod: GY | Performed by: INTERNAL MEDICINE

## 2017-05-09 PROCEDURE — 94640 AIRWAY INHALATION TREATMENT: CPT | Mod: 76

## 2017-05-09 PROCEDURE — 40000193 ZZH STATISTIC PT WARD VISIT

## 2017-05-09 PROCEDURE — 12000000 ZZH R&B MED SURG/OB

## 2017-05-09 PROCEDURE — 83735 ASSAY OF MAGNESIUM: CPT | Performed by: INTERNAL MEDICINE

## 2017-05-09 PROCEDURE — 84132 ASSAY OF SERUM POTASSIUM: CPT | Performed by: INTERNAL MEDICINE

## 2017-05-09 PROCEDURE — 82565 ASSAY OF CREATININE: CPT | Performed by: INTERNAL MEDICINE

## 2017-05-09 PROCEDURE — 40000225 ZZH STATISTIC SLP WARD VISIT

## 2017-05-09 PROCEDURE — 40000274 ZZH STATISTIC RCP CONSULT EA 30 MIN

## 2017-05-09 PROCEDURE — 92526 ORAL FUNCTION THERAPY: CPT | Mod: GN

## 2017-05-09 RX ORDER — IPRATROPIUM BROMIDE AND ALBUTEROL SULFATE 2.5; .5 MG/3ML; MG/3ML
3 SOLUTION RESPIRATORY (INHALATION)
Status: DISCONTINUED | OUTPATIENT
Start: 2017-05-09 | End: 2017-05-11

## 2017-05-09 RX ADMIN — AMPICILLIN SODIUM AND SULBACTAM SODIUM 3 G: 2; 1 INJECTION, POWDER, FOR SOLUTION INTRAMUSCULAR; INTRAVENOUS at 20:44

## 2017-05-09 RX ADMIN — ENOXAPARIN SODIUM 40 MG: 40 INJECTION SUBCUTANEOUS at 14:47

## 2017-05-09 RX ADMIN — IPRATROPIUM BROMIDE AND ALBUTEROL SULFATE 3 ML: .5; 3 SOLUTION RESPIRATORY (INHALATION) at 19:38

## 2017-05-09 RX ADMIN — POLYMYXIN B SULFATE AND TRIMETHOPRIM 2 DROP: 1; 10000 SOLUTION OPHTHALMIC at 09:06

## 2017-05-09 RX ADMIN — POLYMYXIN B SULFATE AND TRIMETHOPRIM 2 DROP: 1; 10000 SOLUTION OPHTHALMIC at 16:23

## 2017-05-09 RX ADMIN — AMPICILLIN SODIUM AND SULBACTAM SODIUM 3 G: 2; 1 INJECTION, POWDER, FOR SOLUTION INTRAMUSCULAR; INTRAVENOUS at 09:06

## 2017-05-09 RX ADMIN — ACETAMINOPHEN 650 MG: 325 TABLET, FILM COATED ORAL at 06:19

## 2017-05-09 RX ADMIN — QUETIAPINE FUMARATE 25 MG: 25 TABLET, FILM COATED ORAL at 21:54

## 2017-05-09 RX ADMIN — IPRATROPIUM BROMIDE AND ALBUTEROL SULFATE 3 ML: .5; 3 SOLUTION RESPIRATORY (INHALATION) at 15:51

## 2017-05-09 RX ADMIN — SODIUM CHLORIDE: 9 INJECTION, SOLUTION INTRAVENOUS at 17:10

## 2017-05-09 RX ADMIN — AMPICILLIN SODIUM AND SULBACTAM SODIUM 3 G: 2; 1 INJECTION, POWDER, FOR SOLUTION INTRAMUSCULAR; INTRAVENOUS at 14:47

## 2017-05-09 RX ADMIN — TAMSULOSIN HYDROCHLORIDE 0.4 MG: 0.4 CAPSULE ORAL at 21:55

## 2017-05-09 RX ADMIN — POLYMYXIN B SULFATE AND TRIMETHOPRIM 2 DROP: 1; 10000 SOLUTION OPHTHALMIC at 12:25

## 2017-05-09 RX ADMIN — AMPICILLIN SODIUM AND SULBACTAM SODIUM 3 G: 2; 1 INJECTION, POWDER, FOR SOLUTION INTRAMUSCULAR; INTRAVENOUS at 02:06

## 2017-05-09 NOTE — PLAN OF CARE
Problem: Goal Outcome Summary  Goal: Goal Outcome Summary  Outcome: Therapy, progress toward functional goals as expected  SLP: Pt seen for dysphagia tx while upright in chair. Pt lethargic but was agreeable to participate in ST session. Initial trials of PO resulted in pt orally holding bolus (~10 seconds) which required consistent verbal cues to swallow; however as ST session progressed pt with improved swallow response time and with no overt s/sx of aspiration on nectar thick liquids via cup and semisolid textures. ST session limited by pts level of fatigue. Recommend continue dysphagia diet 2 and nectar thick liquids with 1:1 supervision. Pt should be fully upright and alert for all PO, take small single sips/bties, alternate consistencies, and pace self. Hold PO should pt demonstrate overt s/sx of aspiration or if pt is not alert enough to safely take PO. ST to continue to follow for diet tolerance and advanced trials as appropriate. Anticipate pt will require ongoing ST for dysphagia upon discharge to next level of care.

## 2017-05-09 NOTE — PLAN OF CARE
Problem: Goal Outcome Summary  Goal: Goal Outcome Summary  Confused incoherent garbled speech resistive to cares.  Temp 101.8 oral tylenol given hospitalist web pagged according to parmaters  In the order.  incontent of urine no non verbal signs of pain.  Followed by speech on a DD2 diet with nectar thick liquids.  bloody nose this shift Started humidified O2.  LS diminished inspiratory wheeze infrequent cough productive at times.

## 2017-05-09 NOTE — PLAN OF CARE
Problem: Goal Outcome Summary  Goal: Goal Outcome Summary  Outcome: No Change  Vital signs stable.  Confused, sleeping on and off.  Denies pain, has some bruising to arms, legs.  Lungs diminished, on O2 at 2 lpm nasal cannula, occasional non productive cough.  Incontinent of urine, incontinence protocol in use .Turned repositioned.  Fed at supper by daughter.  Takes pills crushed with apple sauce or pudding.  Diet, dysphagia level 2 with nectar thick liquids.  Bed alarm on.Plan of care reviewed with pt and daughter.

## 2017-05-09 NOTE — PROGRESS NOTES
[facility]    Hospitalist Progress Note    Date of Service (when I saw the patient): 05/09/2017    Assessment & Plan     83 year old male with PMH including anxiety, dementia who presents from his memory care facility with shortness of breath found to have fever, leukocytosis and subtle RML infiltrate in the setting of a suspected recent aspiration episode. He is being admitted for treatment of aspiration pneumonia and also has a right eye conjunctivitis which appears bacterial.     Assessment and Plan:     # Sepsis due to RML pneumonia, suspected to be aspiration.  Presented with productive cough, dyspnea and subtle RML infiltrate with a mild leukocytosis. Report of aspiration episode while eating a couple of days prior to admission.     -- Continue IV Unasyn , had episode of fever last night, still requiring oxygen, ?repeat aspiration? Will obtain a repeat CXR  -- Follow blood cultures - NGTD  -- supplemental O2 as needed, wean off as tolerated   -- SLP evaluation , on modified diet   -- Aspiration precautions, ensure patient is upright for meals    -- Concern for sedation with medications, please see discussion below     # Unilateral conjunctivitis: right eye. this does seem to be somewhat purulent and may be bacterial. In addition to systemic antibiotics, will continue polytrim eye drops.   -- much improved      #  Dementia with behavioral issues: lives in memory care unit.  Has some behavioral issues at baseline.  Will take meds crushed in pudding generally. give seroquel after meals instead of before so he is not too sedated for eating and decrease the risk of aspiration. -does better with seroquel prn for agitation and would prefer using this to haldol if able.        -- May have component of acute encephalopathy from infection on top of dementia - resolving  -- PTA medications include cymbalta, namenda, and seroquel , resume as mentation allows     -- discussed with daughter about Seroquel  dosing, it has been held frequently in hospital due to sedation. Per daughter, it is scheduled at memory care as it works better with his schedule there instead of PRN. She agrees with decreasing morning dose to 12.5 mg.     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 Although again for last one month has been using only the lift. His daughter would like a trial of therapies , PT/OT consulted     Goals of care: Guarded prognosis although family hoping for restorative cares, palliative care consulted, dtr hoping that his diet can be advanced as ice cream is one of the few things that he enjoys     DVT Prophylaxis: Enoxaparin (Lovenox) SQ    Code Status: Full Code    Disposition: Expected discharge - with fever last night , anticipate couple more days once able to wean off oxygen and mentation stable     Updated daughter Lisa over the phone. She is requesting order for PT/OT at discharge and also order for memory care to ensure patient is upright with meals     Tauseef Ur Rony    Interval History   Chart reviewed and patient seen, discussed with nursing staff, patient awake, but confused, denies any pain    -Data reviewed today: I reviewed all new labs and imaging results over the last 24 hours. I personally reviewed no images or EKG's today.    Physical Exam   Temp: 97.1  F (36.2  C) Temp src: Axillary BP: 142/54 Pulse: 59   Resp: 16 SpO2: 94 % O2 Device: Nasal cannula with humidification Oxygen Delivery: 3 LPM  Vitals:    05/05/17 1717   Weight: 86.2 kg (190 lb 1.6 oz)     Vital Signs with Ranges  Temp:  [97.1  F (36.2  C)-101.8  F (38.8  C)] 97.1  F (36.2  C)  Pulse:  [59-68] 59  Resp:   [16-18] 16  BP: (142-177)/(54-73) 142/54  SpO2:  [86 %-94 %] 94 %  I/O last 3 completed shifts:  In: 2186 [P.O.:780; I.V.:1406]  Out: -       Constitutional: Awake.,  right eye swelling and redness looks better    Respiratory: Coarse sounds in right lung, no crackles or wheezing noted,   Cardiovascular: Regular rate and rhythm, normal S1 and S2, no loud murmur, rubs or gallops noted   Abdomen: Bowel sounds present, soft, non-distended, non-tender   Skin: No exanthems noted on exposed areas, no cyanosis, dry to touch   Neuro: Awake, confused, moving all extremities , able to converse, mumbles   Extremities: No pitting edema,  skin is warm to touch with signs of adequate peripheral perfusion    Psychiatric: Confused              Medications     NaCl 50 mL/hr at 05/09/17 1044       ipratropium - albuterol 0.5 mg/2.5 mg/3 mL  3 mL Nebulization 4x daily     [START ON 5/10/2017] QUEtiapine  12.5 mg Oral QAM     multivitamin, therapeutic with minerals  1 tablet Oral Daily     enoxaparin  40 mg Subcutaneous Q24H     trimethoprim-polymyxin b  2 drop Right Eye 4x Daily     ampicillin-sulbactam (UNASYN) IV  3 g Intravenous Q6H     DULoxetine  30 mg Oral Daily     memantine (NAMENDA) tablet 10 mg  10 mg Oral Daily     tamsulosin  0.4 mg Oral QPM     QUEtiapine (SEROquel) half-tab 12.5 mg  12.5 mg Oral Daily with lunch     QUEtiapine  25 mg Oral At Bedtime     QUEtiapine  12.5 mg Oral Daily with supper       Data   All new lab data and imaging results from today have been reviewed       Recent Labs  Lab 05/09/17  0710 05/07/17  1555 05/06/17  0743 05/05/17  1358   WBC  --  7.8 10.0 11.7*   HGB  --  9.5* 9.5* 9.9*   MCV  --  101* 100 100    282 215 233   NA  --  142 141 140   POTASSIUM 3.4 3.7 3.7 4.2   CHLORIDE  --  109 106 106   CO2  --  29 28 30   BUN  --  17 18 24   CR 0.89 0.90 0.92 1.11   ANIONGAP  --  4 7 4   ARANZA  --  8.6 8.7 9.1   GLC  --  109* 88 98   ALBUMIN  --   --   --  3.0*   PROTTOTAL  --   --   --  7.2    BILITOTAL  --   --   --  1.0   ALKPHOS  --   --   --  54   ALT  --   --   --  18   AST  --   --   --  18       No results found for this or any previous visit (from the past 24 hour(s)).

## 2017-05-09 NOTE — CONSULTS
"Care Transitions Team: Following for CC, discharge planning, and disposition.        Per chart review pt. has admission from January 2017 for PNA + UTI, current admission, Asp. PNA.   Palli consulted for goals of care.     [facility].   KODY RUIZ IS CONTACT AT [facility]--[phone]    Daughter, Lisa explains that staff at [facility] have been using a lift to transfer him and he has a wheel chair.  At baseline he goes to meals in dinning room, spends a lot of time sleeping as of last month.    Lisa explains that in January \"he seemed to bounce back rather quickly from his illness, he had physical therapy and actually started walking some, just seemed more up beat\"  Lisa goes on to explain \"when that was over he started to decline again.\" \"Just less interactive and sleeping more\"     Lisa expresses understanding that she may be witnessing a general failure to thrive and has had hospice in back of mind \"of course\" just not quite ready to \"let go\".  Lisa explains that current goal will be to return to [facility] with SLP and PT to \"see how he does\"  If he is declining and not wanting to participate or eat \"then I guess then we will need to revisit the Hospice\"  Lisa identifies these services and contacts for these services provided through [facility].     Lisa requests HE to be arranged for transportation when pt is medically stable for discharge.  Pt does not have 02 at [facility].    Will continue to follow and be  In contact with dtr Lisa and Jose Sanabria for coordination of return when medically stable.     Mar Salazar RN BSN CTS  Care Transitions Team  [phone]    "

## 2017-05-09 NOTE — PLAN OF CARE
Problem: Goal Outcome Summary  Goal: Goal Outcome Summary     PT: Patient seen by physical therapy for treatment.  Patient roused with tactile cueing.  Patient unable to follow single step directions; transferred to sitting at EOB with max A.  Patient tolerated sitting for 2 min, then reclined backwards onto the bed even with direct verbal cueing to sit at EOB.  Patient transferred to bedside chair with overhead lift; up in bedside chair at end of session.  Recommend DC to Memory Care Unit with full assist for cares and mobility.

## 2017-05-09 NOTE — PLAN OF CARE
Problem: Pneumonia (Adult)  Goal: Signs and Symptoms of Listed Potential Problems Will be Absent or Manageable (Pneumonia)  Signs and symptoms of listed potential problems will be absent or manageable by discharge/transition of care (reference Pneumonia (Adult) CPG).   Outcome: No Change  Disoriented x4. History of dementia. Lives in memory care.  Sleeping most of shift. Seroquel held today due to lethargy.  Vital signs monitored. Requiring 3L oxygen today.   Lung sounds diminished throughout. Infrequent, nonproductive cough. Scheduled nebulizers started today.  Bowel sounds hypoactive.   Incontinent of bowel, bladder.   Assist x2, mechanical lift. Currently up in chair.   Speech therapy and palliative care following pt.

## 2017-05-09 NOTE — PROGRESS NOTES
[facility]  Palliative Care Progress Note  Text Page     Assessment & Plan   I was asked to see the patient and family for goals of care.     Recommendations:  1. Delirium - Seroquel as per Hospitalist team. Appreciate nursing help to achieve routine with cares and activities, use sensory aides, promote sleep at night and awake during the day. Cymbalta and Namenda restarted 5/6 by Hospitalists.  2. Dyspnea - Promote good pulmonary toilet, position for comfort, breathing, oxygen as previously ordered, offer fan prn. Position for optimal safety and alertness with eating. Will hold on opioid for dyspnea prn for now. Nebs as per Hospitalists.     Goal of Care: Full code with restorative goal. Received copy of [facility] 5/9/2017 - copy on chart and will have copy sent to [facility] to be scanned. Reviewed POLST from [facility] [place] that is identical to POLST on file for [facility] from 1/2017. Dtr Lisa and wife Nuvia have information on CPR, breathing help, and POLST options for care. Lisa wishes to discuss with brother Loyd when he is in town this weekend. They will follow up with provider at [facility] if pt is discharged before this time.      Disease Process/es & Symptoms:  Eder Pearson is a 83 year old patient admitted with symptoms of shortness of breath with fever and leukocytosis. He has been treated for aspiration PNA and confusion.      This is in the setting of anxiety, dementia, prostate cancer and chronic kidney disease. He has been hospitalized Jan, 2017 for pneumonia and UTI, and now with aspiration PNA. There is a documented unitentional weight loss of 25 pounds over the past 12 months.     The following symptoms are noted by family as concerning to pt's quality of life.  Dyspnea - improving with treatment for PNA.  Drowsiness - attributed to seroquel, pneumonia illness, changes to daily routines.  Deconditioning - dtr Lisa reports that pt was limited in his ability to rehab at one  point in [facility] stay at [facility], but in the last month had been walking and improving some of his physical strength with PT.     Psychosocial/Spiritual Needs:     Oriented to Spiritual Health and Social Work Services as part of Palliative Care team. SWS is following pt. Referral for Spiritual Health.   Pt is Mosque.     Decision-Making & Goals of Care:  Discussion/counseling today about goals of care/decisions:   5/9/17 Met with dtr Lisa and wife Nuvia in pt room. Pt had been more sedated this am, but after being put into a chair by pt, pt has periods where he opens his eyes, follows some commands, and then drifts back to sleep. Wife Nuvia informs me several times during conversation that Lisa is the family spokesperson regarding pt.  Dtr's concerns were addressed via phone call to Dr. Uribe regarding adjusting pt's seroquel, ensuring that PT is ordered for pt at discharge to [facility]. Lisa and wife acknowledge that they are emotional and retell stories of the pt. Lisa reiterates that the family currently believe that pt has quality of life, and potential to improve physically. Reviewed their understanding of pt condition, reviewed possible options to support their wish for pt, including DNR/DNI, and/or supportive care, and/or comfort care, and artificial nutrition. Lisa and Nuvia were given information regarding CPR, breathing support, and an example polst with explanation of each care decision discussed. Lisa is leaning toward DNR/DNI with supportive care, but wants to review with her borther Loyd when he comes back from [place] to visit on Sunday. Reviewed current POLST and process to have the POLST updated at [facility], as well as how to access HOSPICE at any point for additional support.   Lisa does not want to make changes to his status until after discussion with her brother this weekend. They are open to  visit and appreciative of SWS and CC assistance with discharge plan back to [facility]  "when pt is stable.  5/8/17 Met with dtr Lisa and wife Nuvia in pt room. They retell pt history with dementia and most recent hospitalizations. They believe pt has quality of life when he as at baseline because of his ability to interact with his 3 year old grandson when Loyd, pt's son, comes with his family from [place] every 4-6 weeks. They are hopeful that pt can recover to his baseline and return to memory care. Lisa states that this hospitalization for PNA was \"easier\" than the last one in January. They visit pt at [facility] every day, and dtr Lisa helps him with eating, physical activity. Lisa acknowledged that they are aware of their dad's decline over time. Lisa verbalizes that she sees pts at the [facility] that do not talk, do not have visitors, do not have any interaction with others, they are just taken care of and fed, day after day. She does not want pt to be like that when the time comes, but prior to this admission, her dad was improving in his physical strength. She, her mom and her siblings are talking about code status and care, and she can repeat to me the recommendations that the Hospitalists have told her about code status vs. Supportive or comfort care. \"That is a big change and we would want to call 911 and bring him back to the hospital if his breathing got bad or he was in distress\". Lisa states that she has reviewed information about cpr on the intranet, but was ok with me giving her and her mom information to review about cpr, respiratory support and decision-making. Lisa and Nuvia agreed to discuss further and review POLST tomorrow. Lisa will bring in a copy of pt's HCD.     Patient has decision-making capacity Unreliable  Patient has a Health Care Agent named in a legal Advance Directive document dated 11/5/1997.  Name: Nuvia Pearson, Relationship: spouse, Phone(s):[phone]  First Alternate: Lisa Michel, Relationship: daughter, Phone(s): [phone](home), [phone] " (cell, primary).  Second Alternate: Sarah Pearson, Relationship: daughter. Phone # not obtained.        Patient has a completed health care directive available in the chart (Y/N)Y   Physician orders for life-sustaining treatment (POLST) form is on file  Code Status: Full code      Findings & plan of care discussed with: Lisa, dtr, and Nuvia, wife. Will follow up with Dr. Uribe in am. Updated [facility] SWS.  Follow-up plan from palliative team: Will continue to follow this pt and family for symptom management and goals of care support.  Thank you for involving us in the patient's care.      Jessica VELA, CNS  Pain Management and Palliative Care  [facility]  Pgr: [phone]    Time Spent on this Encounter   I spent 75 minutes in assessment of the patient and discussion with the patient and family. Another 20 minutes in review of chart, documentation and discussion with the health care team.  Face to face meeting with dtr Lisa and wife Nuvia 2:30-3:40pm.    Interval History   Pt reported to be very sleepy this am and not able to be fed or partipate in therapies. This afternoon, however, PT was able to get pt into a recliner. After this, pt opened his eyes, wiggled his toes to command, and appears to be answering appropriately at times to yes/no questions. ST here to work with pt. Started on neb treatments today.    Review of Systems    C: POSITIVE for change in weight, fever  E/M: NEGATIVE for ear, mouth and throat problems  R: NEGATIVE for significant cough or SOB  CV: NEGATIVE for chest pain, palpitations or peripheral edema  GI: BM    Palliative Symptom Review (0=no symptom/no concern, 1=mild, 2=moderate, 3=severe):      Pain: JANELL - appears comfortable.      Fatigue: JANELL      Nausea: JANELL, eats. No apparent signs of nausea.      Constipation: 0-none      Diarrhea: 0-none      Depressive Symptoms: JANELL      Anxiety: JANELL      Drowsiness: 2-moderate      Poor Appetite: 0-none      Shortness of  Breath: 0-none per observation.      Insomnia: 0-none      Other:Agitation 0-none      Overall (0 good/no concerns, 3 very poor):  2    Physical Exam   Temp:  [97.1  F (36.2  C)-101.8  F (38.8  C)] 97.1  F (36.2  C)  Pulse:  [59-68] 59  Resp:  [16-18] 16  BP: (142-177)/(54-73) 142/54  SpO2:  [86 %-94 %] 94 %  190 lbs 1.6 oz  GEN:  More alert, oriented x 1, appears comfortable, NAD.  HEENT:  Normocephalic/atraumatic, no scleral icterus, no nasal discharge, mouth moist.  CV:  RRR, S1, S2; no murmurs or other irregularities noted.  +3 DP/PT pulses bilatererally; no edema BLE.  RESP:  Clear to auscultation anteriorly bilaterally without rales/rhonchi/wheezing/retractions.  Symmetric chest rise on inhalation noted.  Normal respiratory effort.  ABD:  Rounded, soft, non-tender/non-distended.  +BS  EXT:  Edema & pulses as noted above.  CMS intact x 4.     M/S:   Non-Tender to palpation.    SKIN:  Dry to touch, no exanthems noted in the visualized areas.    NEURO: Symmetric strength +5/5.  Sensation appears to be intact.   There is no area of allodynia or hyperesthesia.  PAIN BEHAVIOR: Cooperative with some commands.  Psych:  Calm, cooperative with some commands, responses to yes no seem appropriate at times.     Medications     NaCl 50 mL/hr at 05/09/17 1044       ipratropium - albuterol 0.5 mg/2.5 mg/3 mL  3 mL Nebulization 4x daily     multivitamin, therapeutic with minerals  1 tablet Oral Daily     enoxaparin  40 mg Subcutaneous Q24H     trimethoprim-polymyxin b  2 drop Right Eye 4x Daily     ampicillin-sulbactam (UNASYN) IV  3 g Intravenous Q6H     DULoxetine  30 mg Oral Daily     memantine (NAMENDA) tablet 10 mg  10 mg Oral Daily     tamsulosin  0.4 mg Oral QPM     QUEtiapine (SEROquel) half-tab 12.5 mg  12.5 mg Oral Daily with lunch     QUEtiapine  25 mg Oral QAM     QUEtiapine  25 mg Oral At Bedtime     QUEtiapine  12.5 mg Oral Daily with supper       Data   Results for orders placed or performed during the hospital  "encounter of 05/05/17 (from the past 24 hour(s))   Social Work IP Consult    Narrative    Mar Salazar RN     5/9/2017 11:55 AM  Care Transitions Team: Following for CC, discharge planning, and   disposition.        Per chart review pt. has admission from January 2017 for PNA +   UTI, current admission, Asp. PNA.   Palli consulted for goals of care.     [facility].     Daughter, Lisa explains that staff at [facility] have been using a lift   to transfer him and he has a wheel chair.  At baseline he goes to   meals in dinning room, spends a lot of time sleeping as of last   month.    Lisa explains that in January \"he seemed to bounce back rather   quickly from his illness, he had physical therapy and actually   started walking some, just seemed more up beat\"  Lisa goes on to   explain \"when that was over he started to decline again.\" \"Just   less interactive and sleeping more\"     Lisa expresses understanding that she may be witnessing a   general failure to thrive and has had hospice in back of mind \"of   course\" just not quite ready to \"let go\".  Lisa explains that current goal will be [facility] with   [facility] and PT to \"see how he does\"  If he is declining and not   wanting to participate or eat \"then I guess then we will need to   revisit the Hospice\"  Lisa identifies these services and   contacts for these services provided through [facility].     Lisa requests HE to be arranged for transportation when pt is   medically stable for discharge.  Pt does not have 02 at [facility].    Will continue to follow and be  In contact with dtr Lisa and   Jose Sanabria for coordination of return when medically   stable.     Mar Salazar RN BSN CTS  Care Transitions Team  [phone]     Creatinine   Result Value Ref Range    Creatinine 0.89 0.66 - 1.25 mg/dL    GFR Estimate 81 >60 mL/min/1.7m2    GFR " "Estimate If Black >90   GFR Calc   >60 mL/min/1.7m2   Platelet count   Result Value Ref Range    Platelet Count 308 150 - 450 10e9/L   Potassium   Result Value Ref Range    Potassium 3.4 3.4 - 5.3 mmol/L   Magnesium   Result Value Ref Range    Magnesium 2.0 1.6 - 2.3 mg/dL   Care Coordinator IP Consult    Narrative    Mar Salazar, RN     5/9/2017 11:55 AM  Care Transitions Team: Following for CC, discharge planning, and   disposition.        Per chart review pt. has admission from January 2017 for PNA +   UTI, current admission, Asp. PNA.   Palli consulted for goals of care.     Per chart review [facility].     Daughter, Lisa explains that staff at [facility] have been using a lift   to transfer him and he has a wheel chair.  At baseline he goes to   meals in dinning room, spends a lot of time sleeping as of last   month.    Lisa explains that in January \"he seemed to bounce back rather   quickly from his illness, he had physical therapy and actually   started walking some, just seemed more up beat\"  Lisa goes on to   explain \"when that was over he started to decline again.\" \"Just   less interactive and sleeping more\"     Lisa expresses understanding that she may be witnessing a   general failure to thrive and has had hospice in back of mind \"of   course\" just not quite ready to \"let go\".  Lisa explains that current goal will be to return to [facility] with   SLP and PT to \"see how he does\"  If he is declining and not   wanting to participate or eat \"then I guess then we will need to   revisit the Hospice\"  Lisa identifies these services and   contacts for these services provided through [facility].     Lisa requests HE to be arranged for transportation when pt is   medically stable for discharge.  Pt does not have 02 at [facility].    Will continue to follow and be  In contact with dtr Lisa and   Daniele, " Jose for coordination of return when medically   stable.     Mar Salazar RN BSN [facility]  Care Transitions Team  [phone]

## 2017-05-09 NOTE — PROGRESS NOTES
"[facility] RCAT     Date: 5/9/17  Admission Dx: Aspiration PNA  Pulmonary History; HAP   Home Nebulizer/MDI Use: No  Home Oxygen: No  Acuity Level (RCAT flow sheet): 3    Aerosol Therapy initiated: Duoneb QID       Pulmonary Hygiene initiated: Deep breathing exercise       Volume Expansion initiated: IS      Current Oxygen Requirements: 3L NC  Current SpO2: 94%    Re-evaluation date: 5/12/17    Patient Education: Patient's familiar with his breathing treatment.       See \"RT Assessments\" flow sheet for patient assessment scoring and Acuity Level Details.             "

## 2017-05-10 ENCOUNTER — APPOINTMENT (OUTPATIENT)
Dept: OCCUPATIONAL THERAPY | Facility: CLINIC | Age: 82
DRG: 871 | End: 2017-05-10
Payer: MEDICARE

## 2017-05-10 ENCOUNTER — APPOINTMENT (OUTPATIENT)
Dept: GENERAL RADIOLOGY | Facility: CLINIC | Age: 82
DRG: 871 | End: 2017-05-10
Attending: INTERNAL MEDICINE
Payer: MEDICARE

## 2017-05-10 LAB
ANION GAP SERPL CALCULATED.3IONS-SCNC: 7 MMOL/L (ref 3–14)
BUN SERPL-MCNC: 11 MG/DL (ref 7–30)
CALCIUM SERPL-MCNC: 8.4 MG/DL (ref 8.5–10.1)
CHLORIDE SERPL-SCNC: 110 MMOL/L (ref 94–109)
CO2 SERPL-SCNC: 28 MMOL/L (ref 20–32)
CREAT SERPL-MCNC: 0.83 MG/DL (ref 0.66–1.25)
ERYTHROCYTE [DISTWIDTH] IN BLOOD BY AUTOMATED COUNT: 17.4 % (ref 10–15)
GFR SERPL CREATININE-BSD FRML MDRD: 88 ML/MIN/1.7M2
GLUCOSE SERPL-MCNC: 96 MG/DL (ref 70–99)
HCT VFR BLD AUTO: 26.6 % (ref 40–53)
HGB BLD-MCNC: 8.4 G/DL (ref 13.3–17.7)
MAGNESIUM SERPL-MCNC: 1.8 MG/DL (ref 1.6–2.3)
MCH RBC QN AUTO: 31.6 PG (ref 26.5–33)
MCHC RBC AUTO-ENTMCNC: 31.6 G/DL (ref 31.5–36.5)
MCV RBC AUTO: 100 FL (ref 78–100)
PLATELET # BLD AUTO: 311 10E9/L (ref 150–450)
POTASSIUM SERPL-SCNC: 3.3 MMOL/L (ref 3.4–5.3)
POTASSIUM SERPL-SCNC: 3.4 MMOL/L (ref 3.4–5.3)
RBC # BLD AUTO: 2.66 10E12/L (ref 4.4–5.9)
SODIUM SERPL-SCNC: 145 MMOL/L (ref 133–144)
WBC # BLD AUTO: 8.5 10E9/L (ref 4–11)

## 2017-05-10 PROCEDURE — 94640 AIRWAY INHALATION TREATMENT: CPT | Mod: 76

## 2017-05-10 PROCEDURE — 25000125 ZZHC RX 250: Performed by: INTERNAL MEDICINE

## 2017-05-10 PROCEDURE — 25000128 H RX IP 250 OP 636: Performed by: INTERNAL MEDICINE

## 2017-05-10 PROCEDURE — 12000007 ZZH R&B INTERMEDIATE

## 2017-05-10 PROCEDURE — 40000133 ZZH STATISTIC OT WARD VISIT: Performed by: STUDENT IN AN ORGANIZED HEALTH CARE EDUCATION/TRAINING PROGRAM

## 2017-05-10 PROCEDURE — 94640 AIRWAY INHALATION TREATMENT: CPT

## 2017-05-10 PROCEDURE — 80048 BASIC METABOLIC PNL TOTAL CA: CPT | Performed by: INTERNAL MEDICINE

## 2017-05-10 PROCEDURE — 83735 ASSAY OF MAGNESIUM: CPT | Performed by: INTERNAL MEDICINE

## 2017-05-10 PROCEDURE — 36415 COLL VENOUS BLD VENIPUNCTURE: CPT | Performed by: INTERNAL MEDICINE

## 2017-05-10 PROCEDURE — A9270 NON-COVERED ITEM OR SERVICE: HCPCS | Mod: GY | Performed by: INTERNAL MEDICINE

## 2017-05-10 PROCEDURE — 85027 COMPLETE CBC AUTOMATED: CPT | Performed by: INTERNAL MEDICINE

## 2017-05-10 PROCEDURE — 99233 SBSQ HOSP IP/OBS HIGH 50: CPT | Performed by: INTERNAL MEDICINE

## 2017-05-10 PROCEDURE — 71010 XR CHEST PORT 1 VW: CPT

## 2017-05-10 PROCEDURE — 40000275 ZZH STATISTIC RCP TIME EA 10 MIN

## 2017-05-10 PROCEDURE — 99231 SBSQ HOSP IP/OBS SF/LOW 25: CPT | Performed by: CLINICAL NURSE SPECIALIST

## 2017-05-10 PROCEDURE — 84132 ASSAY OF SERUM POTASSIUM: CPT | Performed by: INTERNAL MEDICINE

## 2017-05-10 PROCEDURE — 97535 SELF CARE MNGMENT TRAINING: CPT | Mod: GO | Performed by: STUDENT IN AN ORGANIZED HEALTH CARE EDUCATION/TRAINING PROGRAM

## 2017-05-10 PROCEDURE — 25000132 ZZH RX MED GY IP 250 OP 250 PS 637: Mod: GY | Performed by: INTERNAL MEDICINE

## 2017-05-10 RX ORDER — FUROSEMIDE 10 MG/ML
20 INJECTION INTRAMUSCULAR; INTRAVENOUS ONCE
Status: COMPLETED | OUTPATIENT
Start: 2017-05-10 | End: 2017-05-10

## 2017-05-10 RX ADMIN — Medication 12.5 MG: at 09:26

## 2017-05-10 RX ADMIN — POTASSIUM CHLORIDE 20 MEQ: 1.5 POWDER, FOR SOLUTION ORAL at 09:26

## 2017-05-10 RX ADMIN — QUETIAPINE FUMARATE 25 MG: 25 TABLET, FILM COATED ORAL at 22:22

## 2017-05-10 RX ADMIN — IPRATROPIUM BROMIDE AND ALBUTEROL SULFATE 3 ML: .5; 3 SOLUTION RESPIRATORY (INHALATION) at 07:24

## 2017-05-10 RX ADMIN — POLYMYXIN B SULFATE AND TRIMETHOPRIM 2 DROP: 1; 10000 SOLUTION OPHTHALMIC at 19:36

## 2017-05-10 RX ADMIN — POTASSIUM CHLORIDE 20 MEQ: 1.5 POWDER, FOR SOLUTION ORAL at 14:30

## 2017-05-10 RX ADMIN — MEMANTINE 10 MG: 5 TABLET ORAL at 09:26

## 2017-05-10 RX ADMIN — POLYMYXIN B SULFATE AND TRIMETHOPRIM 2 DROP: 1; 10000 SOLUTION OPHTHALMIC at 09:27

## 2017-05-10 RX ADMIN — AMPICILLIN SODIUM AND SULBACTAM SODIUM 3 G: 2; 1 INJECTION, POWDER, FOR SOLUTION INTRAMUSCULAR; INTRAVENOUS at 09:24

## 2017-05-10 RX ADMIN — POLYMYXIN B SULFATE AND TRIMETHOPRIM 2 DROP: 1; 10000 SOLUTION OPHTHALMIC at 12:28

## 2017-05-10 RX ADMIN — IPRATROPIUM BROMIDE AND ALBUTEROL SULFATE 3 ML: .5; 3 SOLUTION RESPIRATORY (INHALATION) at 11:51

## 2017-05-10 RX ADMIN — ENOXAPARIN SODIUM 40 MG: 40 INJECTION SUBCUTANEOUS at 14:31

## 2017-05-10 RX ADMIN — IPRATROPIUM BROMIDE AND ALBUTEROL SULFATE 3 ML: .5; 3 SOLUTION RESPIRATORY (INHALATION) at 20:09

## 2017-05-10 RX ADMIN — TAMSULOSIN HYDROCHLORIDE 0.4 MG: 0.4 CAPSULE ORAL at 20:40

## 2017-05-10 RX ADMIN — POLYMYXIN B SULFATE AND TRIMETHOPRIM 2 DROP: 1; 10000 SOLUTION OPHTHALMIC at 15:33

## 2017-05-10 RX ADMIN — IPRATROPIUM BROMIDE AND ALBUTEROL SULFATE 3 ML: .5; 3 SOLUTION RESPIRATORY (INHALATION) at 15:55

## 2017-05-10 RX ADMIN — TAZOBACTAM SODIUM AND PIPERACILLIN SODIUM 3.38 G: 375; 3 INJECTION, SOLUTION INTRAVENOUS at 17:27

## 2017-05-10 RX ADMIN — TAZOBACTAM SODIUM AND PIPERACILLIN SODIUM 3.38 G: 375; 3 INJECTION, SOLUTION INTRAVENOUS at 12:28

## 2017-05-10 RX ADMIN — DULOXETINE HYDROCHLORIDE 30 MG: 30 CAPSULE, DELAYED RELEASE ORAL at 09:27

## 2017-05-10 RX ADMIN — FUROSEMIDE 20 MG: 10 INJECTION, SOLUTION INTRAVENOUS at 15:59

## 2017-05-10 RX ADMIN — MULTIPLE VITAMINS W/ MINERALS TAB 1 TABLET: TAB at 09:26

## 2017-05-10 RX ADMIN — AMPICILLIN SODIUM AND SULBACTAM SODIUM 3 G: 2; 1 INJECTION, POWDER, FOR SOLUTION INTRAMUSCULAR; INTRAVENOUS at 01:40

## 2017-05-10 NOTE — PROGRESS NOTES
SPIRITUAL HEALTH SERVICES Progress Note  [facility] 602  [facility] visit due to Epic consult order for prayer for pt.  Pt was sleeping and didn't appear to wake, writer offered prayer.  [facility] will cont to follow.       Calixto Farias  Staff    [phone]

## 2017-05-10 NOTE — PROGRESS NOTES
[facility]  Palliative Care Progress Note  Text Page     Assessment & Plan   I was asked to see the patient and family for goals of care.     Recommendations:  1. Delirium - Seroquel as per Hospitalist team. Appreciate nursing help to achieve routine with cares and activities, use sensory aides, promote sleep at night and awake during the day. Cymbalta and Namenda restarted 5/6 by Hospitalists.  2. Dyspnea - Promote good pulmonary toilet, position for comfort, breathing, oxygen as previously ordered, offer fan prn. Position for optimal safety and alertness with eating. Will hold on opioid for dyspnea prn for now. Nebs as per Hospitalists.     Goal of Care: Full code with restorative goal. Received copy of HCD 5/9/2017 - copy on chart and will have copy sent to [facility] to be scanned. Reviewed POLST from [facility] ([facility] 2/24/15. Dtr Lisa and wife Nuvia have information on CPR, breathing help, and POLST options for care. Lisa wishes to discuss with brother Loyd when he is in town this weekend. They will follow up with provider at [facility] if pt is discharged before this time.      Disease Process/es & Symptoms:  Eder Pearson is a 83 year old patient admitted with symptoms of shortness of breath with fever and leukocytosis. He has been treated for aspiration PNA and confusion.      This is in the setting of anxiety, dementia, prostate cancer and chronic kidney disease. He has been hospitalized Jan, 2017 for pneumonia and UTI, and now with aspiration PNA. There is a documented unitentional weight loss of 25 pounds over the past 12 months.     The following symptoms are noted by family as concerning to pt's quality of life.  Dyspnea - improving with treatment for PNA.  Drowsiness - attributed to seroquel, pneumonia illness, changes to daily routines.  Deconditioning - dtr Lisa reports that pt was limited in his ability to rehab at one point in his stay at [facility]CU, but in the last month had  been walking and improving some of his physical strength with PT.     Psychosocial/Spiritual Needs:     Oriented to Spiritual Health and Social Work Services as part of Palliative Care team. SWS is following pt. Referral for Spiritual Health 5/9.   Pt is Oriental orthodox.     Decision-Making & Goals of Care:  Discussion/counseling today about goals of care/decisions:   5/10/17 No family present this am. Pt alert this am, similar to last evening. Laughed, some short verbal responses, following a few simple commands, and then drifts off to sleep. Noted period of agitation last night. POC continues to be to return to memory care with PT at discharge. Dtr Lisa and wife Nuvia would like to talk with their brother Loyd when he comes back to [place] this weekend regarding pt goals of care. HCD on chart. No change to code status or POLST at present. Lisa and Nuvia are informed of how to request a change to the POLST after discharge with the [facility] group after discharge. They are also informed how to access HOSPICE after discharge. Have been given information on CPR and Respiratory support for their reference. Appreciate SS and CC support with discharge planning. Updated Dr. Uribe.  5/9/17 Met with dtr Lisa and wife Nuvia in pt room. Pt had been more sedated this am, but after being put into a chair by pt, pt has periods where he opens his eyes, follows some commands, and then drifts back to sleep. Wife Nuvia informs me several times during conversation that Lisa is the family spokesperson regarding pt.  Dtr's concerns were addressed via phone call to Dr. Uribe regarding adjusting pt's seroquel, ensuring that PT is ordered for pt at discharge to [facility] care. Lisa and wife acknowledge that they are emotional and retell stories of the pt. Lisa reiterates that the family currently believe that pt has quality of life, and potential to improve physically. Reviewed their understanding of pt condition, reviewed possible options to  "support their wish for pt, including DNR/DNI, and/or supportive care, and/or comfort care, and artificial nutrition. Lisa and Nuvia were given information regarding CPR, breathing support, and an example polst with explanation of each care decision discussed. Lisa is leaning toward DNR/DNI with supportive care, but wants to review with her borther Loyd when he comes back from [place] to visit on Sunday. Reviewed current POLST and process to have the POLST updated at [facility], as well as how to access HOSPICE at any point for additional support.   Lisa does not want to make changes to his status until after discussion with her brother this weekend. They are open to  visit and appreciative of SWS and CC assistance with discharge plan back to [facility] when pt is stable.  5/8/17 Met with dtr Lisa and wife Nuvia in pt room. They retell pt history with dementia and most recent hospitalizations. They believe pt has quality of life when he as at baseline because of his ability to interact with his 3 year old grandson when Loyd, pt's son, comes with his family from [place] every 4-6 weeks. They are hopeful that pt can recover to his baseline and return to [facility] care. Lisa states that this hospitalization for PNA was \"easier\" than the last one in January. They visit pt at [facility] every day, and dtr Lisa helps him with eating, physical activity. Lisa acknowledged that they are aware of their dad's decline over time. Lisa verbalizes that she sees pts at the [facility] that do not talk, do not have visitors, do not have any interaction with others, they are just taken care of and fed, day after day. She does not want pt to be like that when the time comes, but prior to this admission, her dad was improving in his physical strength. She, her mom and her siblings are talking about code status and care, and she can repeat to me the recommendations that the Hospitalists have told her about code status " "vs. Supportive or comfort care. \"That is a big change and we would want to call 911 and bring him back to the hospital if his breathing got bad or he was in distress\". Lisa states that she has reviewed information about cpr on the intranet, but was ok with me giving her and her mom information to review about cpr, respiratory support and decision-making. Lisa and Nuvia agreed to discuss further and review POLST tomorrow. iLsa will bring in a copy of pt's HCD.     Patient has decision-making capacity Unreliable  Patient has a Health Care Agent named in a legal Advance Directive document dated 11/5/1997.  Name: Nuvia Pearson, Relationship: spouse, Phone(s):[phone]  First Alternate: Lisa Pearson, Relationship: daughter, Phone(s): [phone](home), [phone] (cell, primary).  Second Alternate: Sarah Pearson, Relationship: daughter. Phone # not obtained.        Patient has a completed health care directive available in the chart (Y/N)Yes. Physician orders for life-sustaining treatment (POLST) form is on file - yes.   Code Status: Full code      Findings & plan of care discussed with: Bedside RN, CC,  Dr. Uribe.  Follow-up plan from palliative team: Will continue to follow this pt and family for symptom management and goals of care support.  Thank you for involving us in the patient's care.      Jessica VELA, CNS  Pain Management and Palliative Care  [facility]  Pgr: [phone]    Time Spent on this Encounter   I spent 5 minutes in assessment of the patient and discussion with the patient and family. Another 10 minutes in review of chart, documentation and discussion with the health care team.    Interval History   Pt more awake this am. Having portable cxr. Verbal responses. Following some basic commands, then drifts off to sleep.  Noted to have episode of agitation last night. Breathing easy. No cough, but still on oxygen.     Review of Systems    C: POSITIVE for change in weight, " NEGATIVE for chills or fever.  E/M: NEGATIVE for ear, mouth and throat problems  R: NEGATIVE for significant cough or SOB  CV: NEGATIVE for chest pain, palpitations or peripheral edema  GI: BM    Palliative Symptom Review (0=no symptom/no concern, 1=mild, 2=moderate, 3=severe):      Pain: JANELL - appears comfortable.      Fatigue: JANELL      Nausea: JANELL, eats. No apparent signs of nausea.      Constipation: 0-none      Diarrhea: 0-none      Depressive Symptoms: JANELL      Anxiety: JANELL      Drowsiness: 2-moderate      Poor Appetite: 0-none      Shortness of Breath: 0-none per observation.      Insomnia: 0-none      Other:Agitation 0-none      Overall (0 good/no concerns, 3 very poor):  2    Physical Exam   Temp:  [97.1  F (36.2  C)-98.8  F (37.1  C)] 98.7  F (37.1  C)  Pulse:  [61-64] 63  Resp:  [16-18] 16  BP: (126-148)/(43-62) 142/50  SpO2:  [84 %-95 %] 95 %  190 lbs 1.6 oz  GEN:  More alert, oriented x 1, appears comfortable, NAD.  HEENT:  Normocephalic/atraumatic, no scleral icterus, no nasal discharge, mouth moist.  CV:  RRR, S1, S2; no murmurs or other irregularities noted.  +3 DP/PT pulses bilatererally; no edema BLE.  RESP:  Clear to auscultation anteriorly bilaterally without rales/rhonchi/wheezing/retractions.  Symmetric chest rise on inhalation noted.  Normal respiratory effort.  ABD:  Rounded, soft, non-tender/non-distended.  +BS  EXT:  Edema & pulses as noted above.  CMS intact x 4.  Edema in hands +1 bilat.  M/S:   Non-Tender to palpation.    SKIN:  Dry to touch, no exanthems noted in the visualized areas.    NEURO: Symmetric strength +5/5.  Sensation appears to be intact.   There is no area of allodynia or hyperesthesia.  PAIN BEHAVIOR: Cooperative with some commands.  Psych:  Calm, cooperative with some commands, some verbal responses and laughter.    Medications        ipratropium - albuterol 0.5 mg/2.5 mg/3 mL  3 mL Nebulization 4x daily     QUEtiapine  12.5 mg Oral QAM     multivitamin, therapeutic with  "minerals  1 tablet Oral Daily     enoxaparin  40 mg Subcutaneous Q24H     trimethoprim-polymyxin b  2 drop Right Eye 4x Daily     ampicillin-sulbactam (UNASYN) IV  3 g Intravenous Q6H     DULoxetine  30 mg Oral Daily     memantine (NAMENDA) tablet 10 mg  10 mg Oral Daily     tamsulosin  0.4 mg Oral QPM     QUEtiapine (SEROquel) half-tab 12.5 mg  12.5 mg Oral Daily with lunch     QUEtiapine  25 mg Oral At Bedtime     QUEtiapine  12.5 mg Oral Daily with supper       Data   Results for orders placed or performed during the hospital encounter of 05/05/17 (from the past 24 hour(s))   Care Coordinator IP Consult    Narrative    Mar Salazar RN     5/9/2017 11:55 AM  Care Transitions Team: Following for CC, discharge planning, and   disposition.        Per chart review pt. has admission from January 2017 for PNA +   UTI, current admission, Asp. PNA.   Palli consulted for goals of care.     Per chart review [facility].     Daughter, Lisa explains that staff at [facility] have been using a lift   to transfer him and he has a wheel chair.  At baseline he goes to   meals in dinning room, spends a lot of time sleeping as of last   month.    Lisa explains that in January \"he seemed to bounce back rather   quickly from his illness, he had physical therapy and actually   started walking some, just seemed more up beat\"  Lisa goes on to   explain \"when that was over he started to decline again.\" \"Just   less interactive and sleeping more\"     Lisa expresses understanding that she may be witnessing a   general failure to thrive and has had hospice in back of mind \"of   course\" just not quite ready to \"let go\".  Lisa explains that current goal will be to [facility] with   SLP and PT to \"see how he does\"  If he is declining and not   wanting to participate or eat \"then I guess then we will need to   revisit the Hospice\"  Lisa identifies " these services and   contacts for these services provided through [facility].     Lisa requests [facility] to be arranged for transportation when pt is   medically stable for discharge.  [facility] not have [facility] at [facility].    Will continue to follow and be  In contact with dtr Lisa and   Jose Sanabria for coordination of return when medically   stable.     Mar Salazar RN BSN CTS  Care Transitions Team  [phone]     CBC with platelets   Result Value Ref Range    WBC 8.5 4.0 - 11.0 10e9/L    RBC Count 2.66 (L) 4.4 - 5.9 10e12/L    Hemoglobin 8.4 (L) 13.3 - 17.7 g/dL    Hematocrit 26.6 (L) 40.0 - 53.0 %     78 - 100 fl    MCH 31.6 26.5 - 33.0 pg    MCHC 31.6 31.5 - 36.5 g/dL    RDW 17.4 (H) 10.0 - 15.0 %    Platelet Count 311 150 - 450 10e9/L   Basic metabolic panel   Result Value Ref Range    Sodium 145 (H) 133 - 144 mmol/L    Potassium 3.3 (L) 3.4 - 5.3 mmol/L    Chloride 110 (H) 94 - 109 mmol/L    Carbon Dioxide 28 20 - 32 mmol/L    Anion Gap 7 3 - 14 mmol/L    Glucose 96 70 - 99 mg/dL    Urea Nitrogen 11 7 - 30 mg/dL    Creatinine 0.83 0.66 - 1.25 mg/dL    GFR Estimate 88 >60 mL/min/1.7m2    GFR Estimate If Black >90   GFR Calc   >60 mL/min/1.7m2    Calcium 8.4 (L) 8.5 - 10.1 mg/dL   Magnesium   Result Value Ref Range    Magnesium 1.8 1.6 - 2.3 mg/dL

## 2017-05-10 NOTE — PLAN OF CARE
Problem: Goal Outcome Summary  Goal: Goal Outcome Summary  Outcome: No Change  Pt. Responding well to questions this AM.  Able to state name and .  Had AM meds, then sleeping most of day.  Awakens easily with gentle touch or voice.  Now seroquel decreased to HS dosing.  K replaced for 3.3.  Unable to give on time, due to his sleepiness.  Recheck .  Incontinent of urine x1.  While sleeping, 02 to 88%.  Increased to 5L, then back to 4L now, becoming more awake.  Skin is intact, bruised.

## 2017-05-10 NOTE — PLAN OF CARE
Problem: Goal Outcome Summary  Goal: Goal Outcome Summary  OT: Pt in bed sleeping however able to awaken to name verbally; pt somewhat alert and conversing illogically, smiling, able to follow simple verbal cue and tactile stimulus for grasping washcloth and completing facial hygiene task; pt provided with comb and required hand held assist to grasp and initiate task of combing hair  Recommend return to memory care facility with assist for ADLs

## 2017-05-10 NOTE — PROGRESS NOTES
Care Transitions Team: Following for CC, discharge planning, and disposition.      Per chart review identified on going needs for home 02. Contact made to JOSEPH Soliz at [facility] who explains that pt is not on home 02 there but that they could accommodate this if needed, at an extra charge to family. [facility] prefers [facility] for 02 needs.    Update provided to Heydi who relays likely she will need to come and reassess for return to [facility].    RN CC explained daily updated to be provided to her.    Mar Salazar RN BSN CTS  Care Transitions Team  [phone]

## 2017-05-10 NOTE — PLAN OF CARE
Problem: Goal Outcome Summary  Goal: Goal Outcome Summary  Outcome: No Change  Disoriented x 4, history of dementia.  Vital signs stable.  Lungs diminished throughout, on O2 at 3 lpm nasal cannula, occasional non productive cough.  Bowel sounds hyperactive, incontinent of urine, incontinence protocol in use.  Up in chair this evening, using mechanical lift.  Fed at supper.  Palliative care consult done,family supportive.  Iv Unasyn.  Bed alarm on.  Plan of care reviewed with pt.and family.

## 2017-05-10 NOTE — PROGRESS NOTES
[facility]    Hospitalist Progress Note    Date of Service (when I saw the patient): 05/10/2017    Assessment & Plan     83 year old male with PMH including anxiety, dementia who presents from his memory care facility with shortness of breath found to have fever, leukocytosis and subtle RML infiltrate in the setting of a suspected recent aspiration episode. He is being admitted for treatment of aspiration pneumonia and also has a right eye conjunctivitis. During course of hospital stay, patient's lethargy has improved, although still requiring supplemental oxygen. Had episode of fever on 5/9. Repeat CXR shows mild worsening of pneumonia. ABx changed from unasyn to Piperacillin/Tazobactam  Today.      Assessment and Plan:     # Sepsis due to RML pneumonia, suspected to be aspiration.  Presented with productive cough, dyspnea and subtle RML infiltrate with a mild leukocytosis. Report of aspiration episode while eating a couple of days prior to admission.     -- On IV Unasyn , given CXR findings today, will broaden to Piperacillin/Tazobactam    -  Blood cultures - NGTD  -- supplemental O2 as needed, wean off as tolerated   -- SLP evaluation , on modified diet   -- Aspiration precautions, ensure patient is upright for meals  -- Concern for sedation with medications used for his dementia, please see discussion below, wonder about another episode of aspiration causing fever although clinically looks better  -- Check BNP to screen for CHF, one time IV lasix     # Unilateral conjunctivitis: right eye. this was felt to be somewhat purulent and may be bacterial. In addition to systemic antibiotics, will continue polytrim eye drops.   -- much improved      #  Dementia with behavioral issues: lives in memory care unit.  Has some behavioral issues at baseline.  Will take meds crushed in pudding generally.  -does better with seroquel prn for agitation and would prefer using this to haldol if able.        -Suspect he  had component of acute encephalopathy from infection on top of dementia , on admission - that appears to have resolved.     -- discussed with daughter in detail about Seroquel dosing, at his memory he takes it 12.5 three times in day and 25 mg at HS. Per daughter, the facility prefers the scheduled dose as it works with his schedule and they have difficulty giving PRN once he gets worked up. She initially requested that we try decreasing the morning dose and see if it helps. However, in hospital, most days his day time dose has been held, and the days that he received it in morning, he tends to be more tired and lethargic. At this time, I would favour stopping all scheduled Seroquel in day, and just use the 25 mg scheduled at night, with PRN available. Daughter is in agreement.     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 Although again for last one month has been using only the lift. His daughter would like a trial of therapies , PT/OT consulted     Goals of care: Guarded prognosis although family hoping for restorative cares, palliative care consulted, appreciate the consult, dtr hoping that his diet can be advanced as ice cream is one of the few things that he enjoys. Daughter is going to speak more with her bother before making further decisions about code status     DVT Prophylaxis: Enoxaparin (Lovenox) SQ    Code Status: Full Code    Disposition: Expected discharge - 2-3 days once fever resolved and mentation improved, able to wean off oxygen     Updated daughter Lisa and spouse at bedside.     Tauseef Ur Rony    Interval History   Chart reviewed and  patient seen, discussed with nursing staff, patient once again tired today after Seroquel, despite the lower dose, able to answer questions but not very interactive, denies any pain    -Data reviewed today: I reviewed all new labs and imaging results over the last 24 hours. I personally reviewed no images or EKG's today.    Physical Exam   Temp: 98.7  F (37.1  C) Temp src: Axillary BP: 142/50 Pulse: 63   Resp: 16 SpO2: 94 % O2 Device: Nasal cannula Oxygen Delivery: 3 LPM  Vitals:    05/05/17 1717   Weight: 86.2 kg (190 lb 1.6 oz)     Vital Signs with Ranges  Temp:  [98.2  F (36.8  C)-98.8  F (37.1  C)] 98.7  F (37.1  C)  Pulse:  [61-64] 63  Resp:  [16-18] 16  BP: (126-148)/(43-62) 142/50  SpO2:  [84 %-95 %] 94 %  I/O last 3 completed shifts:  In: 629 [P.O.:150; I.V.:479]  Out: -       Constitutional: Awake.,  right eye swelling and redness looks markedly better    Respiratory: Coarse sounds in right lung, no crackles or wheezing noted,   Cardiovascular: Regular rate and rhythm, normal S1 and S2, no loud murmur, rubs or gallops noted   Abdomen: Bowel sounds present, soft, non-distended, non-tender   Skin: No exanthems noted on exposed areas, no cyanosis, dry to touch   Neuro: Awake, confused, moving all extremities , able to converse, mumbles   Extremities: No pitting edema,  skin is warm to touch with signs of adequate peripheral perfusion    Psychiatric: Confused              Medications        piperacillin-tazobactam  3.375 g Intravenous Q6H     furosemide  20 mg Intravenous Once     ipratropium - albuterol 0.5 mg/2.5 mg/3 mL  3 mL Nebulization 4x daily     multivitamin, therapeutic with minerals  1 tablet Oral Daily     enoxaparin  40 mg Subcutaneous Q24H     trimethoprim-polymyxin b  2 drop Right Eye 4x Daily     DULoxetine  30 mg Oral Daily     memantine (NAMENDA) tablet 10 mg  10 mg Oral Daily     tamsulosin  0.4 mg Oral QPM     QUEtiapine  25 mg Oral At Bedtime       Data   All new lab data and imaging  results from today have been reviewed       Recent Labs  Lab 05/10/17  0705 05/09/17  0710 05/07/17  1555 05/06/17  0743 05/05/17  1358   WBC 8.5  --  7.8 10.0 11.7*   HGB 8.4*  --  9.5* 9.5* 9.9*     --  101* 100 100    308 282 215 233   *  --  142 141 140   POTASSIUM 3.3* 3.4 3.7 3.7 4.2   CHLORIDE 110*  --  109 106 106   CO2 28  --  29 28 30   BUN 11  --  17 18 24   CR 0.83 0.89 0.90 0.92 1.11   ANIONGAP 7  --  4 7 4   ARANZA 8.4*  --  8.6 8.7 9.1   GLC 96  --  109* 88 98   ALBUMIN  --   --   --   --  3.0*   PROTTOTAL  --   --   --   --  7.2   BILITOTAL  --   --   --   --  1.0   ALKPHOS  --   --   --   --  54   ALT  --   --   --   --  18   AST  --   --   --   --  18       Recent Results (from the past 24 hour(s))   XR Chest Port 1 View    Narrative    CHEST PORTABLE ONE VIEW  5/10/2017 9:23 AM     HISTORY: Re-evaluate new fever.     COMPARISON: Chest x-ray 5/5/2017.      Impression    IMPRESSION: Portable view of the chest is performed. There is a new  patchy infiltrate at the lung bases concerning for pneumonia. Lungs  are hypoaerated which is unchanged. Heart appears mildly prominent in  size, but is unchanged. No pneumothorax. Probable trace bilateral  pleural effusions. Subtle underlying CHF is also possible.    SAMIA ROBLERO MD

## 2017-05-10 NOTE — PROGRESS NOTES
CLINICAL NUTRITION SERVICES - REASSESSMENT NOTE      EVALUATION OF PROGRESS TOWARD GOALS   Diet Order / Food intake and Liquid meal replacement or supplement - Ability to advance diet per SLP and respective intake (% of meals, supplements)  Update/Evaluation: Diet upgraded to DD2 + NTL per SLP 5/8.  Patient continues with Magic Shake supplement with meals and standard trays (not appropriate for room service).  Per flowsheet review, patient consuming variable amounts,  0-100%, of meals since last RD assessment.  Of note, pt requires feeding assistance.  Per discussion with LPN/RN, patient only took bites of breakfast and lunch today prior to refusal d/t poor appetite.  RN also reported component of lethargy impacting oral intakes.  Likely meeting <75% needs, or less, x 5 days since admission.        Dosing Weight: 86.2 kg (admit weight)     ASSESSED NUTRITION NEEDS (PER APPROVED PRACTICE GUIDELINES):  Estimated Energy Needs: 1996-3256 kcals (20-25 Kcal/Kg)  Justification: maintenance / limited activity   Estimated Protein Needs:  grams protein (1-1.2 g pro/Kg)  Justification: maintenance  Estimated Fluid Needs: 4037-3078 mL (1 mL/Kcal)  Justification: maintenance      NEW FINDINGS:   - Palliative following, full code restorative goals.   - Medications reviewed:   Daily MVI/M --> not consistently receiving d/t decreased alertness and lethargy/sedation  -  5/9.    - No updated weight since admission.     Previous Goals:   Pt to consistently consume >/=50% of meals TID + 2 supplements/day  Evaluation: Not met    Previous Nutrition Diagnosis:   Predicted suboptimal nutrient intake (protein-energy) related to swallowing difficulties secondary to dysphagia and weakness   Evaluation: Declining, changed below      MALNUTRITION  % Weight Loss:  None noted (during admit)  % Intake:  Decreased intake does not meet criteria for malnutrition   Subcutaneous Fat Loss:  None observed though limited as pt sleeping  Muscle  Loss:  Temporal region mild depletion  Fluid Retention:  None noted    Malnutrition Diagnosis: Patient does not meet two of the above criteria necessary for diagnosing malnutrition though is at risk      CURRENT NUTRITION DIAGNOSIS  Inadequate oral intake related to decreased appetite and self-feeding difficulties with dementia, with component of lethargy/overmedication as evidenced by meeting <75% needs x 5 days since admission.     INTERVENTIONS  Recommendations / Nutrition Prescription  Continue with diet as recommended by SLP, currently DD2 + NTL    Continue with standard trays.      Change to scheduled Magic Shakes between meals and add offerings of Magic Cup with meals     Appreciate Nursing staff providing support and encouragement at meal times.     Ok to continue with daily MVI/M.      Further nutrition-related interventions pending improvement in alertness.      Implementation  Medical Food Supplement: As above.      Collaboration and Referral of Nutrition care: Discussed POC and oral intakes with RN/LPN.      Goals  Patient to consume at least 50% of meals TID + 1 supplement daily.      MONITORING AND EVALUATION:  Diet Order, Food intake and Liquid meal replacement or supplement - Changes per SLP, adequacy.        Mecca Lynn RD, LD  Clinical Dietitian  3rd floor/ICU: [phone]  All other floors: [phone]  Weekend/holiday: [phone]

## 2017-05-11 ENCOUNTER — APPOINTMENT (OUTPATIENT)
Dept: SPEECH THERAPY | Facility: CLINIC | Age: 82
DRG: 871 | End: 2017-05-11
Payer: MEDICARE

## 2017-05-11 ENCOUNTER — APPOINTMENT (OUTPATIENT)
Dept: PHYSICAL THERAPY | Facility: CLINIC | Age: 82
DRG: 871 | End: 2017-05-11
Payer: MEDICARE

## 2017-05-11 LAB
ANION GAP SERPL CALCULATED.3IONS-SCNC: 7 MMOL/L (ref 3–14)
BACTERIA SPEC CULT: NO GROWTH
BACTERIA SPEC CULT: NO GROWTH
BUN SERPL-MCNC: 10 MG/DL (ref 7–30)
CALCIUM SERPL-MCNC: 8.5 MG/DL (ref 8.5–10.1)
CHLORIDE SERPL-SCNC: 105 MMOL/L (ref 94–109)
CO2 SERPL-SCNC: 30 MMOL/L (ref 20–32)
CREAT SERPL-MCNC: 0.92 MG/DL (ref 0.66–1.25)
ERYTHROCYTE [DISTWIDTH] IN BLOOD BY AUTOMATED COUNT: 17.2 % (ref 10–15)
GFR SERPL CREATININE-BSD FRML MDRD: 79 ML/MIN/1.7M2
GLUCOSE BLDC GLUCOMTR-MCNC: 104 MG/DL (ref 70–99)
GLUCOSE SERPL-MCNC: 95 MG/DL (ref 70–99)
HCT VFR BLD AUTO: 26.5 % (ref 40–53)
HGB BLD-MCNC: 8.3 G/DL (ref 13.3–17.7)
Lab: NORMAL
MAGNESIUM SERPL-MCNC: 1.9 MG/DL (ref 1.6–2.3)
MCH RBC QN AUTO: 30.9 PG (ref 26.5–33)
MCHC RBC AUTO-ENTMCNC: 31.3 G/DL (ref 31.5–36.5)
MCV RBC AUTO: 99 FL (ref 78–100)
MICRO REPORT STATUS: NORMAL
MICRO REPORT STATUS: NORMAL
NT-PROBNP SERPL-MCNC: 2878 PG/ML (ref 0–1800)
PLATELET # BLD AUTO: 335 10E9/L (ref 150–450)
POTASSIUM SERPL-SCNC: 3.2 MMOL/L (ref 3.4–5.3)
POTASSIUM SERPL-SCNC: 3.6 MMOL/L (ref 3.4–5.3)
PROCALCITONIN SERPL-MCNC: NORMAL NG/ML
RBC # BLD AUTO: 2.69 10E12/L (ref 4.4–5.9)
SODIUM SERPL-SCNC: 142 MMOL/L (ref 133–144)
SPECIMEN SOURCE: NORMAL
SPECIMEN SOURCE: NORMAL
WBC # BLD AUTO: 8.1 10E9/L (ref 4–11)

## 2017-05-11 PROCEDURE — 99233 SBSQ HOSP IP/OBS HIGH 50: CPT | Performed by: CLINICAL NURSE SPECIALIST

## 2017-05-11 PROCEDURE — 36415 COLL VENOUS BLD VENIPUNCTURE: CPT | Performed by: INTERNAL MEDICINE

## 2017-05-11 PROCEDURE — 40000225 ZZH STATISTIC SLP WARD VISIT

## 2017-05-11 PROCEDURE — 99232 SBSQ HOSP IP/OBS MODERATE 35: CPT | Performed by: INTERNAL MEDICINE

## 2017-05-11 PROCEDURE — 80048 BASIC METABOLIC PNL TOTAL CA: CPT | Performed by: INTERNAL MEDICINE

## 2017-05-11 PROCEDURE — 83735 ASSAY OF MAGNESIUM: CPT | Performed by: INTERNAL MEDICINE

## 2017-05-11 PROCEDURE — 25000132 ZZH RX MED GY IP 250 OP 250 PS 637: Mod: GY | Performed by: INTERNAL MEDICINE

## 2017-05-11 PROCEDURE — A9270 NON-COVERED ITEM OR SERVICE: HCPCS | Mod: GY | Performed by: INTERNAL MEDICINE

## 2017-05-11 PROCEDURE — 00000146 ZZHCL STATISTIC GLUCOSE BY METER IP

## 2017-05-11 PROCEDURE — 84145 PROCALCITONIN (PCT): CPT | Performed by: INTERNAL MEDICINE

## 2017-05-11 PROCEDURE — 97530 THERAPEUTIC ACTIVITIES: CPT | Mod: GP | Performed by: PHYSICAL THERAPIST

## 2017-05-11 PROCEDURE — 94640 AIRWAY INHALATION TREATMENT: CPT

## 2017-05-11 PROCEDURE — 83880 ASSAY OF NATRIURETIC PEPTIDE: CPT | Performed by: INTERNAL MEDICINE

## 2017-05-11 PROCEDURE — 94640 AIRWAY INHALATION TREATMENT: CPT | Mod: 76

## 2017-05-11 PROCEDURE — 84132 ASSAY OF SERUM POTASSIUM: CPT | Performed by: INTERNAL MEDICINE

## 2017-05-11 PROCEDURE — 85027 COMPLETE CBC AUTOMATED: CPT | Performed by: INTERNAL MEDICINE

## 2017-05-11 PROCEDURE — 92526 ORAL FUNCTION THERAPY: CPT | Mod: GN

## 2017-05-11 PROCEDURE — 12000000 ZZH R&B MED SURG/OB

## 2017-05-11 PROCEDURE — 40000275 ZZH STATISTIC RCP TIME EA 10 MIN

## 2017-05-11 PROCEDURE — 25000125 ZZHC RX 250: Performed by: INTERNAL MEDICINE

## 2017-05-11 PROCEDURE — 25000128 H RX IP 250 OP 636: Performed by: INTERNAL MEDICINE

## 2017-05-11 PROCEDURE — 40000193 ZZH STATISTIC PT WARD VISIT: Performed by: PHYSICAL THERAPIST

## 2017-05-11 RX ORDER — IPRATROPIUM BROMIDE AND ALBUTEROL SULFATE 2.5; .5 MG/3ML; MG/3ML
3 SOLUTION RESPIRATORY (INHALATION) EVERY 4 HOURS PRN
Status: DISCONTINUED | OUTPATIENT
Start: 2017-05-11 | End: 2017-05-13 | Stop reason: HOSPADM

## 2017-05-11 RX ADMIN — POLYMYXIN B SULFATE AND TRIMETHOPRIM 2 DROP: 1; 10000 SOLUTION OPHTHALMIC at 13:05

## 2017-05-11 RX ADMIN — POTASSIUM CHLORIDE 10 MEQ: 14.9 INJECTION, SOLUTION, CONCENTRATE PARENTERAL at 13:04

## 2017-05-11 RX ADMIN — QUETIAPINE FUMARATE 25 MG: 25 TABLET, FILM COATED ORAL at 22:04

## 2017-05-11 RX ADMIN — POTASSIUM CHLORIDE 10 MEQ: 14.9 INJECTION, SOLUTION, CONCENTRATE PARENTERAL at 10:46

## 2017-05-11 RX ADMIN — Medication 12.5 MG: at 18:33

## 2017-05-11 RX ADMIN — TAMSULOSIN HYDROCHLORIDE 0.4 MG: 0.4 CAPSULE ORAL at 22:03

## 2017-05-11 RX ADMIN — MULTIPLE VITAMINS W/ MINERALS TAB 1 TABLET: TAB at 08:37

## 2017-05-11 RX ADMIN — IPRATROPIUM BROMIDE AND ALBUTEROL SULFATE 3 ML: .5; 3 SOLUTION RESPIRATORY (INHALATION) at 07:16

## 2017-05-11 RX ADMIN — DULOXETINE HYDROCHLORIDE 30 MG: 30 CAPSULE, DELAYED RELEASE ORAL at 08:37

## 2017-05-11 RX ADMIN — TAZOBACTAM SODIUM AND PIPERACILLIN SODIUM 3.38 G: 375; 3 INJECTION, SOLUTION INTRAVENOUS at 11:56

## 2017-05-11 RX ADMIN — TAZOBACTAM SODIUM AND PIPERACILLIN SODIUM 3.38 G: 375; 3 INJECTION, SOLUTION INTRAVENOUS at 18:38

## 2017-05-11 RX ADMIN — TAZOBACTAM SODIUM AND PIPERACILLIN SODIUM 3.38 G: 375; 3 INJECTION, SOLUTION INTRAVENOUS at 00:39

## 2017-05-11 RX ADMIN — IPRATROPIUM BROMIDE AND ALBUTEROL SULFATE 3 ML: .5; 3 SOLUTION RESPIRATORY (INHALATION) at 12:05

## 2017-05-11 RX ADMIN — POTASSIUM CHLORIDE 10 MEQ: 14.9 INJECTION, SOLUTION, CONCENTRATE PARENTERAL at 15:05

## 2017-05-11 RX ADMIN — POTASSIUM CHLORIDE 10 MEQ: 14.9 INJECTION, SOLUTION, CONCENTRATE PARENTERAL at 16:36

## 2017-05-11 RX ADMIN — IPRATROPIUM BROMIDE AND ALBUTEROL SULFATE 3 ML: .5; 3 SOLUTION RESPIRATORY (INHALATION) at 15:13

## 2017-05-11 RX ADMIN — TAZOBACTAM SODIUM AND PIPERACILLIN SODIUM 3.38 G: 375; 3 INJECTION, SOLUTION INTRAVENOUS at 06:07

## 2017-05-11 RX ADMIN — MEMANTINE 10 MG: 5 TABLET ORAL at 08:37

## 2017-05-11 RX ADMIN — POLYMYXIN B SULFATE AND TRIMETHOPRIM 2 DROP: 1; 10000 SOLUTION OPHTHALMIC at 22:06

## 2017-05-11 RX ADMIN — ENOXAPARIN SODIUM 40 MG: 40 INJECTION SUBCUTANEOUS at 13:50

## 2017-05-11 RX ADMIN — POLYMYXIN B SULFATE AND TRIMETHOPRIM 2 DROP: 1; 10000 SOLUTION OPHTHALMIC at 16:00

## 2017-05-11 RX ADMIN — POLYMYXIN B SULFATE AND TRIMETHOPRIM 2 DROP: 1; 10000 SOLUTION OPHTHALMIC at 08:30

## 2017-05-11 NOTE — PROGRESS NOTES
[facility]    Hospitalist Progress Note    Date of Service (when I saw the patient): 05/11/2017    Assessment & Plan     83 year old male with PMH including anxiety, dementia who presents from his memory care facility with shortness of breath found to have fever, leukocytosis and subtle RML infiltrate in the setting of a suspected recent aspiration episode. He is being admitted for treatment of aspiration pneumonia and also has a right eye conjunctivitis. During course of hospital stay, patient's lethargy has improved, although still requiring supplemental oxygen. Had episode of fever on 5/9. Repeat CXR shows mild worsening of pneumonia. ABx changed from unasyn to Piperacillin/Tazobactam  Today.      Assessment and Plan:     # Sepsis due to RML infiltrate reflective of apparent aspiration.  Presented with productive cough, dyspnea and subtle RML infiltrate with a mild leukocytosis. Report of aspiration episode while eating a couple of days prior to admission.   -- Initially was on IV Unasyn and then changed to Zosyn. GIven the absence of fever (except for a single spike on 5/9/17) and with procalcitonin < 0.05, will stop abx and monitor.  -  Blood cultures - NGTD  -- supplemental O2 as needed, wean off as tolerated   -- SLP evaluation , on modified diet   -- Aspiration precautions, ensure patient is upright for meals  -- Concern for sedation with medications used for his dementia, please see discussion below, wonder about another episode of aspiration causing fever although clinically looks better  -- Check BNP to screen for CHF, one time IV lasix     # Unilateral conjunctivitis: right eye. this was felt to be somewhat purulent and may be bacterial. In addition to systemic antibiotics, will continue polytrim eye drops.   -- much improved      #  Dementia with behavioral issues: lives in memory care unit.  Has some behavioral issues at baseline.  Will take meds crushed in pudding generally.  -does  better with seroquel prn for agitation and would prefer using this to haldol if able.      -- Suspect he had component of acute encephalopathy from infection on top of dementia , on admission - that appears to have resolved.   -- at daughter's request, pt had decreased dose of Seroquel and appeared much more alert and interactive today. OK to leave other doses available PRN, but will not schedule except for HS.  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 Although again for last one month has been using only the lift. His daughter would like a trial of therapies , PT/OT consulted     Goals of care: Guarded prognosis although family hoping for restorative cares, palliative care consulted, appreciate the consult, dtr hoping that his diet can be advanced as ice cream is one of the few things that he enjoys. Daughter is going to speak more with her bother before making further decisions about code status     DVT Prophylaxis: Enoxaparin (Lovenox) SQ    Code Status: Full Code    Disposition: Expected discharge - pending stability. Would like to see his O2 need decreasing.     Pacheco Prince    Interval History   Chart reviewed and patient seen, discussed with nursing staff.    Pt essentially not interactive with me. Hx and symptom review completed with daughter who is present at bedside.  Pt appears much improved today. No much SOB or cough. More alert (though before I saw him).    -Data reviewed today: I reviewed all new labs and imaging results over the last 24 hours. I personally reviewed no images or EKG's today.    Physical Exam   Temp: 97.4  F (36.3  C) Temp src:  Axillary BP: 153/54 Pulse: 65   Resp: 18 SpO2: 96 % O2 Device: Nasal cannula with humidification Oxygen Delivery: 4 LPM  Vitals:    05/05/17 1717   Weight: 86.2 kg (190 lb 1.6 oz)     Vital Signs with Ranges  Temp:  [97.4  F (36.3  C)-98  F (36.7  C)] 97.4  F (36.3  C)  Pulse:  [60-67] 65  Resp:  [18-22] 18  BP: (139-162)/(53-66) 153/54  SpO2:  [94 %-96 %] 96 %  I/O last 3 completed shifts:  In: 440 [P.O.:440]  Out: -       Constitutional: Sleepy and speech is not coherent to me.   Respiratory: No crackles or wheezing noted. No increased WOB or asymmetric chest rise.   Cardiovascular: Regular rate and rhythm, normal S1 and S2, no loud murmur, rubs or gallops noted   Abdomen: Bowel sounds present, soft, non-distended, non-tender   Skin: No exanthems noted on exposed areas, no cyanosis, dry to touch   Neuro: Awake, confused, moving all extremities , able to converse, mumbles   Extremities: No pitting edema,  skin is warm to touch with signs of adequate peripheral perfusion    Psychiatric: Confused              Medications        sodium chloride (PF)  3 mL Intracatheter Q8H     piperacillin-tazobactam  3.375 g Intravenous Q6H     ipratropium - albuterol 0.5 mg/2.5 mg/3 mL  3 mL Nebulization 4x daily     multivitamin, therapeutic with minerals  1 tablet Oral Daily     enoxaparin  40 mg Subcutaneous Q24H     trimethoprim-polymyxin b  2 drop Right Eye 4x Daily     DULoxetine  30 mg Oral Daily     memantine (NAMENDA) tablet 10 mg  10 mg Oral Daily     tamsulosin  0.4 mg Oral QPM     QUEtiapine  25 mg Oral At Bedtime       Data    Lab/Imaging:  Results for orders placed or performed during the hospital encounter of 05/05/17 (from the past 24 hour(s))   Glucose by meter   Result Value Ref Range    Glucose 104 (H) 70 - 99 mg/dL   CBC with platelets   Result Value Ref Range    WBC 8.1 4.0 - 11.0 10e9/L    RBC Count 2.69 (L) 4.4 - 5.9 10e12/L    Hemoglobin 8.3 (L) 13.3 - 17.7 g/dL    Hematocrit 26.5 (L) 40.0 - 53.0 %    MCV 99  78 - 100 fl    MCH 30.9 26.5 - 33.0 pg    MCHC 31.3 (L) 31.5 - 36.5 g/dL    RDW 17.2 (H) 10.0 - 15.0 %    Platelet Count 335 150 - 450 10e9/L   Basic metabolic panel   Result Value Ref Range    Sodium 142 133 - 144 mmol/L    Potassium 3.2 (L) 3.4 - 5.3 mmol/L    Chloride 105 94 - 109 mmol/L    Carbon Dioxide 30 20 - 32 mmol/L    Anion Gap 7 3 - 14 mmol/L    Glucose 95 70 - 99 mg/dL    Urea Nitrogen 10 7 - 30 mg/dL    Creatinine 0.92 0.66 - 1.25 mg/dL    GFR Estimate 79 >60 mL/min/1.7m2    GFR Estimate If Black >90   GFR Calc   >60 mL/min/1.7m2    Calcium 8.5 8.5 - 10.1 mg/dL   Magnesium   Result Value Ref Range    Magnesium 1.9 1.6 - 2.3 mg/dL   NT proBNP inpatient and ED   Result Value Ref Range    N-Terminal Pro BNP Inpatient 2878 (H) 0 - 1800 pg/mL   Procalcitonin   Result Value Ref Range    Procalcitonin  ng/ml     <0.05  <0.05 ng/ml  Normal  Recommendation: Very low risk of bacterial infection.   Discourage antibiotics unless strong clinical suspicion for serious infection.     Potassium   Result Value Ref Range    Potassium 3.6 3.4 - 5.3 mmol/L

## 2017-05-11 NOTE — PLAN OF CARE
Problem: Goal Outcome Summary  Goal: Goal Outcome Summary  Outcome: Therapy, progress toward functional goals as expected  SLP: Pt seen for dysphagia f/u with family present. Pt initially alert and agreeable to PO trials. Pt tolerated nectar thick liquids via spoon and straw with no overt s/sx of aspiration. Thin liquids via cup resulted in intermittent overt s/sx of aspiration marked by wet vocal quality. As ST session progressed, pt with decreased BREANNA and unable to wake despite maximum cues from SLP and family. Educated family on diet recommendations and safe swallow strategies. Recommend continue dysphagia diet 2 and nectar thick liquids with 1:1 supervision. Pt should be fully upright and alert for all PO, take small single sips/bties, alternate consistencies, and pace self. Hold PO should pt demonstrate overt s/sx of aspiration or if pt is not alert enough to safely take PO. ST to continue to follow for diet tolerance and advanced trials as appropriate. Anticipate pt will require ongoing ST for dysphagia upon discharge to next level of care.

## 2017-05-11 NOTE — PLAN OF CARE
Problem: Goal Outcome Summary  Goal: Goal Outcome Summary  PT: Pt very sleepy throughout session but able to arouse for short bursts of time; max A x1-2 for supine <> sit; tolerated sitting EOB x6mins with variable levels of assistance from close SBA to mod/max A; attempted sitting balance/core strengthening activities but pt having difficulty staying engaged.  Returned supine. Recommend DC to Memory Care Unit with full assist for cares and mobility.

## 2017-05-11 NOTE — PLAN OF CARE
Problem: Goal Outcome Summary  Goal: Goal Outcome Summary  Confused very sleepy arouses to voice gentle touch.  incontent of urine changed and repositioned every 2 hours as tolerated. LS diminished requires 4L O2 infreq non productive cough.  Plan is to go back to memory care at discharge.

## 2017-05-11 NOTE — PROGRESS NOTES
SPIRITUAL HEALTH SERVICES Progress Note  [facility]    Visited Eder Pearson for ongoing emotional/spiritual support.  Adrian was awake but not very responsive.  His spouse Nuvia and daughter Lisa were present.  Offered reflective conversation and prayer.      Illness Narrative - Lisa reported that Adrian has a history of dementia which has worsen since their move to [place] two years ago.      Coping -   o Nuvia shared that Adrian's [employer] liliana is very important to him and requested prayer.  o She named Lisa and her other two surviving children who live in [place] as being central to their support system.  o Nuvia and Lisa attend services at [facility] in [place].      Meaning-Making - Nuvia shared a couple of stories that illustrated how their liliana has been an integral part of their 60 years of marriage.        Plan - I will refer pt to unit  requesting further provision of prayer.    Jose Armando Calvillo M.Div., [facility]  Staff   Pager [phone]

## 2017-05-11 NOTE — PLAN OF CARE
Problem: Goal Outcome Summary  Goal: Goal Outcome Summary  Outcome: No Change  Vital signs stable, pt sats 95-97 % on 4 lpm nasal cannula, desat to 88 % on 2-3 litres.  Confused, disoriented x .4  Lungs diminished throughout, scheduled nebs, occasional non productive cough.Head of bed elevated.  Pt on dysphagia diet, fed at supper.  Incontinent of urine, wears depends, incontinence care protocol in use.Turned repositioned q 2 hrly.  On iv zosyn.  Potassium recheck 3.4.  Daughter Erica called and was updated.

## 2017-05-11 NOTE — PROGRESS NOTES
[facility]  Palliative Care Progress Note  Text Page     Assessment & Plan   I was asked to see the patient and family for goals of care.     Recommendations:  1. Delirium - Seroquel as per Hospitalist team. Appreciate nursing help to achieve routine with cares and activities, use sensory aides, promote sleep at night and awake during the day. Cymbalta and Namenda restarted 5/6 by Hospitalists.   2. Dyspnea - Promote good pulmonary toilet, position for comfort, breathing, oxygen as previously ordered, offer fan prn. Position for optimal safety and alertness with eating. Will hold on opioid for dyspnea prn for now. Nebs as per Hospitalists.     Goal of Care: Full code with restorative goal. Received copy of HCD 5/9/2017 - copy on chart and will have copy sent to [facility] to be scanned. Reviewed POLST from [facility] 2/24/15. Dtr Lisa and wife Nuvia have information on CPR, breathing help, and POLST options for care. Lisa wishes to discuss with brother Loyd when he is in town this weekend. They will follow up with provider at [facility] if pt is discharged before this time.      Disease Process/es & Symptoms:  Eder Pearson is a 83 year old patient admitted with symptoms of shortness of breath with fever and leukocytosis. He has been treated for aspiration PNA and confusion.      This is in the setting of anxiety, dementia, prostate cancer and chronic kidney disease. He has been hospitalized Jan, 2017 for pneumonia and UTI, and now with aspiration PNA. There is a documented unitentional weight loss of 25 pounds over the past 12 months.     The following symptoms are noted by family as concerning to pt's quality of life.  Dyspnea - improving with treatment for PNA.  Drowsiness - attributed to seroquel, pneumonia illness, changes to daily routines.  Deconditioning - dtr Lisa reports that pt was limited in his ability to rehab at one point in his stay at TCU, but in the last month  "had been walking and improving some of his physical strength with PT.     Psychosocial/Spiritual Needs:     Oriented to Spiritual Health and Social Work Services as part of Palliative Care team. SWS is following pt. Referral for Spiritual Health .   Pt is Hindu.     Decision-Making & Goals of Care:  Discussion/counseling today about goals of care/decisions:   17 Visited with Lisa and Nuvia today. Pt's mentation and LOC is unchanged from yesterday. Much time spent discussing his medication management. Reinforced with them that it will be a balancing act to control his agitation, yet keep him awake when needed to participate in his therapies and Eat safely. Encouraged Lisa to check in with the psychiatrist that monitors his medications at [facility] as pt may need adjustments in his medications after discharge, just as the hospitalists are doing for him here, in order to find the balance he will need. Lisa is comparing her dad's medical situation with her brother's who  approximately 25 years ago of HIV related complications. Lisa and Nuvia continue to hope that pt can recover and get stronger. I encouraged them in addition to this hope, to plan and think about what will, should happen if he does not improve. Lisa feels that she has the information she needs to have a discussion with her brother this weekend when he arrives and has texted him that she wants to talk about how aggressive to be with his cares. Lisa verbalized that she wants to, and to talk about what happens the next time he has an aspiration pneumonia.  \"I knew in January that we would eventually be bringing him back with a pneumonia, but I didn't think it would be this soon\". Lisa wants to discuss pt's medications with Dr. Prince today, and criteria and timing for possible discharge. Appreciate check in by CC or SWS today with Lisa and Nuvia regarding discharge plan to [facility]. They have been following pt. and are aware of " family's goals for his care.  5/10/17 No family present this am. Pt alert this am, similar to last evening. Laughed, some short verbal responses, following a few simple commands, and then drifts off to sleep. Noted period of agitation last night. POC continues to be to return to memory care with PT at discharge. Dtr Lisa and wife Nuvia would like to talk with their brother Loyd when he comes back to [place] this weekend regarding pt goals of care. HCD on chart. No change to code status or POLST at present. Lisa and Nuvia are informed of how to request a change to the POLST after discharge with the [facility] group after discharge. They are also informed how to access HOSPICE after discharge. Have been given information on CPR and Respiratory support for their reference. Appreciate SS and CC support with discharge planning. Updated Dr. Uribe.  5/9/17 Met with dtr Lisa and wife Nuvia in pt room. Pt had been more sedated this am, but after being put into a chair by pt, pt has periods where he opens his eyes, follows some commands, and then drifts back to sleep. Wife Nuvia informs me several times during conversation that Lisa is the family spokesperson regarding pt.  Dtr's concerns were addressed via phone call to Dr. Uribe regarding adjusting pt's seroquel, ensuring that PT is ordered for pt at discharge to [facility] care. Lisa and wife acknowledge that they are emotional and retell stories of the pt. Lisa reiterates that the family currently believe that pt has quality of life, and potential to improve physically. Reviewed their understanding of pt condition, reviewed possible options to support their wish for pt, including DNR/DNI, and/or supportive care, and/or comfort care, and artificial nutrition. Lisa and Nuvia were given information regarding CPR, breathing support, and an example polst with explanation of each care decision discussed. Lisa is leaning toward DNR/DNI with supportive care, but wants to review  "with her borther Loyd when he comes back from [place] to visit on Sunday. Reviewed current POLST and process to have the POLST updated at [facility], as well as how to access HOSPICE at any point for additional support.   Lisa does not want to make changes to his status until after discussion with her brother this weekend. They are open to  visit and appreciative of SWS and CC assistance with discharge plan back to [facility] when pt is stable.  5/8/17 Met with dtr Lisa and wife Nuvia in pt room. They retell pt history with dementia and most recent hospitalizations. They believe pt has quality of life when he as at baseline because of his ability to interact with his 3 year old grandson when Loyd, pt's son, comes with his family from [place] every 4-6 weeks. They are hopeful that pt can recover to his baseline and return to [facility] care. Lisa states that this hospitalization for PNA was \"easier\" than the last one in January. They visit pt at [facility] every day, and dtr Lisa helps him with eating, physical activity. Lisa acknowledged that they are aware of their dad's decline over time. Lisa verbalizes that she sees pts at the [facility] that do not talk, do not have visitors, do not have any interaction with others, they are just taken care of and fed, day after day. She does not want pt to be like that when the time comes, but prior to this admission, her dad was improving in his physical strength. She, her mom and her siblings are talking about code status and care, and she can repeat to me the recommendations that the Hospitalists have told her about code status vs. Supportive or comfort care. \"That is a big change and we would want to call 911 and bring him back to the hospital if his breathing got bad or he was in distress\". Lisa states that she has reviewed information about cpr on the intranet, but was ok with me giving her and her mom information to review about cpr, respiratory support " and decision-making. Lisa and Nuvia agreed to discuss further and review POLST tomorrow. Lisa will bring in a copy of pt's HCD.     Patient has decision-making capacity Unreliable  Patient has a Health Care Agent named in a legal Advance Directive document dated 11/5/1997.  Name: Nuvia Pearson, Relationship: spouse, Phone(s):[phone]  First Alternate: Lisa Pearson, Relationship: daughter, Phone(s): [phone](home), [phone] (cell, primary).  Second Alternate: Sarah Pearson, Relationship: daughter. Phone # not obtained.        Patient has a completed health care directive available in the chart (Y/N)Yes. Physician orders for life-sustaining treatment (POLST) form is on file - yes.   Code Status: Full code      Findings & plan of care discussed with: Bedside RN, JF Manuel and LENORA Guzmán.   Follow-up plan from palliative team: Will continue to follow this pt and family for symptom management and goals of care support.  Thank you for involving us in the patient's care.      Jessica VELA, CNS  Pain Management and Palliative Care  [facility]  Pgr: [phone]    Time Spent on this Encounter   I spent 25 minutes in assessment of the patient and discussion with the patient and family. Another 10 minutes in review of chart, documentation and discussion with the health care team.    Interval History   Pt awake at times during my visit, and then drifts back to sleep. Verbal responses similar to yesterday. Following some basic commands. Breathing easy. No cough, but still on oxygen. Note cxr results and change in antibiotics by Dr. Uribe.  Incontinent of urine.     Review of Systems    C: POSITIVE for change in weight, NEGATIVE for chills or fever.  E/M: NEGATIVE for ear, mouth and throat problems  R: NEGATIVE for significant cough or SOB  CV: NEGATIVE for chest pain, palpitations or peripheral edema    Palliative Symptom Review (0=no symptom/no concern, 1=mild, 2=moderate, 3=severe):       Pain: JANELL - appears comfortable.      Fatigue: JANELL      Nausea: JANELL, eats. No apparent signs of nausea.      Constipation: 0-none      Diarrhea: 0-none      Depressive Symptoms: JANELL      Anxiety: JANELL      Drowsiness: 2-moderate      Poor Appetite: 0-none      Shortness of Breath: 0-none per observation.      Insomnia: 0-none      Other:Agitation 2-moderate      Overall (0 good/no concerns, 3 very poor):  2    Physical Exam   Temp:  [97.3  F (36.3  C)-98  F (36.7  C)] 97.3  F (36.3  C)  Pulse:  [60-68] 68  Resp:  [18-22] 20  BP: (139-162)/(50-66) 144/50  SpO2:  [90 %-96 %] 92 %  190 lbs 1.6 oz  GEN:  Drowsy, oriented x 1, appears comfortable, NAD.  HEENT:  Normocephalic/atraumatic, no scleral icterus, no nasal discharge, mouth moist.  CV:  RRR, S1, S2; no murmurs or other irregularities noted.  +3 DP/PT pulses bilatererally; no edema BLE.  RESP:  Clear to auscultation anteriorly bilaterally without rales/rhonchi/wheezing/retractions.  Symmetric chest rise on inhalation noted.  Normal respiratory effort.  ABD:  Rounded, soft, non-tender/non-distended.  +BS  EXT:  Edema & pulses as noted above.  CMS intact x 4.  Edema in hands +1 bilat.  M/S:   Non-Tender to palpation.    SKIN:  Dry to touch, no exanthems noted in the visualized areas.    NEURO: Symmetric strength +5/5.  Sensation appears to be intact.   There is no area of allodynia or hyperesthesia.  PAIN BEHAVIOR: Cooperative with some commands.  Psych:  Calm, cooperative with some commands, some verbal responses and laughter.    Medications        sodium chloride (PF)  3 mL Intracatheter Q8H     piperacillin-tazobactam  3.375 g Intravenous Q6H     ipratropium - albuterol 0.5 mg/2.5 mg/3 mL  3 mL Nebulization 4x daily     multivitamin, therapeutic with minerals  1 tablet Oral Daily     enoxaparin  40 mg Subcutaneous Q24H     trimethoprim-polymyxin b  2 drop Right Eye 4x Daily     DULoxetine  30 mg Oral Daily     memantine (NAMENDA) tablet 10 mg  10 mg Oral Daily      tamsulosin  0.4 mg Oral QPM     QUEtiapine  25 mg Oral At Bedtime       Data   Results for orders placed or performed during the hospital encounter of 05/05/17 (from the past 24 hour(s))   Potassium   Result Value Ref Range    Potassium 3.4 3.4 - 5.3 mmol/L   Glucose by meter   Result Value Ref Range    Glucose 104 (H) 70 - 99 mg/dL   CBC with platelets   Result Value Ref Range    WBC 8.1 4.0 - 11.0 10e9/L    RBC Count 2.69 (L) 4.4 - 5.9 10e12/L    Hemoglobin 8.3 (L) 13.3 - 17.7 g/dL    Hematocrit 26.5 (L) 40.0 - 53.0 %    MCV 99 78 - 100 fl    MCH 30.9 26.5 - 33.0 pg    MCHC 31.3 (L) 31.5 - 36.5 g/dL    RDW 17.2 (H) 10.0 - 15.0 %    Platelet Count 335 150 - 450 10e9/L   Basic metabolic panel   Result Value Ref Range    Sodium 142 133 - 144 mmol/L    Potassium 3.2 (L) 3.4 - 5.3 mmol/L    Chloride 105 94 - 109 mmol/L    Carbon Dioxide 30 20 - 32 mmol/L    Anion Gap 7 3 - 14 mmol/L    Glucose 95 70 - 99 mg/dL    Urea Nitrogen 10 7 - 30 mg/dL    Creatinine 0.92 0.66 - 1.25 mg/dL    GFR Estimate 79 >60 mL/min/1.7m2    GFR Estimate If Black >90   GFR Calc   >60 mL/min/1.7m2    Calcium 8.5 8.5 - 10.1 mg/dL   Magnesium   Result Value Ref Range    Magnesium 1.9 1.6 - 2.3 mg/dL   NT proBNP inpatient and ED   Result Value Ref Range    N-Terminal Pro BNP Inpatient 2878 (H) 0 - 1800 pg/mL   Procalcitonin   Result Value Ref Range    Procalcitonin  ng/ml     <0.05  <0.05 ng/ml  Normal  Recommendation: Very low risk of bacterial infection.   Discourage antibiotics unless strong clinical suspicion for serious infection.

## 2017-05-11 NOTE — PLAN OF CARE
Problem: Goal Outcome Summary  Goal: Goal Outcome Summary  Outcome: Improving  Day RN  Vss, no temps. Confused at baseline, speech garbled and clear at times. Knows himself and family, can't express needs well. cooperative this shift. Repositioned q2 hours.   Ate and drank with help tolerated >50% of his meals, no coughing related to eating. Occasional nonproductive cough. lungs are dim.   02 stable on 2-3 L can't follow up to do deep breathing.   Incontinent of urine x2 today. Family at bedside and updated on plan of care  Slept majority of the day.

## 2017-05-12 ENCOUNTER — APPOINTMENT (OUTPATIENT)
Dept: PHYSICAL THERAPY | Facility: CLINIC | Age: 82
DRG: 871 | End: 2017-05-12
Payer: MEDICARE

## 2017-05-12 LAB
ANION GAP SERPL CALCULATED.3IONS-SCNC: 6 MMOL/L (ref 3–14)
BUN SERPL-MCNC: 13 MG/DL (ref 7–30)
CALCIUM SERPL-MCNC: 8.6 MG/DL (ref 8.5–10.1)
CHLORIDE SERPL-SCNC: 105 MMOL/L (ref 94–109)
CO2 SERPL-SCNC: 28 MMOL/L (ref 20–32)
CREAT SERPL-MCNC: 0.98 MG/DL (ref 0.66–1.25)
ERYTHROCYTE [DISTWIDTH] IN BLOOD BY AUTOMATED COUNT: 17.5 % (ref 10–15)
GFR SERPL CREATININE-BSD FRML MDRD: 73 ML/MIN/1.7M2
GLUCOSE SERPL-MCNC: 96 MG/DL (ref 70–99)
HCT VFR BLD AUTO: 27 % (ref 40–53)
HGB BLD-MCNC: 8.5 G/DL (ref 13.3–17.7)
MAGNESIUM SERPL-MCNC: 1.8 MG/DL (ref 1.6–2.3)
MCH RBC QN AUTO: 30.9 PG (ref 26.5–33)
MCHC RBC AUTO-ENTMCNC: 31.5 G/DL (ref 31.5–36.5)
MCV RBC AUTO: 98 FL (ref 78–100)
PLATELET # BLD AUTO: 375 10E9/L (ref 150–450)
POTASSIUM SERPL-SCNC: 3.5 MMOL/L (ref 3.4–5.3)
RBC # BLD AUTO: 2.75 10E12/L (ref 4.4–5.9)
SODIUM SERPL-SCNC: 139 MMOL/L (ref 133–144)
WBC # BLD AUTO: 9.4 10E9/L (ref 4–11)

## 2017-05-12 PROCEDURE — 36415 COLL VENOUS BLD VENIPUNCTURE: CPT | Performed by: INTERNAL MEDICINE

## 2017-05-12 PROCEDURE — 25000132 ZZH RX MED GY IP 250 OP 250 PS 637: Mod: GY | Performed by: INTERNAL MEDICINE

## 2017-05-12 PROCEDURE — 99231 SBSQ HOSP IP/OBS SF/LOW 25: CPT | Performed by: CLINICAL NURSE SPECIALIST

## 2017-05-12 PROCEDURE — 83735 ASSAY OF MAGNESIUM: CPT | Performed by: INTERNAL MEDICINE

## 2017-05-12 PROCEDURE — 80048 BASIC METABOLIC PNL TOTAL CA: CPT | Performed by: INTERNAL MEDICINE

## 2017-05-12 PROCEDURE — 12000000 ZZH R&B MED SURG/OB

## 2017-05-12 PROCEDURE — 99232 SBSQ HOSP IP/OBS MODERATE 35: CPT | Performed by: INTERNAL MEDICINE

## 2017-05-12 PROCEDURE — 25000128 H RX IP 250 OP 636: Performed by: INTERNAL MEDICINE

## 2017-05-12 PROCEDURE — 85027 COMPLETE CBC AUTOMATED: CPT | Performed by: INTERNAL MEDICINE

## 2017-05-12 PROCEDURE — 40000193 ZZH STATISTIC PT WARD VISIT

## 2017-05-12 PROCEDURE — A9270 NON-COVERED ITEM OR SERVICE: HCPCS | Mod: GY | Performed by: INTERNAL MEDICINE

## 2017-05-12 PROCEDURE — 97530 THERAPEUTIC ACTIVITIES: CPT | Mod: GP

## 2017-05-12 RX ADMIN — ENOXAPARIN SODIUM 40 MG: 40 INJECTION SUBCUTANEOUS at 15:07

## 2017-05-12 RX ADMIN — QUETIAPINE FUMARATE 25 MG: 25 TABLET, FILM COATED ORAL at 22:03

## 2017-05-12 RX ADMIN — MULTIPLE VITAMINS W/ MINERALS TAB 1 TABLET: TAB at 08:02

## 2017-05-12 RX ADMIN — DULOXETINE HYDROCHLORIDE 30 MG: 30 CAPSULE, DELAYED RELEASE ORAL at 08:02

## 2017-05-12 RX ADMIN — POLYMYXIN B SULFATE AND TRIMETHOPRIM 2 DROP: 1; 10000 SOLUTION OPHTHALMIC at 16:30

## 2017-05-12 RX ADMIN — TAMSULOSIN HYDROCHLORIDE 0.4 MG: 0.4 CAPSULE ORAL at 22:02

## 2017-05-12 RX ADMIN — POLYMYXIN B SULFATE AND TRIMETHOPRIM 2 DROP: 1; 10000 SOLUTION OPHTHALMIC at 12:24

## 2017-05-12 RX ADMIN — MEMANTINE 10 MG: 5 TABLET ORAL at 08:01

## 2017-05-12 RX ADMIN — POLYMYXIN B SULFATE AND TRIMETHOPRIM 2 DROP: 1; 10000 SOLUTION OPHTHALMIC at 22:06

## 2017-05-12 RX ADMIN — POLYMYXIN B SULFATE AND TRIMETHOPRIM 2 DROP: 1; 10000 SOLUTION OPHTHALMIC at 08:05

## 2017-05-12 NOTE — PLAN OF CARE
Problem: Goal Outcome Summary  Goal: Goal Outcome Summary  Outcome: Improving  Day RN  Vss, no temps. Confused at baseline. Speech is garbles sometimes clear, can't express needs well. Fidgets with hands grabs and resists you when turning or repositioning. Otherwise calm and cooperative with cares. Up to chair with lift today a couple hours. repositioned q1 hours. Incontinent of moderate amount of urine, no bm today.   Declined to eat in am and ate only a little in afternoon.   02 stable on RA most of shift. Doesn't follow to do deep breathing.   Slept most of shift, wakes to [facility]cu

## 2017-05-12 NOTE — DISCHARGE INSTRUCTIONS
RESUMPTION OF HOME CARE SERVICES PT/OT and SLP     Speech Therapy has been following you while you have been in the hospital they have Recommended a continue dysphagia diet 2 and nectar thick liquids with 1:1 supervision. Pt should be fully upright and alert for all PO, take small single sips/bties, alternate consistencies, and pace self. Hold PO should pt demonstrate overt s/sx of aspiration or if pt is not alert enough to safely take PO.   Speech therapy to follow pt at [facility] center

## 2017-05-12 NOTE — PLAN OF CARE
Problem: Goal Outcome Summary  Goal: Goal Outcome Summary  Outcome: Improving  Confused, cooperative at times, easily agitated with repositioning and incont. Cares often kicking and swinging arms at staff.  Denies any pain.  LS diminished bilaterally, weaned off O2 sats 89-94% RA.  Incont. Both urine/stool, skin intact.  Up with lift, reposition with A2-4.  Slept between cares.

## 2017-05-12 NOTE — PROGRESS NOTES
Care Transitions Team: Following for CC, discharge planning, and disposition.      Per chart review pt appears to be improving. Spoke with daughter, Lisa who is requesting a call be placed to Heydi Haque at [facility] at which pt resides.  Specifically to discuss SLP recomendations and request follow through at [facility] center as well as discuss changes to Seroquel dosing as this may need re addressing upon care team that pt is followed at the the [facility]. These recommendations have been added to DC paper work     Placed call at [facility], [phone], left VM message, requesting return call in order to update on this concerns.   Return call from Heydi HAQUE, discussed  Seroquel changes and Diet recommendations.  Requested Copy of diet rec's from Nutrition services at be sent home with pt for [facility] and then reccommended on going SLP Home care services.     Pt. is followed by [facility] through the [facility], and RN CC to provide handoff to Sarah, Care Coordinator for [facility] regarding same concerns for transitions of care.     MD: Request for RESUMPTION OF HOME CARE PT/OT/SLP on discharge. Thank you      Daughters Lisa has requested HE transport on discharge.     WKND SWS/CM : Heydi is the RN at [facility] and explains that she will anticipate return tomorrow and SWS/CM only need to call if there are questions, at .  Heydi is requesting a discharge packet to be sent with pt on return --SAME AS YOU WOULD DO FOR TCU --       Mar Salazar RN BSN CTS  Care Transitions Team  [phone]

## 2017-05-12 NOTE — PLAN OF CARE
Problem: Goal Outcome Summary  Goal: Goal Outcome Summary  Outcome: No Change  Alert, disoriented x4. Cooperative at times, easily agitated with repositioning and changing-swings and grabs staff. VSS-2LNC-soft mits on due to patient consistently taking nasal canula off. LS diminished. Up with lift.

## 2017-05-13 VITALS
WEIGHT: 190.1 LBS | RESPIRATION RATE: 18 BRPM | DIASTOLIC BLOOD PRESSURE: 56 MMHG | HEIGHT: 72 IN | SYSTOLIC BLOOD PRESSURE: 139 MMHG | BODY MASS INDEX: 25.75 KG/M2 | HEART RATE: 65 BPM | OXYGEN SATURATION: 90 % | TEMPERATURE: 96.3 F

## 2017-05-13 PROBLEM — F02.818 LATE ONSET ALZHEIMER'S DISEASE WITH BEHAVIORAL DISTURBANCE (H): Status: ACTIVE | Noted: 2017-05-13

## 2017-05-13 PROBLEM — G30.1 LATE ONSET ALZHEIMER'S DISEASE WITH BEHAVIORAL DISTURBANCE (H): Status: ACTIVE | Noted: 2017-05-13

## 2017-05-13 PROBLEM — J96.01 ACUTE RESPIRATORY FAILURE WITH HYPOXIA (H): Status: ACTIVE | Noted: 2017-05-13

## 2017-05-13 PROBLEM — B99.9 INFECTIOUS ENCEPHALOPATHY: Status: ACTIVE | Noted: 2017-05-13

## 2017-05-13 PROBLEM — G93.49 INFECTIOUS ENCEPHALOPATHY: Status: ACTIVE | Noted: 2017-05-13

## 2017-05-13 PROBLEM — H10.31 ACUTE BACTERIAL CONJUNCTIVITIS OF RIGHT EYE: Status: ACTIVE | Noted: 2017-05-13

## 2017-05-13 PROCEDURE — 99238 HOSP IP/OBS DSCHRG MGMT 30/<: CPT | Performed by: INTERNAL MEDICINE

## 2017-05-13 PROCEDURE — 25000132 ZZH RX MED GY IP 250 OP 250 PS 637: Mod: GY | Performed by: INTERNAL MEDICINE

## 2017-05-13 PROCEDURE — A9270 NON-COVERED ITEM OR SERVICE: HCPCS | Mod: GY | Performed by: INTERNAL MEDICINE

## 2017-05-13 PROCEDURE — 25000128 H RX IP 250 OP 636: Performed by: INTERNAL MEDICINE

## 2017-05-13 RX ADMIN — ENOXAPARIN SODIUM 40 MG: 40 INJECTION SUBCUTANEOUS at 14:52

## 2017-05-13 RX ADMIN — POLYMYXIN B SULFATE AND TRIMETHOPRIM 2 DROP: 1; 10000 SOLUTION OPHTHALMIC at 12:12

## 2017-05-13 RX ADMIN — MEMANTINE 10 MG: 5 TABLET ORAL at 12:10

## 2017-05-13 RX ADMIN — DULOXETINE HYDROCHLORIDE 30 MG: 30 CAPSULE, DELAYED RELEASE ORAL at 12:10

## 2017-05-13 RX ADMIN — MULTIPLE VITAMINS W/ MINERALS TAB 1 TABLET: TAB at 12:10

## 2017-05-13 NOTE — DISCHARGE SUMMARY
[facility] Discharge Summary    Eder Pearson MRN# [id number]   Age: 83 year old YOB: 1933     Date of Admission:  5/5/2017  Date of Discharge::  5/13/2017  Admitting Physician:  Cortes Anna MD  Discharge Physician:  Pacheco Prince MD     Home clinic: [facility]          Admission Diagnoses:   Delirium [R41.0]  PNA (pneumonia) [J18.9]  Acute bacterial conjunctivitis of right eye [H10.31]          Discharge Diagnosis:   Principal Problem:    Aspiration pneumonitis (H)  Active Problems:    Acute respiratory failure with hypoxia (H)    Late onset Alzheimer's disease with behavioral disturbance    Acute bacterial conjunctivitis of right eye    Infectious encephalopathy            Procedures:   CXR          Medications Prior to Admission:     Prescriptions Prior to Admission   Medication Sig Dispense Refill Last Dose     acetaminophen (TYLENOL) 500 MG tablet Take 1,000 mg by mouth 2 times daily , at 0700 & 1700   5/5/2017 at 0700     Cyanocobalamin (VITAMIN B 12 PO) Take 1,000 mcg by mouth daily    5/5/2017 at 0800     Cholecalciferol (VITAMIN D3 PO) Take 2,000 Units by mouth daily    5/5/2017 at 0900     Memantine HCl (NAMENDA PO) Take 10 mg by mouth daily   5/5/2017 at 0900     tamsulosin (FLOMAX) 0.4 MG capsule Take 0.4 mg by mouth At Bedtime   5/4/2017 at 2000     DULoxetine (CYMBALTA) 30 MG EC capsule Take 30 mg by mouth daily   5/4/2017 at 2000     Psyllium (NATURAL FIBER PO) Take 1 tsp. by mouth daily   5/5/2017 at 0900     QUEtiapine (SEROQUEL) 25 MG tablet Take 25 mg by mouth At Bedtime   5/4/2017 at 2000     sodium chloride (OCEAN) 0.65 % nasal spray Spray 2 sprays into both nostrils 2 times daily , at 0900 & 2100   5/5/2017 at 0900     miconazole with skin protectant (LOPEZ ANTIFUNGAL) 2 % CREA cream Apply topically every 8 hours Apply to buttocks topically every shift for barrier cream with each toileting   am     QUEtiapine (SEROQUEL) 25 MG tablet Take 12.5  mg by mouth every 12 hours as needed   prn     sodium chloride (OCEAN) 0.65 % nasal spray Spray 2 sprays into both nostrils every 8 hours as needed for congestion   prn     senna-docusate (SENOKOT-S;PERICOLACE) 8.6-50 MG per tablet Take 1 tablet by mouth every 12 hours as needed for constipation   prn     acetaminophen (TYLENOL) 500 MG tablet Take 1,000 mg by mouth daily as needed for mild pain   prn     phenylephrine-shark liver oil-mineral oil-petrolatum (PREPARATION H) 0.25-14-74.9 % rectal ointment Place rectally daily as needed for hemorrhoids   prn     menthol-zinc oxide (CALMOSEPTINE) 0.44-20.625 % OINT ointment Apply topically every 8 hours as needed for skin protection   prn     [DISCONTINUED] QUEtiapine (SEROQUEL) 25 MG tablet Take 12.5 mg by mouth daily (with dinner)   5/4/2017 at 1800     [DISCONTINUED] QUEtiapine (SEROQUEL) 25 MG tablet Take 25 mg by mouth every morning   5/5/2017 at 0800     bisacodyl (DULCOLAX) 10 MG Suppository Place 10 mg rectally daily as needed for constipation   prn     [DISCONTINUED] QUETIAPINE FUMARATE PO Take 12.5 mg by mouth daily (with lunch)    5/4/2017 at 1300             Discharge Medications:     Current Discharge Medication List      CONTINUE these medications which have NOT CHANGED    Details   !! acetaminophen (TYLENOL) 500 MG tablet Take 1,000 mg by mouth 2 times daily , at 0700 & 1700      Cyanocobalamin (VITAMIN B 12 PO) Take 1,000 mcg by mouth daily       Cholecalciferol (VITAMIN D3 PO) Take 2,000 Units by mouth daily       Memantine HCl (NAMENDA PO) Take 10 mg by mouth daily      tamsulosin (FLOMAX) 0.4 MG capsule Take 0.4 mg by mouth At Bedtime      DULoxetine (CYMBALTA) 30 MG EC capsule Take 30 mg by mouth daily      Psyllium (NATURAL FIBER PO) Take 1 tsp. by mouth daily      !! QUEtiapine (SEROQUEL) 25 MG tablet Take 25 mg by mouth At Bedtime      !! sodium chloride (OCEAN) 0.65 % nasal spray Spray 2 sprays into both nostrils 2 times daily , at 0900 & 2100       miconazole with skin protectant (LOPEZ ANTIFUNGAL) 2 % CREA cream Apply topically every 8 hours Apply to buttocks topically every shift for barrier cream with each toileting      !! QUEtiapine (SEROQUEL) 25 MG tablet Take 12.5 mg by mouth every 12 hours as needed      !! sodium chloride (OCEAN) 0.65 % nasal spray Spray 2 sprays into both nostrils every 8 hours as needed for congestion      senna-docusate (SENOKOT-S;PERICOLACE) 8.6-50 MG per tablet Take 1 tablet by mouth every 12 hours as needed for constipation      !! acetaminophen (TYLENOL) 500 MG tablet Take 1,000 mg by mouth daily as needed for mild pain      phenylephrine-shark liver oil-mineral oil-petrolatum (PREPARATION H) 0.25-14-74.9 % rectal ointment Place rectally daily as needed for hemorrhoids      menthol-zinc oxide (CALMOSEPTINE) 0.44-20.625 % OINT ointment Apply topically every 8 hours as needed for skin protection      bisacodyl (DULCOLAX) 10 MG Suppository Place 10 mg rectally daily as needed for constipation       !! - Potential duplicate medications found. Please discuss with provider.                Consultations:   No consultations were requested during this admission          Hospital Course:   83 year old male with PMH most significant for advanced dementia who presented from his memory care facility on 5/5/17 with shortness of breath and increased lethargy. He was found to have fever, leukocytosis and subtle RML infiltrate that was suspected to have been due to aspiration. He was admitted for treatment of aspiration pneumonia and incidentally right eye conjunctivitis.      During course of hospital stay, patient's lethargy improved, and was to discontinue supplemental oxygen requirements. Had episode of fever on 5/9, but none before or since. He clearly coughs with thin liquids and daughter indicates that she has been encouraging him to take in more fluids despite this. SLP confirms the presence of aspiration. SLP did see the patient to  "guide treatment.     Overall, though the patient appeared to have much less cough and supplemental O2 needs as of 5/11. His VS, exam, WBC and procalcitonin were strongly consistent with the absence of significant infection. For that reason, the ABX were stopped in favor of monitoring the patient off meds.     In the past 48 hours, since discontinuation of the antibiotics, Mr. Pearson has remained stable from the point of view of oxygen need and fevers. He is interacting with family at about his baseline. His Seroquel dosing has been significantly reduced and this is reflected in the changes noted in Discharge Medications.     At the time of discharge, Mr. Pearson is somnolent but able to waken for family. He is confused at baseline.   /56  Pulse 65  Temp 96.3  F (35.7  C) (Axillary)  Resp 18  Ht 1.829 m (6' 0.01\")  Wt 86.2 kg (190 lb 1.6 oz)  SpO2 90%  BMI 25.78 kg/m2  Chest: CTA with symmetric   COR: RRR without murmur  Abd: soft, not apparently tender.   Extrem: soft, pitting edema bilat feet (PCDs currently in place)         Discharge Instructions and Follow-Up:   Discharge diet: Soft mechanical with nectar-thickened liquids.   Discharge activity: Resume usual activity which recently has been pivot with assist   Discharge follow-up: Follow up with the NH physician per routine.           Discharge Disposition:   Discharged to long-term care facility      Attestation:  I have reviewed today's vital signs, notes, medications, labs and imaging.  Total time: 25 minutes    Pacheco Prince MD     "

## 2017-05-13 NOTE — CONSULTS
..Care Transition Initial Assessment - [facility]  Reason For Consult: care coordination/care conference, discharge planning  Met with Nuvia Gonzalez and pt to discuss discharge plan. Pt is ready to dc back to [facility]. Family would like to set up medical transportation and is aware of the cost.     [facility] called [place] and informed them that pt is dc today at 4:30pm. REGIS set up transportation wheelchair transport via [employer] at 4:30pm.      Principal Problem:    Aspiration pneumonitis (H)  Active Problems:    Acute respiratory failure with hypoxia (H)    Late onset Alzheimer's disease with behavioral disturbance    Acute bacterial conjunctivitis of right eye    Infectious encephalopathy         DATA  Lives With: facility resident  Living Arrangements: residential facility ([facility] )  Identified issues/concerns regarding health management: N/A           PLAN  Financial costs for the patient includes: None  Patient given options and choices for discharge: Yes  Patient/family is agreeable to the plan?  YES  Patient Goals and Preferences: Dc back to [facility]  Patient anticipates discharging to: Dc back to [facility]

## 2017-05-13 NOTE — PLAN OF CARE
Problem: Pneumonia (Adult)  Goal: Signs and Symptoms of Listed Potential Problems Will be Absent or Manageable (Pneumonia)  Signs and symptoms of listed potential problems will be absent or manageable by discharge/transition of care (reference Pneumonia (Adult) CPG).   Outcome: Improving  Disoriented x4 at baseline. Lethargic but awakes to touch and voice. VSS. LS diminished but CTA. jamilah PO well. up with lift and A2. Repositioned with A2.  Does not appear to be or verbalize pain. voiding in good amts, incontinent and was changed x2 this shift. One incontinent, medium, puddy like stool this shift. plans to dc back to memory care today.

## 2017-05-13 NOTE — PLAN OF CARE
Problem: Goal Outcome Summary  Goal: Goal Outcome Summary  Occupational Therapy Discharge Summary     Reason for therapy discharge:    Discharged to memory care unit     Progress towards therapy goal(s). See goals on Care Plan in [facility] electronic health record for goal details.  Goals not met.  Barriers to achieving goals:   limited tolerance for therapy and limited participation.     Therapy recommendation(s):    Assist with ADLs at [facility]

## 2017-05-13 NOTE — PROGRESS NOTES
[facility]  Palliative Care Progress Note  Text Page     Assessment & Plan   I was asked to see the patient and family for goals of care.     Recommendations:  1. Delirium - Seroquel as per Hospitalist team. Appreciate nursing help to achieve routine with cares and activities, use sensory aides, promote sleep at night and awake during the day. Cymbalta and Namenda restarted 5/6 by Hospitalists.   2. Dyspnea - Promote good pulmonary toilet, position for comfort, breathing, oxygen as previously ordered, offer fan prn. Position for optimal safety and alertness with eating. Will hold on opioid for dyspnea prn for now. Nebs as per Hospitalists.     Goal of Care: Full code with restorative goal. Received copy of HCD 5/9/2017 - copy on chart and will have copy sent to [facility] to be scanned. Reviewed POLST from [facility] 2/24/15. Dtr Lisa and wife Nuvia have information on CPR, breathing help, and POLST options for care. Lisa wishes to discuss with brother Loyd when he is in town. They will follow up with provider at [facility] if pt is discharged before this time.      Disease Process/es & Symptoms:  Eder Pearson is a 83 year old patient admitted with symptoms of shortness of breath with fever and leukocytosis. He has been treated for aspiration PNA and confusion.      This is in the setting of anxiety, dementia, prostate cancer and chronic kidney disease. He has been hospitalized Jan, 2017 for pneumonia and UTI, and now with aspiration PNA. There is a documented unitentional weight loss of 25 pounds over the past 12 months.     The following symptoms are noted by family as concerning to pt's quality of life.  Dyspnea - improving with treatment for PNA.  Drowsiness - attributed to seroquel, pneumonia illness, changes to daily routines.  Deconditioning - dtr Lisa reports that pt was limited in his ability to rehab at one point in his stay at TCU, but in the last month had been  walking and improving some of his physical strength with PT.     Psychosocial/Spiritual Needs:     Oriented to Spiritual Health and Social Work Services as part of Palliative Care team. SWS is following pt. Referral for Spiritual Health .   Pt is Hindu.     Decision-Making & Goals of Care:  Discussion/counseling today about goals of care/decisions:   17 Visited with Nuvia Gonzalez and pt at supper time. Lisa was feeding pt. Between bites, pt was chewing on corner of a towel. Pt is alert, Verbal at times. Cooperative at present. On RA and IV SL. Eating some. Lisa reviews POC, goals remain unchanged except that brother is not coming to [place] this weekend. Lisa states she has been talking with her siblings about long term decisions based on how her dad does after discharge. Lisa has been working with the SWS and CC and staff of [facility] to ensure that pt will have support of ST, and understand diet restrictions when he transfers on . Lisa reviewed her understanding of how to access hospice if it becomes apparent. I was able to reinforce for process after discharge if needed. She also has information still about Cpr, breathing support, polst and care options. Appreciate , SWS and cc support for pt and family.  17 Visited with Michel today. Pt's mentation and LOC is unchanged from yesterday. Much time spent discussing his medication management. Reinforced with them that it will be a balancing act to control his agitation, yet keep him awake when needed to participate in his therapies and Eat safely. Encouraged Lisa to check in with the psychiatrist that monitors his medications at [facility] as pt may need adjustments in his medications after discharge, just as the hospitalists are doing for him here, in order to find the balance he will need. Lisa is comparing her dad's medical situation with her brother's who  approximately 25 years ago of HIV related complications. Lisa  "and Nuvia continue to hope that pt can recover and get stronger. I encouraged them in addition to this hope, to plan and think about what will, should happen if he does not improve. Lisa feels that she has the information she needs to have a discussion with her brother this weekend when he arrives and has texted him that she wants to talk about how aggressive to be with his cares. Lisa verbalized that she wants to, and to talk about what happens the next time he has an aspiration pneumonia.  \"I knew in January that we would eventually be bringing him back with a pneumonia, but I didn't think it would be this soon\". Lisa wants to discuss pt's medications with Dr. Prince today, and criteria and timing for possible discharge. Appreciate check in by CC or SWS today with Lisa and Nuvia regarding discharge plan to memory care. They have been following pt. and are aware of family's goals for his care.  5/10/17 No family present this am. Pt alert this am, similar to last evening. Laughed, some short verbal responses, following a few simple commands, and then drifts off to sleep. Noted period of agitation last night. POC continues to be to return to memory care with PT at discharge. Dtr Lisa and wife Nuvia would like to talk with their brother Loyd when he comes back to [place] this weekend regarding pt goals of care. HCD on chart. No change to code status or POLST at present. Lisa and Nuvia are informed of how to request a change to the POLST after discharge with the [facility] group after discharge. They are also informed how to access HOSPICE after discharge. Have been given information on CPR and Respiratory support for their reference. Appreciate SS and CC support with discharge planning. Updated Dr. Uribe.  5/9/17 Met with dtr Lisa and wife Nuvia in pt room. Pt had been more sedated this am, but after being put into a chair by pt, pt has periods where he opens his eyes, follows some commands, and then drifts back " "to sleep. Wife Nuvia informs me several times during conversation that Lisa is the family spokesperson regarding pt.  Dtr's concerns were addressed via phone call to Dr. Uribe regarding adjusting pt's seroquel, ensuring that PT is ordered for pt at discharge to memory care. Lisa and wife acknowledge that they are emotional and retell stories of the pt. Lisa reiterates that the family currently believe that pt has quality of life, and potential to improve physically. Reviewed their understanding of pt condition, reviewed possible options to support their wish for pt, including DNR/DNI, and/or supportive care, and/or comfort care, and artificial nutrition. Lisa and Nuvia were given information regarding CPR, breathing support, and an example polst with explanation of each care decision discussed. Lisa is leaning toward DNR/DNI with supportive care, but wants to review with her borther Loyd when he comes back from [place] to visit on Sunday. Reviewed current POLST and process to have the POLST updated at [facility], as well as how to access HOSPICE at any point for additional support.   Lisa does not want to make changes to his status until after discussion with her brother this weekend. They are open to  visit and appreciative of SWS and CC assistance with discharge plan back to [facility] care when pt is stable.  5/8/17 Met with dtr Lisa and wife Nuvia in pt room. They retell pt history with dementia and most recent hospitalizations. They believe pt has quality of life when he as at baseline because of his ability to interact with his 3 year old grandson when Loyd, pt's son, comes with his family from [place] every 4-6 weeks. They are hopeful that pt can recover to his baseline and return to memory care. Lisa states that this hospitalization for PNA was \"easier\" than the last one in January. They visit pt at memory care every day, and dtr Lisa helps him with eating, physical activity. Lisa " "acknowledged that they are aware of their dad's decline over time. Lisa verbalizes that she sees pts at the [facility] that do not talk, do not have visitors, do not have any interaction with others, they are just taken care of and fed, day after day. She does not want pt to be like that when the time comes, but prior to this admission, her dad was improving in his physical strength. She, her mom and her siblings are talking about code status and care, and she can repeat to me the recommendations that the Hospitalists have told her about code status vs. Supportive or comfort care. \"That is a big change and we would want to call 911 and bring him back to the hospital if his breathing got bad or he was in distress\". Lisa states that she has reviewed information about cpr on the intranet, but was ok with me giving her and her mom information to review about cpr, respiratory support and decision-making. Lisa and Nuvia agreed to discuss further and review POLST tomorrow. Lisa will bring in a copy of pt's HCD.     Patient has decision-making capacity Unreliable  Patient has a Health Care Agent named in a legal Advance Directive document dated 11/5/1997.  Name: Nuvia Pearson, Relationship: spouse, Phone(s):[phone]  First Alternate: Lisa Pearson, Relationship: daughter, Phone(s): [phone](home), [phone] (cell, primary).  Second Alternate: Sarah Pearson, Relationship: daughter. Phone # not obtained.        Patient has a completed health care directive available in the chart (Y/N)Yes. Physician orders for life-sustaining treatment (POLST) form is on file - yes.   Code Status: Full code      Findings & plan of care discussed with: Bedside RN, CC Mar and LENORA Guzmán.   Follow-up plan from palliative team: Will continue to follow this pt and family for symptom management and goals of care support.  Thank you for involving us in the patient's care.      Jessica VELA, CNS  Pain Management and Palliative " Care  [facility]  Pgr: [phone]    Time Spent on this Encounter   I spent 15 minutes in assessment of the patient and discussion with the patient and family. Another 5 minutes in review of chart, documentation and discussion with the health care team.    Interval History   Pt awake and up in gerichair. Dtr Lisa is feeding him. Breathing easy. Smiling at times. Eats food based mostly on cues, follows occasional simple commands.  On RA.  Incontinent of urine.     Review of Systems    C: POSITIVE for change in weight, NEGATIVE for chills or fever.  E/M: NEGATIVE for ear, mouth and throat problems  R: NEGATIVE for significant cough or SOB  CV: NEGATIVE for chest pain, palpitations or peripheral edema    Palliative Symptom Review (0=no symptom/no concern, 1=mild, 2=moderate, 3=severe):      Pain: JANELL - appears comfortable.      Fatigue: JANELL      Nausea: JANELL, eats. No apparent signs of nausea.      Constipation: 0-none      Diarrhea: 0-none      Depressive Symptoms: JANELL      Anxiety: JANELL      Drowsiness: 1-mild      Poor Appetite: 0-none      Shortness of Breath: 0-none per observation.      Insomnia: 0-none      Other:Agitation 2-moderate      Overall (0 good/no concerns, 3 very poor):  2    Physical Exam   Temp:  [96.6  F (35.9  C)-98  F (36.7  C)] 96.6  F (35.9  C)  Pulse:  [64-67] 67  Resp:  [16-18] 18  BP: (149-162)/(59-78) 149/78  SpO2:  [86 %-94 %] 94 %  190 lbs 1.6 oz  GEN:  Alert, oriented x 1, appears comfortable, NAD.  HEENT:  Normocephalic/atraumatic, no scleral icterus, no nasal discharge, mouth moist.  CV:Deferred auscultation. 3 DP/PT pulses bilatererally; no edema BLE.  RESP:  Deferred auscultation.  Symmetric chest rise on inhalation noted.  Normal respiratory effort.  ABD:  Rounded, soft, non-tender/non-distended.  +BS  EXT:  Edema & pulses as noted above.  CMS intact x 4.    M/S:   Non-Tender to palpation.    SKIN:  Dry to touch, no exanthems noted in the visualized areas.    NEURO:  Symmetric strength +5/5.  Sensation appears to be intact.   There is no area of allodynia or hyperesthesia.  PAIN BEHAVIOR: Cooperative with some commands.  Psych:  Calm, cooperative with some commands, some verbal responses and laughter.    Medications        sodium chloride (PF)  3 mL Intracatheter Q8H     multivitamin, therapeutic with minerals  1 tablet Oral Daily     enoxaparin  40 mg Subcutaneous Q24H     trimethoprim-polymyxin b  2 drop Right Eye 4x Daily     DULoxetine  30 mg Oral Daily     memantine (NAMENDA) tablet 10 mg  10 mg Oral Daily     tamsulosin  0.4 mg Oral QPM     QUEtiapine  25 mg Oral At Bedtime       Data   Results for orders placed or performed during the hospital encounter of 05/05/17 (from the past 24 hour(s))   Magnesium   Result Value Ref Range    Magnesium 1.8 1.6 - 2.3 mg/dL   Basic metabolic panel   Result Value Ref Range    Sodium 139 133 - 144 mmol/L    Potassium 3.5 3.4 - 5.3 mmol/L    Chloride 105 94 - 109 mmol/L    Carbon Dioxide 28 20 - 32 mmol/L    Anion Gap 6 3 - 14 mmol/L    Glucose 96 70 - 99 mg/dL    Urea Nitrogen 13 7 - 30 mg/dL    Creatinine 0.98 0.66 - 1.25 mg/dL    GFR Estimate 73 >60 mL/min/1.7m2    GFR Estimate If Black 89 >60 mL/min/1.7m2    Calcium 8.6 8.5 - 10.1 mg/dL   CBC with platelets   Result Value Ref Range    WBC 9.4 4.0 - 11.0 10e9/L    RBC Count 2.75 (L) 4.4 - 5.9 10e12/L    Hemoglobin 8.5 (L) 13.3 - 17.7 g/dL    Hematocrit 27.0 (L) 40.0 - 53.0 %    MCV 98 78 - 100 fl    MCH 30.9 26.5 - 33.0 pg    MCHC 31.5 31.5 - 36.5 g/dL    RDW 17.5 (H) 10.0 - 15.0 %    Platelet Count 375 150 - 450 10e9/L

## 2017-05-13 NOTE — PLAN OF CARE
Problem: Goal Outcome Summary  Goal: Goal Outcome Summary  Speech Language Therapy Discharge Summary     Reason for therapy discharge:    Discharged to [facility] in [place].     Progress towards therapy goal(s). See goals on Care Plan in [facility] electronic health record for goal details.  Goals partially met.  Barriers to achieving goals:   discharge from facility.     Therapy recommendation(s):    Continued therapy is recommended.  Rationale/Recommendations:  Recommend continue dysphagia diet 2 and nectar thick liquids with 1:1 supervision. Pt should be fully upright and alert for all PO, take small single sips/bties, alternate consistencies, and pace self. Hold PO should pt demonstrate overt s/sx of aspiration or if pt is not alert enough to safely take PO..

## 2017-05-13 NOTE — PROGRESS NOTES
[facility]    Hospitalist Progress Note    Date of Service (when I saw the patient): 05/12/2017    Assessment & Plan     83 year old male with PMH most significant for advanced dementia who presented from his memory care facility on 5/5/27 with shortness of breath found to have fever, leukocytosis and subtle RML infiltrate in the setting of a suspected recent aspiration episode. He is being admitted for treatment of aspiration pneumonia and also has a right eye conjunctivitis.     During course of hospital stay, patient's lethargy has improved, and has been able to reduce his supplemental oxygen requirements. Had episode of fever on 5/9, but none since. He clearly seems to cough with thin liquids and daughter indicates that she has been encouraging him to take in more fluids despite this. SLP confirms the presence of aspiration.    Overall, though the patient appeared to have much less cough and supplemental O2 needs as of 5/11. His VS, exam, WBC and procalcitonin were strongly consistent with the absence of significant infection. For that reason, the ABX were stopped in favor of monitoring the patient off meds.      Assessment and Plan:     # Sepsis due to RML infiltrate reflective of apparent aspiration.  Presented with productive cough, dyspnea and subtle RML infiltrate with a mild leukocytosis. Report of aspiration episode while eating a couple of days prior to admission.   -- Initially was on IV Unasyn and then changed to Zosyn. GIven the absence of fever (except for a single spike on 5/9/17) and with procalcitonin < 0.05, will stop abx and monitor.  -  Blood cultures - NGTD  -- supplemental O2 as needed, wean off as tolerated   -- SLP evaluation , on modified diet   -- Aspiration precautions, ensure patient is upright for meals  -- Concern for sedation with medications used for his dementia, please see discussion below, wonder about another episode of aspiration causing fever although clinically  looks better  -- Check BNP to screen for CHF, one time IV lasix     # Unilateral conjunctivitis: right eye. this was felt to be somewhat purulent and may be bacterial. In addition to systemic antibiotics, will continue polytrim eye drops.   -- much improved      #  Dementia with behavioral issues: lives in memory care unit.  Has some behavioral issues at baseline.  Will take meds crushed in pudding generally.  - daughter has advocated for less sedating medications during this hospital stay.      -- Suspect he had component of acute encephalopathy from infection on top of dementia, on admission - that appears to have resolved.   -- at daughter's request, pt had decreased dose of Seroquel and appeared much more alert and interactive. OK to leave other doses available PRN, but will not schedule except for HS.  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 Although again for last one month has been using only the lift. His daughter would like a trial of therapies , PT/OT consulted     Goals of care: Guarded prognosis although family hoping for restorative cares, palliative care consulted, appreciate the consult, dtr hoping that his diet can be advanced as ice cream is one of the few things that he enjoys. Daughter is going to speak more with her bother before making further decisions about code status     DVT Prophylaxis: Enoxaparin (Lovenox) SQ    Code Status: Full Code    Disposition: Expected discharge - pending stability tomorrow.     Pacheco Prince    Interval History   More interactive with family at the time of my later visit today. Pt had not responded to me at all  initially.   Eating some, but not sure if he is taking much in the way of thickened liquids.    -Data reviewed today: I reviewed all new labs and imaging results over the last 24 hours. I personally reviewed no images or EKG's today.    Physical Exam   Temp: 98  F (36.7  C) Temp src: Axillary BP: 150/59 Pulse: 64   Resp: 16 SpO2: 91 % O2 Device: None (Room air) Oxygen Delivery: 2 LPM  Vitals:    05/05/17 1717   Weight: 86.2 kg (190 lb 1.6 oz)     Vital Signs with Ranges  Temp:  [97.1  F (36.2  C)-98  F (36.7  C)] 98  F (36.7  C)  Pulse:  [62-65] 64  Resp:  [16-18] 16  BP: (150-162)/(59-72) 150/59  SpO2:  [86 %-94 %] 91 %  I/O last 3 completed shifts:  In: 360 [P.O.:360]  Out: -       Constitutional: Speech is not coherent to me. Comfortable off supplemental O2.   Respiratory: No crackles or wheezing noted. No increased WOB or asymmetric chest rise.   Cardiovascular: Regular rate and rhythm, normal S1 and S2, no loud murmur, rubs or gallops noted   Abdomen: Bowel sounds present, soft, non-distended, non-tender   Skin: No exanthems noted on exposed areas, no cyanosis, dry to touch   Neuro: Awake, confused, moving all extremities , able to converse, mumbles   Extremities: No pitting edema,  skin is warm to touch with signs of adequate peripheral perfusion    Psychiatric: Confused              Medications        sodium chloride (PF)  3 mL Intracatheter Q8H     multivitamin, therapeutic with minerals  1 tablet Oral Daily     enoxaparin  40 mg Subcutaneous Q24H     trimethoprim-polymyxin b  2 drop Right Eye 4x Daily     DULoxetine  30 mg Oral Daily     memantine (NAMENDA) tablet 10 mg  10 mg Oral Daily     tamsulosin  0.4 mg Oral QPM     QUEtiapine  25 mg Oral At Bedtime       Data    Lab/Imaging:  Results for orders placed or performed during the hospital encounter of 05/05/17 (from the past 24 hour(s))   Potassium   Result Value Ref Range    Potassium 3.6 3.4 - 5.3 mmol/L   Magnesium   Result Value Ref Range     Magnesium 1.8 1.6 - 2.3 mg/dL   Basic metabolic panel   Result Value Ref Range    Sodium 139 133 - 144 mmol/L    Potassium 3.5 3.4 - 5.3 mmol/L    Chloride 105 94 - 109 mmol/L    Carbon Dioxide 28 20 - 32 mmol/L    Anion Gap 6 3 - 14 mmol/L    Glucose 96 70 - 99 mg/dL    Urea Nitrogen 13 7 - 30 mg/dL    Creatinine 0.98 0.66 - 1.25 mg/dL    GFR Estimate 73 >60 mL/min/1.7m2    GFR Estimate If Black 89 >60 mL/min/1.7m2    Calcium 8.6 8.5 - 10.1 mg/dL   CBC with platelets   Result Value Ref Range    WBC 9.4 4.0 - 11.0 10e9/L    RBC Count 2.75 (L) 4.4 - 5.9 10e12/L    Hemoglobin 8.5 (L) 13.3 - 17.7 g/dL    Hematocrit 27.0 (L) 40.0 - 53.0 %    MCV 98 78 - 100 fl    MCH 30.9 26.5 - 33.0 pg    MCHC 31.5 31.5 - 36.5 g/dL    RDW 17.5 (H) 10.0 - 15.0 %    Platelet Count 375 150 - 450 10e9/L

## 2017-05-13 NOTE — PLAN OF CARE
Problem: Goal Outcome Summary  Goal: Goal Outcome Summary  Physical Therapy Discharge Summary     Reason for therapy discharge:    Discharged to Memory care unit     Progress towards therapy goal(s). See goals on Care Plan in Epic electronic health record for goal details.  Goals not met.  Barriers to achieving goals:   discharge from facility.     Therapy recommendation(s):    Continued therapy is recommended.  Rationale/Recommendations:  Progress mobility at U.      Note: Documenting PT did not see pt on this date. Information obtained from chart review.

## 2017-05-13 NOTE — PLAN OF CARE
Problem: Individualization  Goal: Patient Preferences  Outcome: Improving  VSS, Disoriented x4 at baseline. Lethargic all day, awakes to voice, family at bedside, fed patient ate 50%. Meds crushed with apple sauce LS diminished. Repositioned with A2, total bed bath today, incontinent x2 this shift. plans to dc back to memory care today. Will cont to monitor.

## 2017-05-13 NOTE — PLAN OF CARE
Problem: Goal Outcome Summary  Goal: Goal Outcome Summary  Outcome: No Change  Vss, no temps. Confused at baseline. Speech is garbles sometimes clear, can't express needs well. Fidgets with hands grabs and resists you when turning or repositioning. Otherwise calm and cooperative with cares. Up to chair with lift today a couple hours for supper. repositioned q1-2 hours. Incontinent of moderate amount of urine, no bm today. Had smear this shift.   Family here for supper.   02 stable on RA most of shift. Doesn't follow to do deep breathing.   Slept most of shift, wakes to sound of voice.   Lungs diminished in bases. On room air.   scds on.   +bs, unknown flatus, had small bm this shift.   Pale in color.  Did not need extra prn dose of seroquel,.

## 2017-05-14 NOTE — PROGRESS NOTES
Transition Communication Hand-off for Care Transitions to Next Level of Care Provider    Name: Eder Pearson  MRN #: [id number]  Primary Care Provider: [facility]     Primary Clinic: [address] [place], [place] [id number]  [facility]   Reason for Hospitalization:  Delirium [R41.0]  PNA (pneumonia) [J18.9]  Acute bacterial conjunctivitis of right eye [H10.31]  Admit Date/Time: 5/5/2017  1:13 PM  Discharge Date: 5/13/17  Payor Source: Payor: MEDICARE / Plan: MEDICARE / Product Type: Medicare /     Readmission Assessment Measure (EDSON) Risk Score/category: Elevated    Plan of Care Goals/Milestone Events:   Patient Goals:   Medical Goals   Short-term Follow soft mechanical with  nectar-thickened liquids   Long-term Follow up with the [facility] physician per  routine.         Reason for Communication Hand-off Referral: Admission diagnoses: PN    Discharge Plan:  Discharge Plan:       Most Recent Value    Disposition Comments N/A    Concerns To Be Addressed no discharge needs identified           Concern for non-adherence with plan of care:   Y/N No  Discharge Needs Assessment:  Needs       Most Recent Value    Equipment Currently Used at Home lift device    Transportation Available Medicaid transportation    # of Referrals Placed by [facility] Communication hand-offs to next level of Care Providers          Already enrolled in Tele-monitoring program and name of program:  n/a  Follow-up specialty is recommended: No    Follow-up plan:  No future appointments.    Any outstanding tests or procedures:  n/a    Referrals     Future Labs/Procedures    Physical Therapy Adult Consult     Comments:    Evaluate and treat as clinically indicated.    Reason:  Work towards previous baseline    Speech Language Path Adult Consult     Comments:    Evaluate and treat as clinically indicated.    Reason:  Newly identified aspiration.            Key Recommendations:  Follow soft  mechanical with nectar-thickened liquids/ Resume usual activity, (pivot with assist)    Mechelle Ferro, RN, BSN, PHN, CTS  Care Transitions Specialist  [facility]    AVS/Discharge Summary is the source of truth; this is a helpful guide for improved communication of patient story

## 2017-08-23 ENCOUNTER — DOCUMENTATION ONLY (OUTPATIENT)
Dept: OTHER | Facility: CLINIC | Age: 82
End: 2017-08-23

## 2017-08-23 DIAGNOSIS — Z71.89 ACP (ADVANCE CARE PLANNING): Chronic | ICD-10-CM

## 2018-01-06 NOTE — PLAN OF CARE
Problem: Goal Outcome Summary  Goal: Goal Outcome Summary  Confussed combative with repostioing and cares.  3L NC removes NC 85% on Room Air.  LS dim infrequent non productive cough.  plan to dc back to [facility] care facility.           patient

## 2018-03-28 ENCOUNTER — RECORDS - HEALTHEAST (OUTPATIENT)
Dept: LAB | Facility: CLINIC | Age: 83
End: 2018-03-28

## 2018-03-29 LAB
ALBUMIN SERPL-MCNC: 3.6 G/DL (ref 3.5–5)
ALP SERPL-CCNC: 57 U/L (ref 45–120)
ALT SERPL W P-5'-P-CCNC: <9 U/L (ref 0–45)
ANION GAP SERPL CALCULATED.3IONS-SCNC: 8 MMOL/L (ref 5–18)
AST SERPL W P-5'-P-CCNC: 15 U/L (ref 0–40)
BASOPHILS # BLD AUTO: 0.1 THOU/UL (ref 0–0.2)
BASOPHILS NFR BLD AUTO: 1 % (ref 0–2)
BILIRUB SERPL-MCNC: 1 MG/DL (ref 0–1)
BUN SERPL-MCNC: 18 MG/DL (ref 8–28)
CALCIUM SERPL-MCNC: 9.3 MG/DL (ref 8.5–10.5)
CHLORIDE BLD-SCNC: 104 MMOL/L (ref 98–107)
CO2 SERPL-SCNC: 27 MMOL/L (ref 22–31)
CREAT SERPL-MCNC: 0.91 MG/DL (ref 0.7–1.3)
EOSINOPHIL # BLD AUTO: 0.2 THOU/UL (ref 0–0.4)
EOSINOPHIL NFR BLD AUTO: 2 % (ref 0–6)
ERYTHROCYTE [DISTWIDTH] IN BLOOD BY AUTOMATED COUNT: 16.9 % (ref 11–14.5)
GFR SERPL CREATININE-BSD FRML MDRD: >60 ML/MIN/1.73M2
GLUCOSE BLD-MCNC: 89 MG/DL (ref 70–125)
HCT VFR BLD AUTO: 35.3 % (ref 40–54)
HGB BLD-MCNC: 11.2 G/DL (ref 14–18)
LYMPHOCYTES # BLD AUTO: 1.6 THOU/UL (ref 0.8–4.4)
LYMPHOCYTES NFR BLD AUTO: 25 % (ref 20–40)
MCH RBC QN AUTO: 32.7 PG (ref 27–34)
MCHC RBC AUTO-ENTMCNC: 31.7 G/DL (ref 32–36)
MCV RBC AUTO: 103 FL (ref 80–100)
MONOCYTES # BLD AUTO: 0.3 THOU/UL (ref 0–0.9)
MONOCYTES NFR BLD AUTO: 5 % (ref 2–10)
NEUTROPHILS # BLD AUTO: 4.2 THOU/UL (ref 2–7.7)
NEUTROPHILS NFR BLD AUTO: 66 % (ref 50–70)
PLATELET # BLD AUTO: 289 THOU/UL (ref 140–440)
PMV BLD AUTO: 12.4 FL (ref 8.5–12.5)
POTASSIUM BLD-SCNC: 4.5 MMOL/L (ref 3.5–5)
PROT SERPL-MCNC: 6.8 G/DL (ref 6–8)
RBC # BLD AUTO: 3.42 MILL/UL (ref 4.4–6.2)
SODIUM SERPL-SCNC: 139 MMOL/L (ref 136–145)
WBC: 6.3 THOU/UL (ref 4–11)

## 2018-09-04 ENCOUNTER — RECORDS - HEALTHEAST (OUTPATIENT)
Dept: LAB | Facility: CLINIC | Age: 83
End: 2018-09-04

## 2018-09-04 LAB
ANION GAP SERPL CALCULATED.3IONS-SCNC: 12 MMOL/L (ref 5–18)
BUN SERPL-MCNC: 26 MG/DL (ref 8–28)
CALCIUM SERPL-MCNC: 9 MG/DL (ref 8.5–10.5)
CHLORIDE BLD-SCNC: 108 MMOL/L (ref 98–107)
CO2 SERPL-SCNC: 21 MMOL/L (ref 22–31)
CREAT SERPL-MCNC: 0.83 MG/DL (ref 0.7–1.3)
ERYTHROCYTE [DISTWIDTH] IN BLOOD BY AUTOMATED COUNT: 18.1 % (ref 11–14.5)
GFR SERPL CREATININE-BSD FRML MDRD: >60 ML/MIN/1.73M2
GLUCOSE BLD-MCNC: 58 MG/DL (ref 70–125)
HCT VFR BLD AUTO: 34.2 % (ref 40–54)
HGB BLD-MCNC: 11.3 G/DL (ref 14–18)
MCH RBC QN AUTO: 32.8 PG (ref 27–34)
MCHC RBC AUTO-ENTMCNC: 33 G/DL (ref 32–36)
MCV RBC AUTO: 99 FL (ref 80–100)
PLATELET # BLD AUTO: 272 THOU/UL (ref 140–440)
PMV BLD AUTO: 13 FL (ref 8.5–12.5)
POTASSIUM BLD-SCNC: 4.4 MMOL/L (ref 3.5–5)
RBC # BLD AUTO: 3.44 MILL/UL (ref 4.4–6.2)
SODIUM SERPL-SCNC: 141 MMOL/L (ref 136–145)
WBC: 6.9 THOU/UL (ref 4–11)

## 2018-09-05 LAB — 25(OH)D3 SERPL-MCNC: 47 NG/ML (ref 30–80)

## 2019-01-01 ENCOUNTER — NURSING HOME VISIT (OUTPATIENT)
Dept: GERIATRICS | Facility: CLINIC | Age: 84
End: 2019-01-01
Payer: MEDICARE

## 2019-01-01 ENCOUNTER — TRANSFERRED RECORDS (OUTPATIENT)
Dept: HEALTH INFORMATION MANAGEMENT | Facility: CLINIC | Age: 84
End: 2019-01-01

## 2019-01-01 ENCOUNTER — NURSING HOME VISIT (OUTPATIENT)
Dept: GERIATRICS | Facility: CLINIC | Age: 84
End: 2019-01-01
Payer: COMMERCIAL

## 2019-01-01 ENCOUNTER — DOCUMENTATION ONLY (OUTPATIENT)
Dept: OTHER | Facility: CLINIC | Age: 84
End: 2019-01-01

## 2019-01-01 ENCOUNTER — APPOINTMENT (OUTPATIENT)
Dept: GENERAL RADIOLOGY | Facility: CLINIC | Age: 84
DRG: 177 | End: 2019-01-01
Attending: EMERGENCY MEDICINE
Payer: MEDICARE

## 2019-01-01 ENCOUNTER — AMBULATORY - HEALTHEAST (OUTPATIENT)
Dept: OTHER | Facility: CLINIC | Age: 84
End: 2019-01-01

## 2019-01-01 ENCOUNTER — APPOINTMENT (OUTPATIENT)
Dept: SPEECH THERAPY | Facility: CLINIC | Age: 84
DRG: 177 | End: 2019-01-01
Attending: HOSPITALIST
Payer: MEDICARE

## 2019-01-01 ENCOUNTER — HOSPITAL ENCOUNTER (INPATIENT)
Facility: CLINIC | Age: 84
LOS: 3 days | Discharge: NURSING, REHAB OR LONG-TERM CARE | DRG: 177 | End: 2019-05-22
Attending: EMERGENCY MEDICINE | Admitting: HOSPITALIST
Payer: MEDICARE

## 2019-01-01 ENCOUNTER — RECORDS - HEALTHEAST (OUTPATIENT)
Dept: LAB | Facility: CLINIC | Age: 84
End: 2019-01-01

## 2019-01-01 VITALS
RESPIRATION RATE: 20 BRPM | OXYGEN SATURATION: 95 % | DIASTOLIC BLOOD PRESSURE: 63 MMHG | TEMPERATURE: 96.8 F | SYSTOLIC BLOOD PRESSURE: 155 MMHG | WEIGHT: 163.9 LBS | HEART RATE: 55 BPM | BODY MASS INDEX: 22.23 KG/M2

## 2019-01-01 VITALS
HEIGHT: 72 IN | HEART RATE: 71 BPM | WEIGHT: 168.2 LBS | DIASTOLIC BLOOD PRESSURE: 74 MMHG | TEMPERATURE: 96.4 F | BODY MASS INDEX: 22.78 KG/M2 | OXYGEN SATURATION: 95 % | SYSTOLIC BLOOD PRESSURE: 126 MMHG | RESPIRATION RATE: 16 BRPM

## 2019-01-01 VITALS
WEIGHT: 167.8 LBS | HEART RATE: 66 BPM | OXYGEN SATURATION: 94 % | BODY MASS INDEX: 22.76 KG/M2 | SYSTOLIC BLOOD PRESSURE: 122 MMHG | DIASTOLIC BLOOD PRESSURE: 71 MMHG | RESPIRATION RATE: 18 BRPM | TEMPERATURE: 96.2 F

## 2019-01-01 VITALS
BODY MASS INDEX: 22.15 KG/M2 | WEIGHT: 163.3 LBS | HEART RATE: 71 BPM | TEMPERATURE: 97.3 F | SYSTOLIC BLOOD PRESSURE: 132 MMHG | OXYGEN SATURATION: 90 % | RESPIRATION RATE: 16 BRPM | DIASTOLIC BLOOD PRESSURE: 71 MMHG

## 2019-01-01 VITALS
DIASTOLIC BLOOD PRESSURE: 64 MMHG | WEIGHT: 157.8 LBS | SYSTOLIC BLOOD PRESSURE: 122 MMHG | HEART RATE: 65 BPM | BODY MASS INDEX: 21.4 KG/M2 | OXYGEN SATURATION: 92 % | TEMPERATURE: 98.2 F | RESPIRATION RATE: 18 BRPM

## 2019-01-01 VITALS
WEIGHT: 172.1 LBS | HEIGHT: 72 IN | SYSTOLIC BLOOD PRESSURE: 140 MMHG | OXYGEN SATURATION: 92 % | DIASTOLIC BLOOD PRESSURE: 55 MMHG | BODY MASS INDEX: 23.31 KG/M2 | TEMPERATURE: 97 F | HEART RATE: 66 BPM | RESPIRATION RATE: 20 BRPM

## 2019-01-01 VITALS
RESPIRATION RATE: 16 BRPM | OXYGEN SATURATION: 96 % | DIASTOLIC BLOOD PRESSURE: 82 MMHG | SYSTOLIC BLOOD PRESSURE: 128 MMHG | WEIGHT: 168.2 LBS | HEART RATE: 74 BPM | TEMPERATURE: 97.2 F | BODY MASS INDEX: 22.81 KG/M2

## 2019-01-01 DIAGNOSIS — D64.9 ANEMIA, UNSPECIFIED TYPE: ICD-10-CM

## 2019-01-01 DIAGNOSIS — R25.2 SPASTICITY: Primary | ICD-10-CM

## 2019-01-01 DIAGNOSIS — F02.818 LATE ONSET ALZHEIMER'S DISEASE WITH BEHAVIORAL DISTURBANCE (H): Primary | ICD-10-CM

## 2019-01-01 DIAGNOSIS — R35.0 URINARY FREQUENCY: ICD-10-CM

## 2019-01-01 DIAGNOSIS — F02.818 LATE ONSET ALZHEIMER'S DISEASE WITH BEHAVIORAL DISTURBANCE (H): ICD-10-CM

## 2019-01-01 DIAGNOSIS — R13.10 DYSPHAGIA, UNSPECIFIED TYPE: Primary | ICD-10-CM

## 2019-01-01 DIAGNOSIS — R13.10 DYSPHAGIA, UNSPECIFIED TYPE: ICD-10-CM

## 2019-01-01 DIAGNOSIS — E53.8 FOLATE DEFICIENCY: ICD-10-CM

## 2019-01-01 DIAGNOSIS — R09.02 HYPOXIA: ICD-10-CM

## 2019-01-01 DIAGNOSIS — J69.0 ASPIRATION PNEUMONIA, UNSPECIFIED ASPIRATION PNEUMONIA TYPE, UNSPECIFIED LATERALITY, UNSPECIFIED PART OF LUNG (H): Primary | ICD-10-CM

## 2019-01-01 DIAGNOSIS — G30.1 LATE ONSET ALZHEIMER'S DISEASE WITH BEHAVIORAL DISTURBANCE (H): ICD-10-CM

## 2019-01-01 DIAGNOSIS — G30.1 LATE ONSET ALZHEIMER'S DISEASE WITH BEHAVIORAL DISTURBANCE (H): Primary | ICD-10-CM

## 2019-01-01 DIAGNOSIS — R05.9 COUGH: ICD-10-CM

## 2019-01-01 DIAGNOSIS — R25.2 SPASTICITY: ICD-10-CM

## 2019-01-01 DIAGNOSIS — R06.02 SHORTNESS OF BREATH: ICD-10-CM

## 2019-01-01 DIAGNOSIS — D72.825 BANDEMIA: ICD-10-CM

## 2019-01-01 DIAGNOSIS — J18.9 PNEUMONIA OF RIGHT LOWER LOBE DUE TO INFECTIOUS ORGANISM: ICD-10-CM

## 2019-01-01 LAB
ANION GAP SERPL CALCULATED.3IONS-SCNC: 3 MMOL/L (ref 5–18)
ANION GAP SERPL CALCULATED.3IONS-SCNC: 5 MMOL/L (ref 3–14)
ANION GAP SERPL CALCULATED.3IONS-SCNC: 5 MMOL/L (ref 3–14)
BACTERIA SPEC CULT: NO GROWTH
BACTERIA SPEC CULT: NO GROWTH
BASOPHILS # BLD AUTO: 0 10E9/L (ref 0–0.2)
BASOPHILS NFR BLD AUTO: 0.3 %
BUN SERPL-MCNC: 16 MG/DL (ref 7–30)
BUN SERPL-MCNC: 18 MG/DL (ref 7–30)
BUN SERPL-MCNC: 24 MG/DL (ref 8–28)
CALCIUM SERPL-MCNC: 8 MG/DL (ref 8.5–10.1)
CALCIUM SERPL-MCNC: 8.9 MG/DL (ref 8.5–10.1)
CALCIUM SERPL-MCNC: 9.8 MG/DL (ref 8.5–10.5)
CHLORIDE BLD-SCNC: 108 MMOL/L (ref 98–107)
CHLORIDE SERPL-SCNC: 105 MMOL/L (ref 94–109)
CHLORIDE SERPL-SCNC: 108 MMOL/L (ref 94–109)
CO2 SERPL-SCNC: 28 MMOL/L (ref 20–32)
CO2 SERPL-SCNC: 29 MMOL/L (ref 20–32)
CO2 SERPL-SCNC: 33 MMOL/L (ref 22–31)
CREAT SERPL-MCNC: 0.77 MG/DL (ref 0.66–1.25)
CREAT SERPL-MCNC: 0.82 MG/DL (ref 0.66–1.25)
CREAT SERPL-MCNC: 0.85 MG/DL (ref 0.7–1.3)
DIFFERENTIAL METHOD BLD: ABNORMAL
EOSINOPHIL # BLD AUTO: 0.1 10E9/L (ref 0–0.7)
EOSINOPHIL NFR BLD AUTO: 0.6 %
ERYTHROCYTE [DISTWIDTH] IN BLOOD BY AUTOMATED COUNT: 17.4 % (ref 10–15)
ERYTHROCYTE [DISTWIDTH] IN BLOOD BY AUTOMATED COUNT: 17.4 % (ref 11–14.5)
ERYTHROCYTE [DISTWIDTH] IN BLOOD BY AUTOMATED COUNT: 17.6 % (ref 10–15)
FOLATE: 4.2 NG/ML
GFR SERPL CREATININE-BSD FRML MDRD: 80 ML/MIN/{1.73_M2}
GFR SERPL CREATININE-BSD FRML MDRD: 82 ML/MIN/{1.73_M2}
GFR SERPL CREATININE-BSD FRML MDRD: >60 ML/MIN/1.73M2
GLUCOSE BLD-MCNC: 89 MG/DL (ref 70–125)
GLUCOSE SERPL-MCNC: 89 MG/DL (ref 70–99)
GLUCOSE SERPL-MCNC: 93 MG/DL (ref 70–99)
HCT VFR BLD AUTO: 33.4 % (ref 40–53)
HCT VFR BLD AUTO: 35.5 % (ref 40–54)
HCT VFR BLD AUTO: 38.8 % (ref 40–53)
HEMOGLOBIN: 10.2 G/DL (ref 13.5–17.5)
HEMOGLOBIN: 9.3 G/DL (ref 13.5–17.5)
HGB BLD-MCNC: 10.6 G/DL (ref 13.3–17.7)
HGB BLD-MCNC: 11.3 G/DL (ref 14–18)
HGB BLD-MCNC: 12.7 G/DL (ref 13.3–17.7)
IMM GRANULOCYTES # BLD: 0 10E9/L (ref 0–0.4)
IMM GRANULOCYTES NFR BLD: 0.3 %
IRON SATURATION %: 53 %
IRON: 103 UG/DL
LACTATE BLD-SCNC: 1.2 MMOL/L (ref 0.7–2)
LYMPHOCYTES # BLD AUTO: 1.5 10E9/L (ref 0.8–5.3)
LYMPHOCYTES NFR BLD AUTO: 12.7 %
Lab: NORMAL
Lab: NORMAL
MAGNESIUM SERPL-MCNC: 1.7 MG/DL (ref 1.6–2.3)
MCH RBC QN AUTO: 32.8 PG (ref 26.5–33)
MCH RBC QN AUTO: 32.8 PG (ref 27–34)
MCH RBC QN AUTO: 33.6 PG (ref 26.5–33)
MCHC RBC AUTO-ENTMCNC: 31.7 G/DL (ref 31.5–36.5)
MCHC RBC AUTO-ENTMCNC: 31.8 G/DL (ref 32–36)
MCHC RBC AUTO-ENTMCNC: 32.7 G/DL (ref 31.5–36.5)
MCV RBC AUTO: 103 FL (ref 78–100)
MCV RBC AUTO: 103 FL (ref 78–100)
MCV RBC AUTO: 103 FL (ref 80–100)
MONOCYTES # BLD AUTO: 0.6 10E9/L (ref 0–1.3)
MONOCYTES NFR BLD AUTO: 5.1 %
NEUTROPHILS # BLD AUTO: 9.4 10E9/L (ref 1.6–8.3)
NEUTROPHILS NFR BLD AUTO: 81 %
NRBC # BLD AUTO: 0 10*3/UL
NRBC BLD AUTO-RTO: 0 /100
PLATELET # BLD AUTO: 277 THOU/UL (ref 140–440)
PLATELET # BLD AUTO: 281 10E9/L (ref 150–450)
PLATELET # BLD AUTO: 373 10E9/L (ref 150–450)
PMV BLD AUTO: 12.7 FL (ref 8.5–12.5)
POTASSIUM BLD-SCNC: 4.7 MMOL/L (ref 3.5–5)
POTASSIUM SERPL-SCNC: 3.6 MMOL/L (ref 3.4–5.3)
POTASSIUM SERPL-SCNC: 4 MMOL/L (ref 3.4–5.3)
PROCALCITONIN SERPL-MCNC: <0.05 NG/ML
RBC # BLD AUTO: 3.23 10E12/L (ref 4.4–5.9)
RBC # BLD AUTO: 3.44 MILL/UL (ref 4.4–6.2)
RBC # BLD AUTO: 3.78 10E12/L (ref 4.4–5.9)
SODIUM SERPL-SCNC: 139 MMOL/L (ref 133–144)
SODIUM SERPL-SCNC: 141 MMOL/L (ref 133–144)
SODIUM SERPL-SCNC: 144 MMOL/L (ref 136–145)
SPECIMEN SOURCE: NORMAL
SPECIMEN SOURCE: NORMAL
TIBC - QUEST: 196
TRANSFERRIN: 157
VIT B12 SERPL-MCNC: 1185 PG/ML (ref 213–816)
VIT B12 SERPL-MCNC: 938 PG/ML
WBC # BLD AUTO: 11.6 10E9/L (ref 4–11)
WBC # BLD AUTO: 9.8 10E9/L (ref 4–11)
WBC: 8.1 THOU/UL (ref 4–11)

## 2019-01-01 PROCEDURE — 36415 COLL VENOUS BLD VENIPUNCTURE: CPT | Performed by: EMERGENCY MEDICINE

## 2019-01-01 PROCEDURE — 71046 X-RAY EXAM CHEST 2 VIEWS: CPT

## 2019-01-01 PROCEDURE — 36415 COLL VENOUS BLD VENIPUNCTURE: CPT | Performed by: HOSPITALIST

## 2019-01-01 PROCEDURE — 80048 BASIC METABOLIC PNL TOTAL CA: CPT | Performed by: HOSPITALIST

## 2019-01-01 PROCEDURE — 25800030 ZZH RX IP 258 OP 636: Performed by: HOSPITALIST

## 2019-01-01 PROCEDURE — A9270 NON-COVERED ITEM OR SERVICE: HCPCS | Performed by: HOSPITALIST

## 2019-01-01 PROCEDURE — 99223 1ST HOSP IP/OBS HIGH 75: CPT | Mod: AI | Performed by: HOSPITALIST

## 2019-01-01 PROCEDURE — 84145 PROCALCITONIN (PCT): CPT | Performed by: HOSPITALIST

## 2019-01-01 PROCEDURE — 25000132 ZZH RX MED GY IP 250 OP 250 PS 637: Performed by: HOSPITALIST

## 2019-01-01 PROCEDURE — 83605 ASSAY OF LACTIC ACID: CPT | Performed by: EMERGENCY MEDICINE

## 2019-01-01 PROCEDURE — 96365 THER/PROPH/DIAG IV INF INIT: CPT

## 2019-01-01 PROCEDURE — 99232 SBSQ HOSP IP/OBS MODERATE 35: CPT | Performed by: INTERNAL MEDICINE

## 2019-01-01 PROCEDURE — 71045 X-RAY EXAM CHEST 1 VIEW: CPT

## 2019-01-01 PROCEDURE — 25000128 H RX IP 250 OP 636: Performed by: EMERGENCY MEDICINE

## 2019-01-01 PROCEDURE — 99310 SBSQ NF CARE HIGH MDM 45: CPT | Performed by: NURSE PRACTITIONER

## 2019-01-01 PROCEDURE — A9270 NON-COVERED ITEM OR SERVICE: HCPCS | Performed by: INTERNAL MEDICINE

## 2019-01-01 PROCEDURE — 80048 BASIC METABOLIC PNL TOTAL CA: CPT | Performed by: EMERGENCY MEDICINE

## 2019-01-01 PROCEDURE — 85025 COMPLETE CBC W/AUTO DIFF WBC: CPT | Performed by: EMERGENCY MEDICINE

## 2019-01-01 PROCEDURE — 12000000 ZZH R&B MED SURG/OB

## 2019-01-01 PROCEDURE — 83735 ASSAY OF MAGNESIUM: CPT | Performed by: HOSPITALIST

## 2019-01-01 PROCEDURE — 99309 SBSQ NF CARE MODERATE MDM 30: CPT | Performed by: NURSE PRACTITIONER

## 2019-01-01 PROCEDURE — 85027 COMPLETE CBC AUTOMATED: CPT | Performed by: HOSPITALIST

## 2019-01-01 PROCEDURE — 25800030 ZZH RX IP 258 OP 636: Performed by: INTERNAL MEDICINE

## 2019-01-01 PROCEDURE — 25000132 ZZH RX MED GY IP 250 OP 250 PS 637: Performed by: INTERNAL MEDICINE

## 2019-01-01 PROCEDURE — 99285 EMERGENCY DEPT VISIT HI MDM: CPT | Mod: 25

## 2019-01-01 PROCEDURE — 92610 EVALUATE SWALLOWING FUNCTION: CPT | Mod: GN | Performed by: SPEECH-LANGUAGE PATHOLOGIST

## 2019-01-01 PROCEDURE — 25000128 H RX IP 250 OP 636: Performed by: HOSPITALIST

## 2019-01-01 PROCEDURE — 87040 BLOOD CULTURE FOR BACTERIA: CPT | Performed by: EMERGENCY MEDICINE

## 2019-01-01 PROCEDURE — 99239 HOSP IP/OBS DSCHRG MGMT >30: CPT | Performed by: INTERNAL MEDICINE

## 2019-01-01 PROCEDURE — 96361 HYDRATE IV INFUSION ADD-ON: CPT

## 2019-01-01 RX ORDER — BACLOFEN 10 MG/1
5 TABLET ORAL 3 TIMES DAILY
COMMUNITY
End: 2019-01-01

## 2019-01-01 RX ORDER — POTASSIUM CHLORIDE 29.8 MG/ML
20 INJECTION INTRAVENOUS
Status: DISCONTINUED | OUTPATIENT
Start: 2019-01-01 | End: 2019-01-01 | Stop reason: HOSPADM

## 2019-01-01 RX ORDER — PROCHLORPERAZINE 25 MG
12.5 SUPPOSITORY, RECTAL RECTAL EVERY 12 HOURS PRN
Status: DISCONTINUED | OUTPATIENT
Start: 2019-01-01 | End: 2019-01-01 | Stop reason: HOSPADM

## 2019-01-01 RX ORDER — BACLOFEN 5 MG/1
TABLET ORAL
Start: 2019-01-01

## 2019-01-01 RX ORDER — ONDANSETRON 4 MG/1
4 TABLET, ORALLY DISINTEGRATING ORAL EVERY 6 HOURS PRN
Status: DISCONTINUED | OUTPATIENT
Start: 2019-01-01 | End: 2019-01-01 | Stop reason: HOSPADM

## 2019-01-01 RX ORDER — POTASSIUM CHLORIDE 7.45 MG/ML
10 INJECTION INTRAVENOUS
Status: DISCONTINUED | OUTPATIENT
Start: 2019-01-01 | End: 2019-01-01 | Stop reason: HOSPADM

## 2019-01-01 RX ORDER — AMOXICILLIN 250 MG
1 CAPSULE ORAL 2 TIMES DAILY PRN
Status: DISCONTINUED | OUTPATIENT
Start: 2019-01-01 | End: 2019-01-01 | Stop reason: HOSPADM

## 2019-01-01 RX ORDER — ACETAMINOPHEN 325 MG/1
650 TABLET ORAL EVERY 4 HOURS PRN
Status: DISCONTINUED | OUTPATIENT
Start: 2019-01-01 | End: 2019-01-01 | Stop reason: HOSPADM

## 2019-01-01 RX ORDER — BISACODYL 10 MG
10 SUPPOSITORY, RECTAL RECTAL DAILY PRN
Status: DISCONTINUED | OUTPATIENT
Start: 2019-01-01 | End: 2019-01-01 | Stop reason: HOSPADM

## 2019-01-01 RX ORDER — DULOXETIN HYDROCHLORIDE 20 MG/1
20 CAPSULE, DELAYED RELEASE ORAL 2 TIMES DAILY
COMMUNITY

## 2019-01-01 RX ORDER — POTASSIUM CHLORIDE 1.5 G/1.58G
20-40 POWDER, FOR SOLUTION ORAL
Status: DISCONTINUED | OUTPATIENT
Start: 2019-01-01 | End: 2019-01-01 | Stop reason: HOSPADM

## 2019-01-01 RX ORDER — POLYETHYLENE GLYCOL 3350 17 G/17G
17 POWDER, FOR SOLUTION ORAL 2 TIMES DAILY PRN
Status: DISCONTINUED | OUTPATIENT
Start: 2019-01-01 | End: 2019-01-01 | Stop reason: HOSPADM

## 2019-01-01 RX ORDER — BACLOFEN 5 MG/1
5 TABLET ORAL 3 TIMES DAILY
Start: 2019-01-01 | End: 2019-01-01

## 2019-01-01 RX ORDER — QUETIAPINE FUMARATE 25 MG/1
25 TABLET, FILM COATED ORAL AT BEDTIME
Status: CANCELLED | OUTPATIENT
Start: 2019-01-01

## 2019-01-01 RX ORDER — TAMSULOSIN HYDROCHLORIDE 0.4 MG/1
0.4 CAPSULE ORAL AT BEDTIME
Status: DISCONTINUED | OUTPATIENT
Start: 2019-01-01 | End: 2019-01-01 | Stop reason: HOSPADM

## 2019-01-01 RX ORDER — SODIUM CHLORIDE 9 MG/ML
1000 INJECTION, SOLUTION INTRAVENOUS CONTINUOUS
Status: DISCONTINUED | OUTPATIENT
Start: 2019-01-01 | End: 2019-01-01

## 2019-01-01 RX ORDER — ONDANSETRON 2 MG/ML
4 INJECTION INTRAMUSCULAR; INTRAVENOUS EVERY 6 HOURS PRN
Status: DISCONTINUED | OUTPATIENT
Start: 2019-01-01 | End: 2019-01-01 | Stop reason: HOSPADM

## 2019-01-01 RX ORDER — SODIUM CHLORIDE 9 MG/ML
INJECTION, SOLUTION INTRAVENOUS CONTINUOUS
Status: DISCONTINUED | OUTPATIENT
Start: 2019-01-01 | End: 2019-01-01

## 2019-01-01 RX ORDER — AMOXICILLIN 250 MG
2 CAPSULE ORAL 2 TIMES DAILY PRN
Status: DISCONTINUED | OUTPATIENT
Start: 2019-01-01 | End: 2019-01-01 | Stop reason: HOSPADM

## 2019-01-01 RX ORDER — NALOXONE HYDROCHLORIDE 0.4 MG/ML
.1-.4 INJECTION, SOLUTION INTRAMUSCULAR; INTRAVENOUS; SUBCUTANEOUS
Status: DISCONTINUED | OUTPATIENT
Start: 2019-01-01 | End: 2019-01-01 | Stop reason: HOSPADM

## 2019-01-01 RX ORDER — LANOLIN ALCOHOL/MO/W.PET/CERES
3 CREAM (GRAM) TOPICAL
Status: DISCONTINUED | OUTPATIENT
Start: 2019-01-01 | End: 2019-01-01 | Stop reason: HOSPADM

## 2019-01-01 RX ORDER — AMOXICILLIN AND CLAVULANATE POTASSIUM 400; 57 MG/5ML; MG/5ML
500 POWDER, FOR SUSPENSION ORAL EVERY 8 HOURS SCHEDULED
Status: DISCONTINUED | OUTPATIENT
Start: 2019-01-01 | End: 2019-01-01 | Stop reason: HOSPADM

## 2019-01-01 RX ORDER — PROCHLORPERAZINE MALEATE 5 MG
5 TABLET ORAL EVERY 6 HOURS PRN
Status: DISCONTINUED | OUTPATIENT
Start: 2019-01-01 | End: 2019-01-01 | Stop reason: HOSPADM

## 2019-01-01 RX ORDER — MEMANTINE HYDROCHLORIDE 5 MG/1
10 TABLET ORAL DAILY
Status: DISCONTINUED | OUTPATIENT
Start: 2019-01-01 | End: 2019-01-01 | Stop reason: HOSPADM

## 2019-01-01 RX ORDER — MAGNESIUM SULFATE HEPTAHYDRATE 40 MG/ML
4 INJECTION, SOLUTION INTRAVENOUS EVERY 4 HOURS PRN
Status: DISCONTINUED | OUTPATIENT
Start: 2019-01-01 | End: 2019-01-01 | Stop reason: HOSPADM

## 2019-01-01 RX ORDER — FLUORIDE TOOTHPASTE
15 TOOTHPASTE DENTAL 4 TIMES DAILY PRN
Status: DISCONTINUED | OUTPATIENT
Start: 2019-01-01 | End: 2019-01-01 | Stop reason: HOSPADM

## 2019-01-01 RX ORDER — POTASSIUM CL/LIDO/0.9 % NACL 10MEQ/0.1L
10 INTRAVENOUS SOLUTION, PIGGYBACK (ML) INTRAVENOUS
Status: DISCONTINUED | OUTPATIENT
Start: 2019-01-01 | End: 2019-01-01 | Stop reason: HOSPADM

## 2019-01-01 RX ORDER — POTASSIUM CHLORIDE 1500 MG/1
20-40 TABLET, EXTENDED RELEASE ORAL
Status: DISCONTINUED | OUTPATIENT
Start: 2019-01-01 | End: 2019-01-01 | Stop reason: HOSPADM

## 2019-01-01 RX ORDER — DULOXETIN HYDROCHLORIDE 20 MG/1
20 CAPSULE, DELAYED RELEASE ORAL 2 TIMES DAILY
Status: DISCONTINUED | OUTPATIENT
Start: 2019-01-01 | End: 2019-01-01 | Stop reason: HOSPADM

## 2019-01-01 RX ORDER — AMPICILLIN AND SULBACTAM 2; 1 G/1; G/1
3 INJECTION, POWDER, FOR SOLUTION INTRAMUSCULAR; INTRAVENOUS EVERY 6 HOURS
Status: DISCONTINUED | OUTPATIENT
Start: 2019-01-01 | End: 2019-01-01

## 2019-01-01 RX ORDER — AMOXICILLIN AND CLAVULANATE POTASSIUM 400; 57 MG/5ML; MG/5ML
500 POWDER, FOR SUSPENSION ORAL EVERY 8 HOURS
DISCHARGE
Start: 2019-01-01 | End: 2019-01-01

## 2019-01-01 RX ORDER — FOLIC ACID 1 MG/1
1 TABLET ORAL DAILY
Start: 2019-01-01

## 2019-01-01 RX ADMIN — MEMANTINE 10 MG: 5 TABLET ORAL at 10:48

## 2019-01-01 RX ADMIN — SODIUM CHLORIDE: 9 INJECTION, SOLUTION INTRAVENOUS at 10:34

## 2019-01-01 RX ADMIN — DULOXETINE HYDROCHLORIDE 20 MG: 20 CAPSULE, DELAYED RELEASE ORAL at 12:16

## 2019-01-01 RX ADMIN — SODIUM CHLORIDE: 9 INJECTION, SOLUTION INTRAVENOUS at 09:36

## 2019-01-01 RX ADMIN — DULOXETINE HYDROCHLORIDE 20 MG: 20 CAPSULE, DELAYED RELEASE ORAL at 21:38

## 2019-01-01 RX ADMIN — DULOXETINE HYDROCHLORIDE 20 MG: 20 CAPSULE, DELAYED RELEASE ORAL at 10:53

## 2019-01-01 RX ADMIN — SODIUM CHLORIDE 1000 ML: 9 INJECTION, SOLUTION INTRAVENOUS at 18:09

## 2019-01-01 RX ADMIN — AMOXICILLIN AND CLAVULANATE POTASSIUM 500 MG: 400; 57 POWDER, FOR SUSPENSION ORAL at 10:53

## 2019-01-01 RX ADMIN — AMPICILLIN SODIUM AND SULBACTAM SODIUM 3 G: 2; 1 INJECTION, POWDER, FOR SOLUTION INTRAMUSCULAR; INTRAVENOUS at 00:18

## 2019-01-01 RX ADMIN — MEMANTINE 10 MG: 5 TABLET ORAL at 12:14

## 2019-01-01 RX ADMIN — AMPICILLIN SODIUM AND SULBACTAM SODIUM 3 G: 2; 1 INJECTION, POWDER, FOR SOLUTION INTRAMUSCULAR; INTRAVENOUS at 18:01

## 2019-01-01 RX ADMIN — SODIUM CHLORIDE: 9 INJECTION, SOLUTION INTRAVENOUS at 22:38

## 2019-01-01 RX ADMIN — SODIUM CHLORIDE: 9 INJECTION, SOLUTION INTRAVENOUS at 21:57

## 2019-01-01 RX ADMIN — AMPICILLIN SODIUM AND SULBACTAM SODIUM 3 G: 2; 1 INJECTION, POWDER, FOR SOLUTION INTRAMUSCULAR; INTRAVENOUS at 00:39

## 2019-01-01 RX ADMIN — DULOXETINE HYDROCHLORIDE 20 MG: 20 CAPSULE, DELAYED RELEASE ORAL at 10:51

## 2019-01-01 RX ADMIN — TAZOBACTAM SODIUM AND PIPERACILLIN SODIUM 4.5 G: 500; 4 INJECTION, SOLUTION INTRAVENOUS at 19:37

## 2019-01-01 RX ADMIN — MEMANTINE 10 MG: 5 TABLET ORAL at 10:53

## 2019-01-01 RX ADMIN — SODIUM CHLORIDE: 9 INJECTION, SOLUTION INTRAVENOUS at 22:03

## 2019-01-01 RX ADMIN — AMPICILLIN SODIUM AND SULBACTAM SODIUM 3 G: 2; 1 INJECTION, POWDER, FOR SOLUTION INTRAMUSCULAR; INTRAVENOUS at 12:13

## 2019-01-01 RX ADMIN — TAMSULOSIN HYDROCHLORIDE 0.4 MG: 0.4 CAPSULE ORAL at 21:58

## 2019-01-01 RX ADMIN — DULOXETINE HYDROCHLORIDE 20 MG: 20 CAPSULE, DELAYED RELEASE ORAL at 21:57

## 2019-01-01 RX ADMIN — TAMSULOSIN HYDROCHLORIDE 0.4 MG: 0.4 CAPSULE ORAL at 21:38

## 2019-01-01 RX ADMIN — AMPICILLIN SODIUM AND SULBACTAM SODIUM 3 G: 2; 1 INJECTION, POWDER, FOR SOLUTION INTRAMUSCULAR; INTRAVENOUS at 05:56

## 2019-01-01 RX ADMIN — AMPICILLIN SODIUM AND SULBACTAM SODIUM 3 G: 2; 1 INJECTION, POWDER, FOR SOLUTION INTRAMUSCULAR; INTRAVENOUS at 05:49

## 2019-01-01 ASSESSMENT — MIFFLIN-ST. JEOR
SCORE: 1503.64
SCORE: 1484.14
SCORE: 1483.68
SCORE: 1480.95

## 2019-01-01 ASSESSMENT — ACTIVITIES OF DAILY LIVING (ADL)
ADLS_ACUITY_SCORE: 41
ADLS_ACUITY_SCORE: 43
ADLS_ACUITY_SCORE: 41
ADLS_ACUITY_SCORE: 43
ADLS_ACUITY_SCORE: 41
ADLS_ACUITY_SCORE: 43

## 2019-05-19 PROBLEM — J69.0 ASPIRATION PNEUMONIA (H): Status: ACTIVE | Noted: 2019-01-01

## 2019-05-19 NOTE — ED NOTES
Bed: ED08  Expected date: 5/19/19  Expected time: 4:27 PM  Means of arrival:   Comments:  DEVIKA 85M

## 2019-05-19 NOTE — ED TRIAGE NOTES
Patient comes via EMS from [facility] for evaluation of increasing shortness of breath. Per report, staff noticed increased SOB, upon arrival O2 was 90%, placed on 2L NC and transported.  Per report patient is at baseline mentation.

## 2019-05-19 NOTE — LETTER
Transition Communication Hand-off for Care Transitions to Next Level of Care Provider    Name: Eder Pearson  : 1933  MRN #: [id number]  Primary Care Provider: [facility]  Primary Care MD Name: Sanjeev dejesus  Primary Clinic: [address] [place] [id number]  [facility]  Reason for Hospitalization:  Cough [R05]  Shortness of breath [R06.02]  Bandemia [D72.825]  Pneumonia of right lower lobe due to infectious organism (H) [J18.1]  Meconium aspiration pneumonia of right lower lobe [P24.01]  Admit Date/Time: 2019  4:41 PM  Discharge Date: 19  Payor Source: Payor: MEDICARE / Plan: MEDICARE / Product Type: Medicare /     Readmission Assessment Measure (EDSON) Risk Score/category: AVERAGE           Reason for Communication Hand-off Referral: Fragility  Difficulty understanding plan of care    Discharge Plan:       Concern for non-adherence with plan of care:   NO  Discharge Needs Assessment:  Needs      Most Recent Value   Anticipated Changes Related to Illness  none   Equipment Currently Used at Home  lift device, wheelchair, manual   # of Referrals Placed by [facility]  External Care Coordination   Assisted Living  -- [[facility] assisted living [phone]]   Home Care  -- [[facility] [phone] ax [phone]]   Skilled Nursing Facility  [facility] [phone], Fax: [phone]   PAS Number  [id number]          Already enrolled in Tele-monitoring program and name of program:  na  Follow-up specialty is recommended: No    Follow-up plan:  No future appointments.    Any outstanding tests or procedures:    Procedures     Future Labs/Procedures    Oxygen - Nasal cannula     Comments:    1-2 Lpm by nasal cannula to keep O2 sats 90% or greater.          Referrals     Future Labs/Procedures    Physical Therapy Adult Consult     Comments:    Evaluate and treat as clinically indicated.    Reason:   Deconditioning/generalized weakness    Speech Language Path Adult Consult     Comments:    Evaluate and treat as clinically indicated.    Reason:  dysphagia        .            Snacks/Supplements Adult: Other; Magic Shake; With Meals      Combination Diet Full Liquid; Honey Thickened Liquids (pre-thickened or use instant food thickener) (By spoon      Key Recommendations:  EDSON AVERAGE. HIGH RISK.  Pt is admitted with aspiration PNA.  He is frail and has dementia.  He lives at [facility].  He also has [facility] RN support for wound care.  Dtg has decided that she wants him at [facility] as she didn't care for the care at [facility].      Recommendations are to /follow-up with nursing home physician.     Kamilla Guthrie    AVS/Discharge Summary is the source of truth; this is a helpful guide for improved communication of patient story.

## 2019-05-19 NOTE — ED PROVIDER NOTES
History     Chief Complaint:  Shortness of Breath    The history is provided by a relative (daughter).      Eder Pearson is a 85 year old male with Alzheimer's disease and prostate cancer who presents with his daughter at the ED for shortness of breath. To note, patient resides in an assisted-living facility. There is nursing staff present during the day but not for overnight coverage. Patient's baseline includes sometimes being cognizant of the present moments but he cannot remember events from his life, per daughter's report. For the past 24 hours, the patient has been significantly more congested and coughing. Today, when the patient's daughter went to visit her father, she found him laying down in his wheelchair extremely congested and with a phlegmy voice. The patient was also noted to be mouth breathing, which he normally does not do, as well as having his mouth hang wide open. She also noted the patient to be running a fever of 100.3 and complaining of a headache and general ill feelings. She has been able to encourage him to cough and notes it is a very productive cough. The new onset of cold symptoms prompted the daughter to bring the patient here to the ED for evaluation. There are no known ill contacts. Daughter notes he has a history of aspiration pneumonia, and she does mention that when feeding him lunch today, patient had a deep cough and daughter had to remove a piece of hardened gristle from his mouth. Though the patient is able to chew soft foods, he normally has his food pureed. Daughter says the kitchen staff is decent about his food preparation but she does occasionally find larger chunks of food in his pureed portions. The patient is still urinating regularly. Daughter notes the patient denies chest pain, abdominal pain, or new rashes when she communicates with him. She does note a healing sore on the heel of his left foot.     Allergies:  NKDA     Medications:    Dulcolax  Vitamin  D3  Vitamin B12  Duloxetine  Namenda  Calmoseptine  Phenylphrine-shark liever oil mineral oil-petrolatum   Psylllium   Quetiapine  Senna-docusate  Sodium chloride  Tamsulosin    Past Medical History:    Anxiety   Prostate cancer   Acute respiratory failure  Late onset Alzheimer's disease  Acute bacterial conjunctivitis of right eye  Infectious encephalopathy  Aspiration pneumonitis   Urinary frequency  HAP    Past Surgical History:    The patient does not have any pertinent past surgical history.    Family History:    No past pertinent family history.    Social History:  Negative for tobacco use.  Negative for alcohol use.  Negative for drug use.  Marital Status:       Review of Systems   All other systems reviewed and are negative.    Physical Exam     Patient Vitals for the past 24 hrs:   BP Temp Temp src Pulse Resp SpO2   05/19/19 1930 (!) 146/92 -- -- 86 -- 94 %   05/19/19 1915 (!) 157/119 -- -- -- -- 97 %   05/19/19 1910 -- -- -- 99 -- --   05/19/19 1822 -- 98.2  F (36.8  C) Oral -- -- --   05/19/19 1658 -- 97.9  F (36.6  C) Temporal -- -- --   05/19/19 1645 119/79 -- -- 85 -- 90 %   05/19/19 1642 119/79 -- -- 84 16 92 %     Physical Exam   General: Resting on the bed.  Head: No obvious trauma to head.  Ears, Nose, Throat:  External ears normal.  Nose normal.    Eyes:  Conjunctivae clear.  Pupils are equal, round, and reactive.   Neck: Normal range of motion.  Neck supple.   CV: Regular rate and rhythm.  No murmurs.      Respiratory: Effort normal and breath sounds coarse in the RLL.  No wheezing.    Gastrointestinal: Soft.  No distension. There is no tenderness.    Skin: Skin is warm and dry.  No rash noted.  Healing left posterior heel pressure wound, pink and soft.  Emergency Department Course   Imaging:  Radiographic findings were communicated with the patient who voiced understanding of the findings.    XR Chest 2 views:   1. Infiltrate at right lung base could indicate pneumonia. An  infiltrate  that was present here previously is partially resolved.  2. Shallow inspiration.  3. Interval clearing of left lower lobe infiltrate. As per radiology.     Laboratory:  Blood culture x 2: In process   Lactic acid whole blood: 1.2    CBC: WBC: 11.6 (H), HGB: 12.7 (L), PLT: 373  BMP: All WNL (Creatinine: 0.82)    UA with micro: Ordered but sample not provided in the ED  Urine culture: Ordered but sample not provided in the ED    Interventions:  180 NS 1L IV   Zosyn 4.5 g IV    Emergency Department Course:   Nursing notes and vitals reviewed. I performed an exam of the patient as documented above.     IV inserted. Medicine administered as documented above. Blood drawn. This was sent to the lab for further testing, results above. The patient provided a urine sample here in the emergency department. This was sent for laboratory testing, findings above.     The patient was sent for a chest xray while in the emergency department, findings above.      I rechecked the patient and discussed the results of his workup thus far.       I consulted with Dr. Pulido of the hospitalist services. They are in agreement to accept the patient for admission.    Findings and plan explained to the Patient and daughter who consents to admission. Discussed the patient with Dr. Pulido, who will admit the patient to a medical surgical bed for further monitoring, evaluation, and treatment.  Impression & Plan    Medical Decision Makin-year-old male with history of Alzheimer's disease presents with shortness of breath, cough, congestion.  Patient was mildly hypoxic 90% on room air but more comfortable on 2 L nasal cannula.  Vitals have been stable otherwise.  Afebrile.  Broad differential was pursued including but not limited to aspiration, pneumonia, electrolyte metabolic or renal dysfunction, dehydration, sepsis, bacteremia, hypoxia, PE, acute coronary syndrome, etc.  Overall patient is well-appearing nontoxic.  He is at  his mental status baseline per his daughter.  He does have significant Alzheimer's and only has clear movements intermittently.  He appears congested and gunky on examination.  Right greater than left.  CBC shows mild leukocytosis 11.6 and mild improved anemia.  Lactate is normal not concerning for severe sepsis or septic shock.  BMP shows no acute electrolyte metabolic or renal dysfunction.  Blood cultures are pending.  Low concern for PE without tachycardia.  I doubt ACS given his prior aspirations and low grade fever and congestion.  Chest x-ray shows infiltrate in the right lung base indicative of pneumonia.  Given that patient is an aspiration risk most concerned about possible aspiration therefore Zosyn was started.  Patient will be admitted to [facility] for further cares.  Discussed with hospitalist who graciously accepted.  Critical Care time:  none    Diagnosis:    ICD-10-CM    1. Meconium aspiration pneumonia of right lower lobe P24.01    2. Shortness of breath R06.02    3. Cough R05    4. Bandemia D72.825      Disposition:  Admitted to Dr. Ashok Pizano Disclosure:  I, Veena Vyas, am serving as a scribe on 5/19/2019 at 6:58 PM to personally document services performed by Amie Salinas MD based on my observations and the provider's statements to me.     Veena Vyas  5/19/2019   [facility] EMERGENCY DEPARTMENT       Amie Salinas MD  05/20/19 [id number]

## 2019-05-19 NOTE — LETTER
Key Recommendations:  EDSON AVERAGE. HIGH RISK.  Pt is admitted with aspiration PNA.  He is frail and has dementia.  He lives at [facility].  He also has [facility] RN support for wound care.      Recommendations are ***    Kamilla Guthrie    AVS/Discharge Summary is the source of truth; this is a helpful guide for improved communication of patient story

## 2019-05-20 NOTE — CONSULTS
Care Transition Initial Assessment -      Met with: Family    Active Problems:    Aspiration pneumonia (H)       DATA  Lives With: facility resident - [facility]      Quality of Family Relationships: involved, supportive, helpful  Description of Support System: Supportive, Involved  Who is your support system?: Wife, Children   . PT does live in [facility]. Family visit pt daily from 3 through dinner time.   Identified issues/concerns regarding health management: PT has H./O dementia being treated for UTI    @   Resources List: Assisted Living, [facility] P:[phone] fax [phone]  Annita Bertrand RN wound care support  [phone] fax [phone]  Quality of Family Relationships: involved, supportive, helpful    ASSESSMENT  Cognitive Status:  Pt has dementia. Spoke with daughter Lisa who has POA for   Concerns to be addressed: PT admitted for UTI from his [facility]. At baseline pt is now W/c bound at baseline, needs support for feeding and all cares. Family ar every supportive and plan to have pt return to his facility. Pt is now on EW through the [facility]. Pt's wife has Lewy Body dementia but lives at home with their daughter.    Will update [facility] about d.c needs or changes in care.  Pt will need W./C transport at time of discharge       PLAN  Following   [facility]  W/C transport  Need home care RN orders. Pt has wounds on his heals

## 2019-05-20 NOTE — CONSULTS
EDSON AVERAGE. HIGH RISK.  Pt is admitted with aspiration PNA.  SW leading.  He is frail and has dementia.  He lives at [facility].  He also has [facility] RN support for wound care.  Will give hand off to [facility].  Kate RUIZ CTS [phone]

## 2019-05-20 NOTE — H&P
[facility]  Hospitalist H&P    Name: Eder Pearson      MRN: [id number]  YOB: 1933    Age: 85 year old  Date of admission: 5/19/2019  Primary care provider: Janae Jauregui Physician            Assessment and Plan:   Eder Pearson is a 85 year old male with a history of advanced Alzheimer's dementia, recurrent aspiration pneumonia, dysphagia prostate cancer who presents with aspiration pneumonia.    1. Acute hypoxic respiratory failure secondary to aspiration pneumonia: Continue Unasyn, was given a dose of Zosyn in the emergency department.  Hemodynamic stable.  Monitor for fever and check procalcitonin.  If these are negative it might be reasonable to discontinue antibiotics and observe him as this could be more consistent with pneumonitis.  Place him n.p.o. overnight and request SLP evaluation in the morning to institute a safe diet.  Continue IV fluids in the meantime.  2. Advanced Alzheimer's dementia: Continue PTA Namenda, now off Seroquel.  Currently reasonably calm.  Resides at a memory care unit already.  3. Dysphagia: Eats pureed diet during day and daughter feeds him dinner in evening.    Code status: DNR/I, confirmed with daughter.  Admit to inpatient status.  Prophylaxis: PCD's.  Disposition: Back to memory care unit in 2 to 3 days.            Chief Complaint:   Shortness of breath.         History of Present Illness:   Eder Pearson is a 85 year old male who presents with shortness of breath.  History was obtained from my discussion with the daughter at the bedside.  Patient with dementia and unable to provide any meaningful history.  I also discussed the case with the ED provider.  The electronic medical record was also reviewed.    Patient resides in a memory care unit.  At baseline he is able to function reasonably but does have some memory deficits.  His daughter notes that over the past 24 hours he has had a more congested cough and been more short of  breath.  She went to visit him today and he was found laying in his wheelchair again with a congested cough and appearing short of breath.  She thought that he was having some respiratory distress and noted of a temperature of 100.3.  He will generally unwell so she brought him to the emergency department for evaluation.  Here he was noted to be mildly hypoxic.  Labs are largely unremarkable.  Chest x-ray shows a right lower lobe infiltrate concerning for pneumonia.  He was given a dose of Zosyn and will be admitted for further management.            Past Medical History:     Past Medical History:   Diagnosis Date     Anxiety      Dementia      Prostate cancer (H)              Past Surgical History:   No past surgical history on file.          Social History:     Social History     Tobacco Use     Smoking status: Never Smoker   Substance Use Topics     Alcohol use: No             Family History:   The family history was fully reviewed and non-contributory in this case.         Allergies:   No Known Allergies          Medications:     Prior to Admission medications    Medication Sig Last Dose Taking? Auth Provider   acetaminophen (TYLENOL) 500 MG tablet Take 1,000 mg by mouth 2 times daily , at 0700 & 1700   Unknown, Entered By History   acetaminophen (TYLENOL) 500 MG tablet Take 1,000 mg by mouth daily as needed for mild pain   Unknown, Entered By History   bisacodyl (DULCOLAX) 10 MG Suppository Place 10 mg rectally daily as needed for constipation   Reported, Patient   Cholecalciferol (VITAMIN D3 PO) Take 2,000 Units by mouth daily    Reported, Patient   Cyanocobalamin (VITAMIN B 12 PO) Take 1,000 mcg by mouth daily    Reported, Patient   DULoxetine (CYMBALTA) 30 MG EC capsule Take 30 mg by mouth daily   Unknown, Entered By History   Memantine HCl (NAMENDA PO) Take 10 mg by mouth daily   Reported, Patient   menthol-zinc oxide (CALMOSEPTINE) 0.44-20.625 % OINT ointment Apply topically every 8 hours as needed for  skin protection   Unknown, Entered By History   miconazole with skin protectant (LOPEZ ANTIFUNGAL) 2 % CREA cream Apply topically every 8 hours Apply to buttocks topically every shift for barrier cream with each toileting   Unknown, Entered By History   phenylephrine-shark liver oil-mineral oil-petrolatum (PREPARATION H) 0.25-14-74.9 % rectal ointment Place rectally daily as needed for hemorrhoids   Unknown, Entered By History   Psyllium (NATURAL FIBER PO) Take 1 tsp. by mouth daily   Unknown, Entered By History   QUEtiapine (SEROQUEL) 25 MG tablet Take 25 mg by mouth At Bedtime   Unknown, Entered By History   QUEtiapine (SEROQUEL) 25 MG tablet Take 12.5 mg by mouth every 12 hours as needed   Unknown, Entered By History   senna-docusate (SENOKOT-S;PERICOLACE) 8.6-50 MG per tablet Take 1 tablet by mouth every 12 hours as needed for constipation   Unknown, Entered By History   sodium chloride (OCEAN) 0.65 % nasal spray Spray 2 sprays into both nostrils 2 times daily , at 0900 & 2100   Unknown, Entered By History   sodium chloride (OCEAN) 0.65 % nasal spray Spray 2 sprays into both nostrils every 8 hours as needed for congestion   Unknown, Entered By History   tamsulosin (FLOMAX) 0.4 MG capsule Take 0.4 mg by mouth At Bedtime   Unknown, Entered By History             Review of Systems:   A Comprehensive greater than 10 system review of systems was carried out.  Pertinent positives and negatives are noted above.  Otherwise negative for contributory information.           Physical Exam:   Blood pressure (!) 146/92, pulse 86, temperature 98.2  F (36.8  C), temperature source Oral, resp. rate 16, SpO2 94 %.  Wt Readings from Last 1 Encounters:   05/05/17 86.2 kg (190 lb 1.6 oz)     Exam:  GENERAL: No apparent distress. Awake, alert, and somewhat oriented.  HEENT: Normocephalic, atraumatic. Extraocular movements intact.  CARDIOVASCULAR: Regular rate and rhythm without murmurs or rubs. No S3.  PULMONARY: [place]  bilaterally.  ABDOMINAL: Soft, non-tender, non-distended. Bowel sounds normoactive.   EXTREMITIES: No cyanosis or clubbing. No appreciable edema.  NEUROLOGICAL: CN 2-12 grossly intact, no focal neurological deficits.  DERMATOLOGICAL: No rash, ulcer, bruising, nor jaundice.          Data:   Laboratory:  Recent Labs   Lab 05/19/19  1810   WBC 11.6*   HGB 12.7*   HCT 38.8*   *        Recent Labs   Lab 05/19/19  1810      POTASSIUM 4.0   CHLORIDE 105   CO2 29   ANIONGAP 5   GLC 93   BUN 18   CR 0.82   GFRESTIMATED 80   GFRESTBLACK >90   ARANZA 8.9     Recent Labs   Lab 05/19/19  1810   LACT 1.2     No results for input(s): CULT in the last 168 hours.    Imaging:  Recent Results (from the past 24 hour(s))   XR Chest 1 View    Narrative    CHEST ONE VIEW  5/19/2019 7:01 PM     HISTORY: Cough, suspect aspiration pneumonia.    COMPARISON: 5/10/2017      Impression    IMPRESSION:  1. Infiltrate at right lung base could indicate pneumonia. An  infiltrate that was present here previously is partially resolved.  2. Shallow inspiration.  3. Interval clearing of left lower lobe infiltrate.    MD Leonardo DAMON DO MPH  [facility]  [address] [place] [address]  Pager: [phone]  05/19/2019

## 2019-05-20 NOTE — PLAN OF CARE
Pt admitted with daughter at bedside answering profile questions, pt non-verbal \ little to no response to questions per daughter, unable to answer any questions  overnight when prompted, without evidence of discomfort, cough/congestion noted infrequently while sleeping but still very sleepy \ lethargic with little alertness, NS 100ml/hr via PIV and IV unasyn Q6H, total care and incontinent, strict NPO until SLP consult, takes pills crushed in applesauce per daughter. From [place] with plans to return when discharge ready per daughter, stable on 2LPM via NC. Discharge TBD.

## 2019-05-20 NOTE — PLAN OF CARE
Patient remains hospitalized  for asp PNA. Requiring 1 L of O2 to keep sats > 90%.  Continues on IV abx. Tmax of 100 this shift. Tolerating full liquid diet with honey thick liquids, via spoon. Requiring help with feeding, per baseline. IVF continue. All other VSS. Turned and  repositioned Q2H, patient  incontinent of bowel and bladder. Patient occasionally responsive, though confused.

## 2019-05-20 NOTE — PROGRESS NOTES
CLINICAL SWALLOW EVALUATION       05/20/19 1150   General Information   Onset Date 05/19/19   Start of Care Date 05/20/19   Referring Physician Dr. Pulido   Patient Profile Review/OT: Additional Occupational Profile Info See Profile for full history and prior level of function   Patient/Family Goals Statement Patient unable to state goals. Family reports to MD that patient  eats pureed diet and requires 1:1 feeding assistance at his baseline.   Swallowing Evaluation Bedside swallow evaluation   Behaviorial Observations Alert;Lethargic   Mode of current nutrition NPO   Respiratory Status O2 Supply   Type of O2 supply Nasal cannula   Comments 1L O2   Clinical Swallow Evaluation   Oral Musculature unable to assess due to poor participation/comprehension   Oral Musculature Comments Right sided oral-labial leakage after oral cares.    Additional Documentation Yes   Additional evaluation(s) completed today No   Clinical Swallow Eval: Thin Liquid Texture Trial   Mode of Presentation, Thin Liquids spoon;fed by clinician   Volume of Liquid or Food Presented 3 ice chips   Oral Phase of Swallow WFL   Pharyngeal Phase of Swallow impaired;wet vocal quality after swallow   Diagnostic Statement Wet vocal quality, delayed swallow response.  Chewed ice   Clinical Swallow Eval: Nectar Thick Liquid Texture Trial   Mode of Presentation, Nectar spoon;fed by clinician   Volume of Nectar Presented 2 tsp   Oral Phase, [place] Poor AP movement   Pharyngeal Phase, Nectar impaired;wet vocal quality after swallow   Diagnostic Statement Suspect premature pharyngeal entry, pharyngeal stasis   Clinical Swallow Eval: Honey Thick Liquid Texture Trial   Mode of Presentation, Honey spoon;fed by clinician   Volume of Honey Presented 4 tsp   Oral Phase, Honey Poor AP movement   Pharyngeal Phase, Honey intact   Diagnostic Statement Delayed swallow, no overt Sx of aspiration   Clinical Swallow Eval: Puree Solid Texture Trial   Mode of Presentation, Puree  spoon;self-fed   Volume of Puree Presented 3 tsp   Oral Phase, Puree Poor AP movement   Pharyngeal Phase, Puree intact   Diagnostic Statement No overt Sx of aspiration, slow oral phase   Swallow Eval: Clinical Impressions   Skilled Criteria for Therapy Intervention Skilled criteria met.  Treatment indicated.   Functional Assessment Scale (FAS) 3   Dysphagia Outcome Severity Scale (KENNY) Level 3 - KENNY   Treatment Diagnosis Moderate oropharyngeal dysphagia   Diet texture recommendations Full liquid;Honey thick liquids  (By spoon)   Recommended Feeding/Eating Techniques maintain upright posture during/after eating for 30 mins;small sips/bites  (When alert only, 1:1 feeding)   Demonstrates Need for Referral to Another Service dietitian;social work   Therapy Frequency 5 times/wk   Predicted Duration of Therapy Intervention (days/wks) 1 week   Anticipated Discharge Disposition extended care facility   Risks and Benefits of Treatment have been explained. Yes   Patient, family and/or staff in agreement with Plan of Care Yes   Clinical Impression Comments Patient presents with moderate oropharyngeal dysphagia, suspected to be below his baseline and likely complicated by lethargy at this time.  Patient exhibited right sided oral drooling, difficulty clearing wet secretions prior to PO trials.  Patient tolerating small amounts of honey thick liquids by spoon and puree with 1:1 feeding assistance at this time. Recommend HONEY THICK full liquids when alert, upright, and 1:1 feeding with spoon.  Patient reported to take crushed meds in puree as his baseline.    Total Evaluation Time   Total Evaluation Time (Minutes) 25

## 2019-05-20 NOTE — ED NOTES
[facility]  ED Nurse Handoff Report    Eder Pearson is a 85 year old male   ED Chief complaint: Shortness of Breath  . ED Diagnosis:   Final diagnoses:   Meconium aspiration pneumonia of right lower lobe   Shortness of breath   Cough   Bandemia     Allergies: No Known Allergies    Code Status: Full Code  Activity level - Baseline/Home:  Total Care. Activity Level - Current:   Total Care. Lift room needed: No. Bariatric: No   Needed: No   Isolation: No. Infection: Not Applicable.     Vital Signs:   Vitals:    05/19/19 1822 05/19/19 1910 05/19/19 1915 05/19/19 1930   BP:   (!) 157/119 (!) 146/92   Pulse:  99  86   Resp:       Temp: 98.2  F (36.8  C)      TempSrc: Oral      SpO2:   97% 94%       Cardiac Rhythm:  ,      Pain level:    Patient confused: Yes. Patient Falls Risk: Yes.   Elimination Status: Has voided   Patient Report - Initial Complaint: hypoxia. Focused Assessment: Eder Pearson is a 85 year old male with Alzheimer's disease and prostate cancer who presents with his daughter at the ED for shortness of breath. To note, patient resides in an assisted-living facility. There is nursing staff present during the day but not for overnight coverage. Patient's baseline includes sometimes being cognizant of the present moments but he cannot remember events from his life, per daughter's report. For the past 24 hours, the patient has been significantly more congested and coughing. Today, when the patient's daughter went to visit her father, she found him laying down in his wheelchair extremely congested and with a phlegmy voice. The patient was also noted to be mouth breathing, which he normally does not do, as well as having his mouth hang wide open. She also noted the patient to be running a fever of 100.3 and complaining of a headache and general ill feelings. She has been able to encourage him to cough and notes it is a very productive cough. The new onset of cold symptoms prompted  the daughter to bring the patient here to the ED for evaluation. There are no known ill contacts. Daughter notes he has a history of aspiration pneumonia, and she does mention that when feeding him lunch today, patient had a deep cough and daughter had to remove a piece of hardened grisle from his mouth. Though the patient is able to chew soft foods, he normally has his food pureed. Daughter says the kitchen staff is decent about his food preparation but she does occasionally find larger chunks of food in his pureed portions. The patient is still urinating regularly. Daughter notes the patient denies chest pain, abdominal pain, or new rashes when she communicates with him. She does note a healing sore on the heel of his left foot.     Tests Performed:   Labs Ordered and Resulted from Time of ED Arrival Up to the Time of Departure from the ED   CBC WITH PLATELETS DIFFERENTIAL - Abnormal; Notable for the following components:       Result Value    WBC 11.6 (*)     RBC Count 3.78 (*)     Hemoglobin 12.7 (*)     Hematocrit 38.8 (*)      (*)     MCH 33.6 (*)     RDW 17.6 (*)     Absolute Neutrophil 9.4 (*)     All other components within normal limits   BASIC METABOLIC PANEL   LACTIC ACID WHOLE BLOOD   ROUTINE UA WITH MICROSCOPIC   BLOOD CULTURE   BLOOD CULTURE   URINE CULTURE AEROBIC BACTERIAL     XR Chest 1 View   Final Result   IMPRESSION:   1. Infiltrate at right lung base could indicate pneumonia. An   infiltrate that was present here previously is partially resolved.   2. Shallow inspiration.   3. Interval clearing of left lower lobe infiltrate.      MARCIA FOSTER MD           Abnormal Results: see above.   Treatments provided: see MAR  Family Comments: daughter present  OBS brochure/video discussed/provided to patient:  N/A  ED Medications:   Medications   0.9% sodium chloride BOLUS (0 mLs Intravenous Stopped 5/19/19 1932)     Followed by   sodium chloride 0.9% infusion (has no administration in time  range)   piperacillin-tazobactam (ZOSYN) intermittent infusion 4.5 g (4.5 g Intravenous New Bag 5/19/19 1937)     Drips infusing:  Yes-IV abx  For the majority of the shift, the patient's behavior Green. Interventions performed were none.     Severe Sepsis OR Septic Shock Diagnosis Present: No      ED Nurse Name/Phone Number: David Kwong,   7:49 PM    RECEIVING UNIT ED HANDOFF REVIEW    Above ED Nurse Handoff Report was reviewed: Yes  Reviewed by: Kika Jonas on May 19, 2019 at 8:19 PM

## 2019-05-20 NOTE — PHARMACY-ADMISSION MEDICATION HISTORY
Admission medication history interview status for this patient is complete. See [facility] admission navigator for allergy information, prior to admission medications and immunization status.     Medication history interview source(s):MAR  Medication history resources (including written lists, pill bottles, clinic record):MAR    Changes made to PTA medication list:  Added: franco baby shampoo external to eyes  Deleted: Seroquel daily  Changed: duloxetine from 30 mg to 20 mg, Vitamin B-12 from daily to M, W, F, tylenol from BID to TID.    Actions taken by pharmacist (provider contacted, etc):None     Additional medication history information:None    Medication reconciliation/reorder completed by provider prior to medication history? Yes    Do you take OTC medications (eg tylenol, ibuprofen, fish oil, eye/ear drops, etc)? Y(Y/N)    For patients on insulin therapy: N (Y/N)    Time spent in this activity: 15 min    Prior to Admission medications    Medication Sig Last Dose Taking? Auth Provider   acetaminophen (TYLENOL) 500 MG tablet Take 1,000 mg by mouth 3 times daily 0800, 1400, 2000 5/19/2019 at x2 Yes Unknown, Entered By History   bisacodyl (DULCOLAX) 10 MG Suppository Place 10 mg rectally daily as needed for constipation  at prn Yes Reported, Patient   Cholecalciferol (VITAMIN D3 PO) Take 2,000 Units by mouth daily  5/19/2019 at Unknown time Yes Reported, Patient   Cyanocobalamin (VITAMIN B 12 PO) Take 1,000 mcg by mouth Every Mon, Wed, Fri Morning  5/17/2019 Yes Reported, Patient   DULoxetine (CYMBALTA) 20 MG capsule Take 20 mg by mouth 2 times daily 5/19/2019 at am Yes Unknown, Entered By History   Infant Care Products (JOHNSONS BABY SHAMPOO EX) Externally apply topically daily Both eyes externally (warm wash cloth and wipe eyes) 5/19/2019 at Unknown time Yes Unknown, Entered By History   Infant Care Products (JOHNSONS BABY SHAMPOO EX) Apply externally to both eyes as needed for eye drainage.  at prn Yes Unknown,  Entered By History   Memantine HCl (NAMENDA PO) Take 10 mg by mouth daily 5/19/2019 at Unknown time Yes Reported, Patient   menthol-zinc oxide (CALMOSEPTINE) 0.44-20.625 % OINT ointment Apply topically every 8 hours as needed for skin protection  at prn Yes Unknown, Entered By History   miconazole with skin protectant (LOPEZ ANTIFUNGAL) 2 % CREA cream Apply topically every 8 hours Apply to buttocks topically every shift for barrier cream with each toileting 5/19/2019 at x1 Yes Unknown, Entered By History   phenylephrine-shark liver oil-mineral oil-petrolatum (PREPARATION H) 0.25-14-74.9 % rectal ointment Place rectally daily as needed for hemorrhoids  at prn Yes Unknown, Entered By History   Psyllium (NATURAL FIBER PO) Take 1 tsp. by mouth daily 5/19/2019 at Unknown time Yes Unknown, Entered By History   QUEtiapine (SEROQUEL) 25 MG tablet Take 12.5 mg by mouth every 12 hours as needed  at prn Yes Unknown, Entered By History   senna-docusate (SENOKOT-S;PERICOLACE) 8.6-50 MG per tablet Take 1 tablet by mouth every 12 hours as needed for constipation  at prn Yes Unknown, Entered By History   sodium chloride (OCEAN) 0.65 % nasal spray Spray 2 sprays into both nostrils 2 times daily , at 0900 & 2100 5/19/2019 at am Yes Unknown, Entered By History   sodium chloride (OCEAN) 0.65 % nasal spray Spray 2 sprays into both nostrils every 8 hours as needed for congestion  at prn Yes Unknown, Entered By History   tamsulosin (FLOMAX) 0.4 MG capsule Take 0.4 mg by mouth At Bedtime 5/18/2019 Yes Unknown, Entered By History

## 2019-05-20 NOTE — CONSULTS
"CLINICAL NUTRITION SERVICES  -  ASSESSMENT NOTE  Recommendations Ordered by Registered Dietitian (RD):   Ricardo CUADRA   Malnutrition:   % Weight Loss:  Weight loss does not meet criteria for malnutrition   % Intake:  Unable to fully assess  Subcutaneous Fat Loss:  Orbital region moderate-severe depletion and Upper arm region moderate depletion  Muscle Loss:  Temporal region moderate-severe depletion, Clavicle bone region moderate depletion and Dorsal hand region moderate depletion  Fluid Retention:  None noted    Malnutrition Diagnosis: Severe malnutrition  In Context of:  Chronic illness or disease      REASON FOR ASSESSMENT  Eder Pearson is a 85 year old male seen by Registered Dietitian for Admission Nutrition Risk Screen for stageable pressure injuries or large/non-healing wound or burn    NUTRITION HISTORY  - Information obtained from EMR.   - resides at [facility], his spouse lives at home with their daughter.   - PMH Alzheimer's, prostate cancer. Presented to ED with shortness of breath. Wounds on heels per EMR.  - Has dysphagia, and hx of aspiration pneumonia. Normally eats a pureed diet.   - Patient soundly sleeping during visit, no family available today - unable to complete full nutrition history.     CURRENT NUTRITION ORDERS  Diet Order:     Full liquid, Honey thick    Current Intake/Tolerance:  None recorded yet this admission.     NUTRITION FOCUSED PHYSICAL ASSESSMENT (NFPA)  Completed:  Yes Visual assessment only         Observed:    Muscle wasting (refer to documentation in Malnutrition section) and Subcutaneous fat loss (refer to documentation in Malnutrition section)    Obtained from Chart/Interdisciplinary Team:  BM x2 5/20  Frail  SLP 5/20 --> moderate oropharyngeal dysphagia, suspected below baseline.   Wounds on heals per EMR, ?PI, healing    ANTHROPOMETRICS  Height: 6' 0\"  Weight: 167 lbs 11.2 oz (76.1 kg)   Body mass index is 22.74 kg/m .  Weight Status:  Normal BMI  IBW: 80.9 kg "   % IBW: 94%  Weight History: 23# loss in the past 2 years (13.7%). Unable to determine true time frame.   Wt Readings from Last 10 Encounters:   05/19/19 76.1 kg (167 lb 11.2 oz)   05/05/17 86.2 kg (190 lb 1.6 oz)   01/20/17 86 kg (189 lb 8 oz)   01/27/15 108.9 kg (240 lb)     LABS  Labs reviewed  Recent Labs   Lab Test 05/20/19  0701 05/19/19  1810 05/12/17  0637 05/11/17  2036 05/11/17  0617   POTASSIUM 3.6 4.0 3.5 3.6 3.2*     No results for input(s): PHOS in the last 81560 hours.  Recent Labs   Lab Test 05/20/19  0701 05/12/17  0637 05/11/17  0617 05/10/17  0705 05/09/17  0710   MAG 1.7 1.8 1.9 1.8 2.0     Recent Labs   Lab Test 05/20/19  0701 05/19/19  1810 05/12/17  0637 05/11/17  0617 05/10/17  0705    139 139 142 145*     Recent Labs   Lab Test 05/20/19  0701 05/19/19  1810 05/12/17  0637 05/11/17  0617 05/10/17  0705   CR 0.77 0.82 0.98 0.92 0.83     Recent Labs   Lab 05/20/19  0701 05/19/19  1810   GLC 89 93     No results found for: A1C    MEDICATIONS  Medications reviewed  NaCl IVF @ 100 mL/hr     ASSESSED NUTRITION NEEDS PER APPROVED PRACTICE GUIDELINES:  Dosing Weight 76.1 kg   Estimated Energy Needs: 0450-6586 kcals (25-30 Kcal/Kg)  Justification: maintenance  Estimated Protein Needs: + grams protein (1.2-1.5+ g pro/Kg)  Justification: preservation of lean body mass and pending wound staging   Estimated Fluid Needs: >1 mL/kcal   Justification: maintenance    MALNUTRITION:  % Weight Loss:  Weight loss does not meet criteria for malnutrition   % Intake:  Unable to fully assess  Subcutaneous Fat Loss:  Orbital region moderate-severe depletion and Upper arm region moderate depletion  Muscle Loss:  Temporal region moderate-severe depletion, Clavicle bone region moderate depletion and Dorsal hand region moderate depletion  Fluid Retention:  None noted    Malnutrition Diagnosis: Severe malnutrition  In Context of:  Chronic illness or disease     NUTRITION DIAGNOSIS:  Malnutrition related to  suspected inadequate oral intakes at baseline 2/2 aging process, mentation, ?acute illness as evidenced by weight loss of up to 23# in 2 years or less, e/o fat and muscle wasting, no intake x1 day, coding for severe malnutrition based on NFPE.       NUTRITION INTERVENTIONS  Recommendations / Nutrition Prescription  Diet per SLP    Add magic shake BID w/ meals     Implementation  Nutrition education: Not appropriate at this time due to patient condition  Medical Food Supplement: see above  Collaboration and Referral of Nutrition care: pt discussed during interdisciplinary rounds.       Nutrition Goals  Patient to tolerate at least 50% meals TID or equivalent with supplements.       MONITORING AND EVALUATION:  Progress towards goals will be monitored and evaluated per protocol and Practice Guidelines    Chani Rosario RD, LD  3rd floor/ICU: [phone]  All other floors: [phone]  Weekend/holiday: [phone]  Office: [phone]

## 2019-05-20 NOTE — PROGRESS NOTES
[facility]  Hospitalist Progress Note for 5/20/2019:          Assessment and Plan:   Eder Pearson is a 85 year old male with a history of advanced Alzheimer's dementia, recurrent aspiration pneumonia, dysphagia prostate cancer who presents with aspiration pneumonia.      Acute hypoxic respiratory failure secondary to aspiration pneumonia:   - Hemodynamic stable.    - Continue Unasyn, was given a dose of Zosyn in the ER  - improved WBC nl Monitor for fever  -  procalcitonin < 0.05, so will discontinue antibiotics and monitor  - SLP consulted, recommended full liq honey thickened diet  - continue IV fluids for tonight     Advanced Alzheimer's dementia:   Continue PTA Namenda, now off Seroquel.  Currently reasonably calm.  Resides at a memory care unit already.     Dysphagia: Eats pureed diet during day and daughter feeds him dinner in evening.  - SLP following,recommended full liq honey thickened diet     Code status: DNR/DNI  Admit to inpatient status.  Prophylaxis: PCD's.  Disposition: Back to memory care unit in 2 to 3 days.unable to reach pt's daughter by phone Lisa [phone].               Interval History:   Not verbalizing, does not follow commands              Medications:       ampicillin-sulbactam (UNASYN) IV  3 g Intravenous Q6H     DULoxetine  20 mg Oral BID     memantine  10 mg Oral Daily     tamsulosin  0.4 mg Oral At Bedtime     acetaminophen, artificial saliva, bisacodyl, magnesium sulfate, melatonin, naloxone, ondansetron **OR** ondansetron, polyethylene glycol, potassium chloride, potassium chloride with lidocaine, potassium chloride, potassium chloride, potassium chloride, prochlorperazine **OR** prochlorperazine **OR** prochlorperazine, senna-docusate **OR** senna-docusate               Physical Exam:   Blood pressure 146/66, pulse 66, temperature 98.3  F (36.8  C), temperature source Axillary, resp. rate 20, height 1.829 m (6'), weight 76.1 kg (167 lb 11.2 oz), SpO2 92  %.  Wt Readings from Last 4 Encounters:   19 76.1 kg (167 lb 11.2 oz)   17 86.2 kg (190 lb 1.6 oz)   17 86 kg (189 lb 8 oz)   01/27/15 108.9 kg (240 lb)         Vital Sign Ranges  Temperature Temp  Av.8  F (37.1  C)  Min: 97.9  F (36.6  C)  Max: 100  F (37.8  C)   Blood pressure Systolic (24hrs), Av , Min:119 , Max:157        Diastolic (24hrs), Av, Min:66, Max:119      Pulse Pulse  Av.8  Min: 66  Max: 99   Respirations Resp  Av.3  Min: 16  Max: 20   Pulse oximetry SpO2  Av.3 %  Min: 87 %  Max: 97 %       No intake or output data in the 24 hours ending 19 1047    Constitutional: Lethargic, no apparent distress but with audible gurgling sounds.   Lungs: Diffuse crackles , no wheezing   Cardiovascular: Regular rate and rhythm, normal S1 and S2, and no murmur noted   Abdomen: Normal bowel sounds, soft, non-distended, non-tender   Skin: No rashes, no cyanosis, no edema               Data:   All laboratory data reviewed

## 2019-05-20 NOTE — PLAN OF CARE
Discharge Planner SLP   Patient plan for discharge: custodial  Current status: Clinical swallow evaluation completed.  Patient presents with moderate oropharyngeal dysphagia, suspected to be below his baseline and likely complicated by lethargy at this time.  Patient exhibited right sided oral drooling, difficulty clearing wet secretions prior to PO trials.  Patient tolerating small amounts of honey thick liquids by spoon and puree with 1:1 feeding assistance at this time. Recommend HONEY THICK full liquids when alert, upright, and 1:1 feeding with spoon.  Patient reported to take crushed meds in puree as his baseline.   Barriers to return to prior living situation: Weakness  Recommendations for discharge: JUAN with SLP services  Rationale for recommendations: Recommend continue SLP services at discharge to ensure safe return to least restrictive diet, as patient is below reported baseline diet of pureed diet.        Entered by: Jackie Fernandez 05/20/2019 11:45 AM

## 2019-05-21 NOTE — PLAN OF CARE
Pt admitted with pneumonia.  VSS, continues on 1 L oxygen.  Pt lethargic, groans and garbled speech. Repositioned Q2H, incontinent of bowel and bladder.  Tolerating a full liquid diet, needs to be fed. Sween cream applied to heals, redness blanchable.  Scrotum and penis red, barrier cream applied. IVF infusing.

## 2019-05-21 NOTE — PLAN OF CARE
VSS, on 1L O2 with sats 90%.  Continues to be lethergic, arousable to touch.  Lungs dim.  Infrequent congest cough.  IV unasyn continues.  Turned and repositioned q2-3 hours.  Incontinent of urine.  Speech and SW following.

## 2019-05-21 NOTE — PLAN OF CARE
Patient hospitalized for possible pneumonia on 5/19. IV unasyn.    Pertinent assessments: Patient lethargic, unable to assess orientation. Lung sounds diminished, non productive cough. Bowel sounds active and audible, incontinent of bowel and bladder. Full liquid diet, honey thick, and spoon fed. BP elevated 180/71, this is an improvement from earlier. All other VSS. Mechanical lift assistance. Q2 hour repositioning.  Discharge/Transfer Needs: TBD. Possible discharge to a LTC.

## 2019-05-21 NOTE — PROGRESS NOTES
[facility]  Hospitalist Progress Note for 5/21/2019:          Assessment and Plan:   Eder Pearson is a 85 year old male with a history of advanced Alzheimer's dementia, recurrent aspiration pneumonia, dysphagia prostate cancer who presents with aspiration pneumonia.      Acute hypoxic respiratory failure secondary to aspiration pneumonia:    Aspirations risk:  Presented with fever 100 , cough/congestion.On admission WBC 11.6,  CXR showed an Infiltrate at right lung base could indicate pneumonia. He was started on  - initially on Unasyn after a dose of Zosyn in the ER  - procalcitonin < 0.05, afebrile x past 24 hrs & WBC nl   - so Unasyn stopped 5/21 and monitor on IVF alone till he is more awake  - SLP following, recommended full liq honey thickened diet     Metabolic encephalopathy;   Advanced Alzheimer's dementia:    continues to be lethargic but calm most of the day time but able to be fed by daughter last evening. Has not received any sedating medications.  Continued PTA Namenda, now off Seroquel  - continue off AB , on IVF &      Dysphagia:   E\PTA pt was on pureed diet during day and daughter fed him dinner in evening.  - SLP following,recommended full liq honey thickened diet which he did not like. Last evening pt's daughter ordered his meal & he ate v well per staff. This morning pt again lethargic & waiting for SLP eval.     Code status: DNR/DNI  Admit to inpatient status.  Prophylaxis: PCD's.  Disposition: plan for discharge in AM off AB & on IVF.   Discussed with Pt's daughter- who is planning on changing her father to go to a [facility] facility (if a memory care bed  available) where her mother is going if they accept him or pt will go back to previous  [facility]. SW following.    Dunia Collier MD.  Hospitalist [phone] (7am -6 pm)               Interval History:   Not verbalizing, does not follow commands              Medications:       DULoxetine  20 mg Oral BID      memantine  10 mg Oral Daily     tamsulosin  0.4 mg Oral At Bedtime     acetaminophen, artificial saliva, bisacodyl, hypromellose-dextran, magnesium sulfate, melatonin, naloxone, ondansetron **OR** ondansetron, polyethylene glycol, potassium chloride, potassium chloride with lidocaine, potassium chloride, potassium chloride, potassium chloride, prochlorperazine **OR** prochlorperazine **OR** prochlorperazine, senna-docusate **OR** senna-docusate               Physical Exam:   Blood pressure 154/66, pulse 66, temperature 99.1  F (37.3  C), temperature source Axillary, resp. rate 16, height 1.829 m (6'), weight 76.1 kg (167 lb 12.8 oz), SpO2 93 %.  Wt Readings from Last 4 Encounters:   19 76.1 kg (167 lb 12.8 oz)   17 86.2 kg (190 lb 1.6 oz)   17 86 kg (189 lb 8 oz)   01/27/15 108.9 kg (240 lb)         Vital Sign Ranges  Temperature Temp  Av.8  F (37.1  C)  Min: 97.9  F (36.6  C)  Max: 100  F (37.8  C)   Blood pressure Systolic (24hrs), Av , Min:119 , Max:157        Diastolic (24hrs), Av, Min:66, Max:119      Pulse Pulse  Av.8  Min: 66  Max: 99   Respirations Resp  Av.3  Min: 16  Max: 20   Pulse oximetry SpO2  Av.3 %  Min: 87 %  Max: 97 %       No intake or output data in the 24 hours ending 19 1047    Constitutional: Lethargic, no apparent distress    Lungs: Improved crackles , no wheezing   Cardiovascular: Regular rate and rhythm, normal S1 and S2, and no murmur noted   Abdomen: Normal bowel sounds, soft, non-distended, non-tender   Skin: No rashes, no cyanosis, no edema               Data:   All laboratory data reviewed

## 2019-05-21 NOTE — PROGRESS NOTES
Discharge Planner   Discharge Plans in progress: Pt has been accepted into LTC [facility] at [facility] for tomorrow.   Barriers to discharge plan: NOne anticipated. LTC facility would like orders for Speech and PT due to new diet, possible APNA and PT consult to assess functional status.  Follow up plan: Will update daughter of plan and location        Entered by: Corinne C. White 05/21/2019 3:04 PM

## 2019-05-22 NOTE — PLAN OF CARE
Patient's After Visit Summary was reviewed with patient.  Patient verbalized understanding of After Visit Summary, recommended follow up and was given an opportunity to ask questions.   Discharge medications sent home with patient/family: Not applicable, Pt discharging to LTC facility. Packet sent with HE transport tech  Discharged with transport tech    PIV removed. Site WDL.

## 2019-05-22 NOTE — PROGRESS NOTES
Your information has been submitted on May 22nd, 2019 at 08:12:56 AM CDT. The confirmation number is [id number]

## 2019-05-22 NOTE — PLAN OF CARE
Speech Language Therapy Discharge Summary    Reason for therapy discharge:    Discharged to long term care facility.    Progress towards therapy goal(s). See goals on Care Plan in Epic electronic health record for goal details.  Goals not met.  Barriers to achieving goals:   discharge from facility.    Therapy recommendation(s):    Continued therapy is recommended.  Rationale/Recommendations: Continue SLP services at discharge to ensure safe return to least restrictive diet, as patient is below reported baseline diet of pureed diet.     Most recent recommendations during IP stay: HONEY THICK full liquids when alert, upright, and 1:1 feeding with spoon.  Patient reported to take crushed meds in puree as his baseline.

## 2019-05-22 NOTE — PLAN OF CARE
Ambulatory Status:  Pt up A2 with lift.  VS:  stable; afebrile.  Pain:  denies.  Resp: LS diminished on 1L O2 NC.  GI:  denies nausea.  good appetite and on full liquid/honey thick diet.  BS hypoactive.  Passing flatus.  Last BM 5/21.  :  incont.  Skin:  red scrotum; applied barrier cream.  Consults:  speech  Disposition:  discharging on 5/22 .

## 2019-05-22 NOTE — DISCHARGE SUMMARY
[facility]  Hospitalist Discharge Summary       Date of Admission:  5/19/2019  Date of Discharge:  5/22/2019  Discharging Provider: Juliette Ballard MD      Discharge Diagnoses   Acute hypoxic respiratory failure secondary to aspiration pneumonia:   Metabolic encephalopathy  Advanced Alzheimer's  dementia  dysphagia     Follow-ups Needed After Discharge   Follow-up Appointments     Follow Up and recommended labs and tests      Follow up with Nursing home physician.  No follow up labs or test are   needed.             Unresulted Labs Ordered in the Past 30 Days of this Admission     Date and Time Order Name Status Description    5/19/2019 1747 Blood culture Preliminary     5/19/2019 1710 Blood culture Preliminary           Discharge Disposition   Discharged to long-term care facility  Condition at discharge: Stable    Hospital Course   Eder Pearson is a 85 year old male with a history of advanced Alzheimer's dementia, recurrent aspiration pneumonia, dysphagia prostate cancer who presents with aspiration pneumonia.     Acute hypoxic respiratory failure secondary to aspiration pneumonia:   Presented with fever 100 , cough/congestion. On admission WBC 11.6,  CXR showed an Infiltrate at right lung base could indicate pneumonia. He was started on Unasyn after a dose of Zosyn in the ER. Procalcitonin < 0.05, afebrile x past 24 hrs & WBC normalized. He however continues to need oxygen.    - will discharge on 5 more days of Augmentin  - SLP following, recommended full liq honey thickened diet. SLP referral made for LTC to see if diet can be advanced as tolerated     Metabolic encephalopathy  Advanced Alzheimer's dementia   He is alert and sitting up in his bed. He eats when he is fed.  His PTA Seroquel has been held in hospital and will discontinue at discharge.   - Continued PTA Namenda and Cymbalta     Dysphagia:   PTA pt was on pureed diet during day and daughter fed him dinner in evening.  - SLP  following,recommended full liq honey thickened diet. Will continue same at discharge and SLP referral made.        Consultations This Hospital Stay   SOCIAL WORK IP CONSULT  SPEECH LANGUAGE PATH ADULT IP CONSULT  CARE COORDINATOR IP CONSULT  SPEECH LANGUAGE PATH ADULT IP CONSULT    Code Status   DNR/DNI    Time Spent on this Encounter   I, Juliette Ballard, personally saw the patient today and spent 35 minutes discharging this patient.       Juliette Ballard MD  [facility]  ______________________________________________________________________    Physical Exam   Vital Signs: Temp: 96.9  F (36.1  C) Temp src: Axillary BP: 138/48   Heart Rate: 56 Resp: 20 SpO2: 94 % O2 Device: Nasal cannula Oxygen Delivery: 1 LPM  Weight: 172 lbs 1.6 oz  General Appearance: Alert, awake, no apparent distress  Respiratory: shallow breathing with diminished BS at the bases, otherwise CTA bilaterally  Cardiovascular: regular rate and rhythm  GI: soft and non-tender  Skin: warm and dry         Primary Care Physician   [facility]    Discharge Orders      General info for SNF    Length of Stay Estimate: Long Term Care  Condition at Discharge: Stable  Level of care:skilled   Rehabilitation Potential: Poor  Admission H&P remains valid and up-to-date: Yes  Recent Chemotherapy: N/A  Use Nursing Home Standing Orders: Yes     Mantoux instructions    Give two-step Mantoux (PPD) Per Facility Policy Yes     Follow Up and recommended labs and tests    Follow up with Nursing home physician.  No follow up labs or test are needed.     Activity - Up with assistive device     Activity - Up with nursing assistance     DNR/DNI     Speech Language Path Adult Consult    Evaluate and treat as clinically indicated.    Reason:  dysphagia     Oxygen - Nasal cannula    1-2 Lpm by nasal cannula to keep O2 sats 90% or greater.     Fall precautions     Advance Diet as Tolerated    Follow this diet upon discharge: Orders Placed  This Encounter      Snacks/Supplements Adult: Other; Magic Shake; With Meals      Combination Diet Full Liquid; Honey Thickened Liquids (pre-thickened or use instant food thickener) (By spoon)       Significant Results and Procedures   Most Recent 3 CBC's:  Recent Labs   Lab Test 05/20/19  0701 05/19/19  1810 05/12/17  0637   WBC 9.8 11.6* 9.4   HGB 10.6* 12.7* 8.5*   * 103* 98    373 375     Most Recent 3 BMP's:  Recent Labs   Lab Test 05/20/19  0701 05/19/19  1810 05/12/17  0637    139 139   POTASSIUM 3.6 4.0 3.5   CHLORIDE 108 105 105   CO2 28 29 28   BUN 16 18 13   CR 0.77 0.82 0.98   ANIONGAP 5 5 6   ARANZA 8.0* 8.9 8.6   GLC 89 93 96   ,   Results for orders placed or performed during the hospital encounter of 05/19/19   XR Chest 1 View    Narrative    CHEST ONE VIEW  5/19/2019 7:01 PM     HISTORY: Cough, suspect aspiration pneumonia.    COMPARISON: 5/10/2017      Impression    IMPRESSION:  1. Infiltrate at right lung base could indicate pneumonia. An  infiltrate that was present here previously is partially resolved.  2. Shallow inspiration.  3. Interval clearing of left lower lobe infiltrate.    MARCIA FOSTER MD       Discharge Medications   Current Discharge Medication List      START taking these medications    Details   amoxicillin-clavulanate (AUGMENTIN) 400-57 MG/5ML suspension Take 6.3 mLs (500 mg) by mouth every 8 hours for 5 days    Associated Diagnoses: Aspiration pneumonia, unspecified aspiration pneumonia type, unspecified laterality, unspecified part of lung (H)         CONTINUE these medications which have NOT CHANGED    Details   acetaminophen (TYLENOL) 500 MG tablet Take 1,000 mg by mouth 3 times daily 0800, 1400, 2000      bisacodyl (DULCOLAX) 10 MG Suppository Place 10 mg rectally daily as needed for constipation      Cholecalciferol (VITAMIN D3 PO) Take 2,000 Units by mouth daily       Cyanocobalamin (VITAMIN B 12 PO) Take 1,000 mcg by mouth Every Mon, Wed, Fri Morning        DULoxetine (CYMBALTA) 20 MG capsule Take 20 mg by mouth 2 times daily      !! Infant Care Products (JOHNSONS BABY SHAMPOO EX) Externally apply topically daily Both eyes externally (warm wash cloth and wipe eyes)      !! Infant Care Products (JOHNSONS BABY SHAMPOO EX) Apply externally to both eyes as needed for eye drainage.      Memantine HCl (NAMENDA PO) Take 10 mg by mouth daily      menthol-zinc oxide (CALMOSEPTINE) 0.44-20.625 % OINT ointment Apply topically every 8 hours as needed for skin protection      miconazole with skin protectant (LOPEZ ANTIFUNGAL) 2 % CREA cream Apply topically every 8 hours Apply to buttocks topically every shift for barrier cream with each toileting      phenylephrine-shark liver oil-mineral oil-petrolatum (PREPARATION H) 0.25-14-74.9 % rectal ointment Place rectally daily as needed for hemorrhoids      Psyllium (NATURAL FIBER PO) Take 1 tsp. by mouth daily      senna-docusate (SENOKOT-S;PERICOLACE) 8.6-50 MG per tablet Take 1 tablet by mouth every 12 hours as needed for constipation      !! sodium chloride (OCEAN) 0.65 % nasal spray Spray 2 sprays into both nostrils 2 times daily , at 0900 & 2100      !! sodium chloride (OCEAN) 0.65 % nasal spray Spray 2 sprays into both nostrils every 8 hours as needed for congestion      tamsulosin (FLOMAX) 0.4 MG capsule Take 0.4 mg by mouth At Bedtime       !! - Potential duplicate medications found. Please discuss with provider.      STOP taking these medications       QUEtiapine (SEROQUEL) 25 MG tablet Comments:   Reason for Stopping:             Allergies   No Known Allergies

## 2019-05-22 NOTE — PROGRESS NOTES
Discharge Planner   Discharge Plans in progress: PT has LTC bed at [facility] for today.   Barriers to discharge plan: none  Follow up plan: H/E transport set for 1315     05/21/19 1500   Final Resources   Skilled Nursing Facility [facility] [phone], Fax: [phone]          Entered by: Corinne C. White 05/22/2019 8:08 AM

## 2019-05-22 NOTE — PLAN OF CARE
Neuro: Lethargic. Unable to respond to orientated questions.  VS:  VSS. Able to ween off supplemental oxygen this AM. Remains in mid to low 90's room air.   Respiratory: LS diminished.   GI / : Incontinent of bowel and bladder.   Skin: Pink, excoriated area to scrotum. See flow sheets.   Pain: denies. No objective or subjective sighs or symptoms of pain noted.   IV: PIV WDL SL  Transfer: A2 lift. Turned and repositioned every 2 hours.   Diet: Full liquids, honey thick.   Plan: TBD. Hospitalist, [facility] following. Possible discharge to LTC facility later today.

## 2019-05-22 NOTE — PLAN OF CARE
1L O2 continues, other VSS.  Lethargic most of shift.  Periods of alertness.  Incontinent of urine and smears of stool.  Turned and repositioned.  Speech and SW following.  Probable discharge to facility today.

## 2019-05-23 NOTE — PROGRESS NOTES
Transition Communication Hand-off for Care Transitions to Next Level of Care Provider    Name: Eder Pearson  : 1933  MRN #: [id number]  Primary Care Provider: [facility]  Primary Care MD Name: Sanjeev dejesus  Primary Clinic: [address] [place] [address]  [facility]: [facility]  Reason for Hospitalization:  Cough [R05]  Shortness of breath [R06.02]  Bandemia [D72.825]  Pneumonia of right lower lobe due to infectious organism (H) [J18.1]  Meconium aspiration pneumonia of right lower lobe [P24.01]  Admit Date/Time: 2019  4:41 PM  Discharge Date: 19  Payor Source: Payor: MEDICARE / Plan: MEDICARE / Product Type: Medicare /     Readmission Assessment Measure (EDSON) Risk Score/category: AVERAGE           Reason for Communication Hand-off Referral: Fragility  Difficulty understanding plan of care    Discharge Plan:       Concern for non-adherence with plan of care:   NO  Discharge Needs Assessment:  Needs      Most Recent Value   Anticipated Changes Related to Illness  none   Equipment Currently Used at Home  lift device, wheelchair, manual   # of Referrals Placed by [facility]  External Care Coordination   Assisted Living  -- [[facility] assisted living [phone]]   Home Care  -- [[facility] [phone] ax [phone]]   Skilled Nursing Facility  [facility] [phone], Fax: [phone]   PAS Number  [id number]          Already enrolled in Tele-monitoring program and name of program:  na  Follow-up specialty is recommended: No    Follow-up plan:  No future appointments.    Any outstanding tests or procedures:    Procedures     Future Labs/Procedures    Oxygen - Nasal cannula     Comments:    1-2 Lpm by nasal cannula to keep O2 sats 90% or greater.          Referrals     Future Labs/Procedures    Physical Therapy Adult Consult     Comments:    Evaluate and treat as clinically indicated.    Reason:   Deconditioning/generalized weakness    Speech Language Path Adult Consult     Comments:    Evaluate and treat as clinically indicated.    Reason:  dysphagia        .            Snacks/Supplements Adult: Other; Magic Shake; With Meals      Combination Diet Full Liquid; Honey Thickened Liquids (pre-thickened or use instant food thickener) (By spoon      Key Recommendations:  EDSON AVERAGE. HIGH RISK.  Pt is admitted with aspiration PNA.  He is frail and has dementia.  He lives at [facility].  He also has [facility] RN support for wound care.  Dtg has decided that she wants him at [facility] as she didn't care for the care at [facility].      Recommendations are to /follow-up with nursing home physician.     Kamilla Guthrie    AVS/Discharge Summary is the source of truth; this is a helpful guide for improved communication of patient story.

## 2019-05-24 PROBLEM — Z71.89 ACP (ADVANCE CARE PLANNING): Chronic | Status: RESOLVED | Noted: 2017-02-07 | Resolved: 2019-01-01

## 2019-05-29 NOTE — LETTER
"    5/29/2019        RE: Eder Pearson  [address] [place] [address]        [facility]  PRIMARY CARE PROVIDER AND CLINIC:  [facility], [address] [place] [id number]  Chief Complaint   Patient presents with     Establish Care     [facility] Record Number:  [id number]  Place of Service where encounter took place:  [facility] ([facility]) [[id number]]    Eder Pearson  is a 85 year old  (7/9/1933), admitted to the above facility from  [facility]. Hospital stay 5/19/19 through 5/22/19..  Admitted to this facility for  rehab, medical management and nursing care.    HPI:    HPI information obtained from: facility chart records, facility staff, patient report and [facility] chart review.   Brief Summary of Hospital Course: Patient was hospitalized 5/5-5/13 for acute respiratory failure with hypoxia, treated for aspiration pneumonia and incidentally right eye conjunctivitis. Discharged on soft mechanical with nectar-thickened liquids however was readmitted 2 days later (5/15) for acute respiratory failure with hypoxia, treated for aspiration pneumonia with IV antibiotics- discharged on PO Augmentin & full liq honey thickened diet. His PTA Seroquel was held during hospitalization & discontinued at discharge d/t metabolic encephalopathy.   Updates on Status Since Skilled nursing Admission: No behavioral concerns since transferring to secure memory care unit at [facility]. Staff reports patient sleeps \"80% of the time\". Patient appears comfortable during exam, nonverbal. Requires osvaldo lift for transfers, utilizing Juditta chair. Good appetite, total assist with ADLs & eating.   -155 with HR 55-78.      CODE STATUS/ADVANCE DIRECTIVES DISCUSSION:   DNR / DNI  Patient's living condition: lives in a skilled nursing facility  ALLERGIES: Patient has no known allergies.  PAST MEDICAL HISTORY:  has a past medical history of Anxiety, " Dementia, and Prostate cancer (H).  PAST SURGICAL HISTORY:   has no past surgical history on file.  FAMILY HISTORY: family history is not on file.  SOCIAL HISTORY:   reports that he has never smoked. He does not have any smokeless tobacco history on file. He reports that he does not drink alcohol.    Post Discharge Medication Reconciliation Status: discharge medications reconciled, continue medications without change    Current Outpatient Medications   Medication Sig Dispense Refill     acetaminophen (TYLENOL) 500 MG tablet Take 1,000 mg by mouth 3 times daily 0800, 1400, 2000 AND 1000 mg daily as needed       bisacodyl (DULCOLAX) 10 MG Suppository Place 10 mg rectally daily as needed for constipation       Cholecalciferol (VITAMIN D3 PO) Take 2,000 Units by mouth daily        Cyanocobalamin (VITAMIN B 12 PO) Take 1,000 mcg by mouth Every Mon, Wed, Fri Morning        DULoxetine (CYMBALTA) 20 MG capsule Take 20 mg by mouth 2 times daily       Infant Care Products (Olacabs BABY SHAMPOO EX) Externally apply topically daily AND Apply to both eyes topically one time a day for eye drainage Use warm wash cloth and wipe eyes       Infant Care Products (Olacabs BABY SHAMPOO EX) Apply externally to both eyes as needed for eye drainage.       Memantine HCl (NAMENDA PO) Take 10 mg by mouth daily       menthol-zinc oxide (CALMOSEPTINE) 0.44-20.625 % OINT ointment Apply topically every 8 hours as needed for skin protection       miconazole with skin protectant (LOPEZ ANTIFUNGAL) 2 % CREA cream Apply topically every 8 hours Apply to buttocks topically every shift for barrier cream with each toileting       phenylephrine-shark liver oil-mineral oil-petrolatum (PREPARATION H) 0.25-14-74.9 % rectal ointment Place rectally daily as needed for hemorrhoids       senna-docusate (SENOKOT-S;PERICOLACE) 8.6-50 MG per tablet Take 1 tablet by mouth every 12 hours as needed for constipation       sodium chloride (OCEAN) 0.65 % nasal spray  Spray 2 sprays into both nostrils 2 times daily , at 0900 & 2100       sodium chloride (OCEAN) 0.65 % nasal spray Spray 2 sprays into both nostrils every 8 hours as needed for congestion       tamsulosin (FLOMAX) 0.4 MG capsule Take 0.4 mg by mouth At Bedtime       Psyllium (NATURAL FIBER PO) Take 1 tsp. by mouth daily       ROS:  Unobtainable secondar tyo cognitive impairment.     Vitals:  /63   Pulse 55   Temp 96.8  F (36  C)   Resp 20   Wt 74.3 kg (163 lb 14.4 oz)   SpO2 95%   BMI 22.23 kg/m     Exam:  GENERAL APPEARANCE:  in no distress, nonverbal, minimal verbal response  ENT:  Mouth and posterior oropharynx normal, moist mucous membranes  EYES:  lids normal, eyes closed  RESP:  lungs clear to auscultation , no respiratory distress  CV:  reg rhythm, bradycardic today at 55  ABDOMEN:  no guarding or rebound, bowel sounds normal, no distension  M/S:   decreased muscle tone, no edema  PSYCH:  unable to assess    Lab/Diagnostic data:  Recent labs in [facility] reviewed by me today.     ASSESSMENT/PLAN:  (J69.0) Aspiration pneumonia, unspecified aspiration pneumonia type, unspecified laterality, unspecified part of lung (H)  (primary encounter diagnosis)  (R09.02) Hypoxia  (R13.10) Dysphagia, unspecified type  Comment: back-to-back hospitalizations for acute respiratory failure 2/2 aspiration pneumonia, completed IV antibiotic in hospital & PO Augmentin, remains on 2LMP NC   Plan:   --continue speech therapy   --upgraded to pureed texture, moderately thick consistency   --monitor O2, continue 2LPM, wean as able    (G30.1,  F02.81) Late onset Alzheimer's disease with behavioral disturbance  Comment: Seroquel discontinued d/t encephalopathy   Plan:   --monitor for increase in behaviors or change in sleep & eating patterns  --continues to be appropriate for secure Memory Care Unit  --continue Namenda, B12 & duloxetine     (R35.0) Urinary frequency  Comment: unclear history of prostate cancer  Plan:   --continue  Flomax 0.4mg daily    Orders written by provider at facility  1. No new orders today, continue same      Total time spent with patient visit at the skilled nursing facility was 39 minutes including including patient visit, review of past records & discussions about the POC. Greater than 50% of total time spent time on patient's counseling and coordinating care regarding conditions and treatment options as mentioned above.    Electronically signed by:  Penelope Seo NP                         Sincerely,        Penelope Seo NP

## 2019-05-29 NOTE — PROGRESS NOTES
"[facility]  PRIMARY CARE PROVIDER AND CLINIC:  [facility], [address] [place] [id number]  Chief Complaint   Patient presents with     Establish Care     [facility] Record Number:  [id number]  Place of Service where encounter took place:  [facility] ([facility]) [[id number]]    Eder Pearson  is a 85 year old  (7/9/1933), admitted to the above facility from  [facility]. Hospital stay 5/19/19 through 5/22/19..  Admitted to this facility for  rehab, medical management and nursing care.    HPI:    HPI information obtained from: facility chart records, facility staff, patient report and [facility] chart review.   Brief Summary of Hospital Course: Patient was hospitalized 5/5-5/13 for acute respiratory failure with hypoxia, treated for aspiration pneumonia and incidentally right eye conjunctivitis. Discharged on soft mechanical with nectar-thickened liquids however was readmitted 2 days later (5/15) for acute respiratory failure with hypoxia, treated for aspiration pneumonia with IV antibiotics- discharged on PO Augmentin & full liq honey thickened diet. His PTA Seroquel was held during hospitalization & discontinued at discharge d/t metabolic encephalopathy.   Updates on Status Since Skilled nursing Admission: No behavioral concerns since transferring to secure memory care unit at [facility]. Staff reports patient sleeps \"80% of the time\". Patient appears comfortable during exam, nonverbal. Requires osvaldo lift for transfers, utilizing Juditta chair. Good appetite, total assist with ADLs & eating.   -155 with HR 55-78.      CODE STATUS/ADVANCE DIRECTIVES DISCUSSION:   DNR / DNI  Patient's living condition: lives in a skilled nursing facility  ALLERGIES: Patient has no known allergies.  PAST MEDICAL HISTORY:  has a past medical history of Anxiety, Dementia, and Prostate cancer (H).  PAST SURGICAL HISTORY:   has no past surgical history on " file.  FAMILY HISTORY: family history is not on file.  SOCIAL HISTORY:   reports that he has never smoked. He does not have any smokeless tobacco history on file. He reports that he does not drink alcohol.    Post Discharge Medication Reconciliation Status: discharge medications reconciled, continue medications without change    Current Outpatient Medications   Medication Sig Dispense Refill     acetaminophen (TYLENOL) 500 MG tablet Take 1,000 mg by mouth 3 times daily 0800, 1400, 2000 AND 1000 mg daily as needed       bisacodyl (DULCOLAX) 10 MG Suppository Place 10 mg rectally daily as needed for constipation       Cholecalciferol (VITAMIN D3 PO) Take 2,000 Units by mouth daily        Cyanocobalamin (VITAMIN B 12 PO) Take 1,000 mcg by mouth Every Mon, Wed, Fri Morning        DULoxetine (CYMBALTA) 20 MG capsule Take 20 mg by mouth 2 times daily       Infant Care Products (JOHNSONS BABY SHAMPOO EX) Externally apply topically daily AND Apply to both eyes topically one time a day for eye drainage Use warm wash cloth and wipe eyes       Infant Care Products (JOHNSONS BABY SHAMPOO EX) Apply externally to both eyes as needed for eye drainage.       Memantine HCl (NAMENDA PO) Take 10 mg by mouth daily       menthol-zinc oxide (CALMOSEPTINE) 0.44-20.625 % OINT ointment Apply topically every 8 hours as needed for skin protection       miconazole with skin protectant (LOPEZ ANTIFUNGAL) 2 % CREA cream Apply topically every 8 hours Apply to buttocks topically every shift for barrier cream with each toileting       phenylephrine-shark liver oil-mineral oil-petrolatum (PREPARATION H) 0.25-14-74.9 % rectal ointment Place rectally daily as needed for hemorrhoids       senna-docusate (SENOKOT-S;PERICOLACE) 8.6-50 MG per tablet Take 1 tablet by mouth every 12 hours as needed for constipation       sodium chloride (OCEAN) 0.65 % nasal spray Spray 2 sprays into both nostrils 2 times daily , at 0900 & 2100       sodium chloride (OCEAN)  0.65 % nasal spray Spray 2 sprays into both nostrils every 8 hours as needed for congestion       tamsulosin (FLOMAX) 0.4 MG capsule Take 0.4 mg by mouth At Bedtime       Psyllium (NATURAL FIBER PO) Take 1 tsp. by mouth daily       ROS:  Unobtainable secondar tyo cognitive impairment.     Vitals:  /63   Pulse 55   Temp 96.8  F (36  C)   Resp 20   Wt 74.3 kg (163 lb 14.4 oz)   SpO2 95%   BMI 22.23 kg/m    Exam:  GENERAL APPEARANCE:  in no distress, nonverbal, minimal verbal response  ENT:  Mouth and posterior oropharynx normal, moist mucous membranes  EYES:  lids normal, eyes closed  RESP:  lungs clear to auscultation , no respiratory distress  CV:  reg rhythm, bradycardic today at 55  ABDOMEN:  no guarding or rebound, bowel sounds normal, no distension  M/S:   decreased muscle tone, no edema  PSYCH:  unable to assess    Lab/Diagnostic data:  Recent labs in [facility] reviewed by me today.     ASSESSMENT/PLAN:  (J69.0) Aspiration pneumonia, unspecified aspiration pneumonia type, unspecified laterality, unspecified part of lung (H)  (primary encounter diagnosis)  (R09.02) Hypoxia  (R13.10) Dysphagia, unspecified type  Comment: back-to-back hospitalizations for acute respiratory failure 2/2 aspiration pneumonia, completed IV antibiotic in hospital & PO Augmentin, remains on 2LMP NC   Plan:   --continue speech therapy   --upgraded to pureed texture, moderately thick consistency   --monitor O2, continue 2LPM, wean as able    (G30.1,  F02.81) Late onset Alzheimer's disease with behavioral disturbance  Comment: Seroquel discontinued d/t encephalopathy   Plan:   --monitor for increase in behaviors or change in sleep & eating patterns  --continues to be appropriate for secure Memory Care Unit  --continue Namenda, B12 & duloxetine     (R35.0) Urinary frequency  Comment: unclear history of prostate cancer  Plan:   --continue Flomax 0.4mg daily    Orders written by provider at facility  1. No new orders today, continue  same      Total time spent with patient visit at the skilled nursing facility was 39 minutes including including patient visit, review of past records & discussions about the POC. Greater than 50% of total time spent time on patient's counseling and coordinating care regarding conditions and treatment options as mentioned above.    Electronically signed by:  Penelope Seo NP

## 2019-06-06 PROBLEM — B99.9 INFECTIOUS ENCEPHALOPATHY: Status: RESOLVED | Noted: 2017-05-13 | Resolved: 2019-01-01
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
## 2019-06-06 PROBLEM — J96.01 ACUTE RESPIRATORY FAILURE WITH HYPOXIA (H): Status: RESOLVED | Noted: 2017-05-13 | Resolved: 2019-01-01

## 2019-06-06 PROBLEM — G93.49 INFECTIOUS ENCEPHALOPATHY: Status: RESOLVED | Noted: 2017-05-13 | Resolved: 2019-01-01

## 2019-06-06 PROBLEM — J18.9 HAP (HOSPITAL-ACQUIRED PNEUMONIA): Status: RESOLVED | Noted: 2017-01-16 | Resolved: 2019-01-01

## 2019-06-06 PROBLEM — H10.31 ACUTE BACTERIAL CONJUNCTIVITIS OF RIGHT EYE: Status: RESOLVED | Noted: 2017-05-13 | Resolved: 2019-01-01

## 2019-06-16 NOTE — PROGRESS NOTES
Patient was seen by Dr. Coronado on Eri 10, 2019 for initial long-term care visit at the [facility].    Case reviewed with nurse practitioner.    Patient was hospitalized at [facility] from May 19, 2019  Through May 22, 2019 for the treatment of acute hypoxic respiratory failure secondary to aspiration pneumonia.  Patient has a history of advanced Alzheimer's dementia with dysphagia.    He has been admitted to the memory care unit at the [facility].    Past medical history reviewed:    Advanced Alzheimer's dementia  Chronic dysphagia with recent hospitalization for aspiration pneumonia  History of prostate cancer    CODE STATUS: DNR/DNI, overall global care is comfort    Current medications:  Reviewed in epic, remarkable for Cymbalta, memantine, tamsulosin    No known allergies    Family history and social history are reviewed in epic    Review of systems unobtainable secondary to patient's cognitive deficits    Exam:  Chronically ill-appearing male, lying in the recliner, wearing oxygen.  HEENT exam: Bitemporal wasting.  Oral mucosa dry.  Face symmetric  Neck supple  Lungs clear, respiratory rate 14.  Breath sounds diminished bilaterally  CV RRR no murmurs  Abdomen soft, nontender, nondistended  Extremities, no edema.   generalized loss of muscle mass.  Neuro: Lethargic, briefly opens eyes to name.  No purposeful movement of extremities.  Generalized increased muscle tone.      Assessment    Dementia, advanced, with ongoing decline in mental functional status in the setting of dysphagia with recurrent aspiration pneumonia.  Patient remains at high risk of recurrent aspiration pneumonia, malnutrition, breakdown in skin, non-restaurant infection i.e. UTI    Dysphagia, followed by speech pathology    History of prostate cancer    Plan  Long-term care  Monitor swallowing, monitor respiratory status, nutritional status.  Monitor skin condition.  Consider discontinuation of  memantine  DNR/DNI, goal of care is comfort.  At some point, hospice may be appropriate.

## 2019-07-01 NOTE — LETTER
7/1/2019        RE: Eder Pearson  [address] [place] [address]        [facility]  Chief Complaint   Patient presents with     half-way Regulatory     [facility] Medical Record Number:  [id number]  Place of Service where encounter took place:  [facility] ([facility]) [[id number]]    HPI:    Eder Pearson  is 85 year old (7/9/1933), who is being seen today for a federally mandated E/M visit.  HPI information obtained from: facility chart records, facility staff, patient report and [facility] chart review.     Today's concerns are:  Recent hospital stay in May for acute respiratory failure with hypoxia 2/2  aspiration pneumonia. Remains on 2LPM NC with O2 93-98%. -142 for the last 2 weeks with HR 62-74. Weight 168.2lbs on 6/30, was 164.4lbs when transferred on 5/23. Requires osvaldo lift for transfers, utilizing Juditta chair. Good appetite, total assist with ADLs & eating.  Staff without acute concerns. Patient trying to engage in conversation with writer today. Soft spoken.     ALLERGIES:Patient has no known allergies.  PAST MEDICAL HISTORY:   has a past medical history of Anxiety, Dementia, and Prostate cancer (H).  PAST SURGICAL HISTORY:   has no past surgical history on file.  FAMILY HISTORY: family history is not on file.  SOCIAL HISTORY:  reports that he has never smoked. He does not have any smokeless tobacco history on file. He reports that he does not drink alcohol.    MEDICATIONS:  Current Outpatient Medications   Medication Sig Dispense Refill     acetaminophen (TYLENOL) 500 MG tablet Take 1,000 mg by mouth 3 times daily 0800, 1400, 2000 AND 1000 mg daily as needed       bisacodyl (DULCOLAX) 10 MG Suppository Place 10 mg rectally daily as needed for constipation       Cholecalciferol (VITAMIN D3 PO) Take 2,000 Units by mouth daily        Cyanocobalamin (VITAMIN B 12 PO) Take 1,000 mcg by mouth Every Mon, Wed, Fri Morning        DULoxetine (CYMBALTA) 20  MG capsule Take 20 mg by mouth 2 times daily       Infant Care Products (JOHNSONS BABY SHAMPOO EX) Externally apply topically daily AND Apply to both eyes topically one time a day for eye drainage Use warm wash cloth and wipe eyes       Infant Care Products (JOHNSONS BABY SHAMPOO EX) Apply externally to both eyes as needed for eye drainage.       Memantine HCl (NAMENDA PO) Take 10 mg by mouth daily       miconazole with skin protectant (LOPEZ ANTIFUNGAL) 2 % CREA cream Apply topically every 8 hours Apply to buttocks topically every shift for barrier cream with each toileting       phenylephrine-shark liver oil-mineral oil-petrolatum (PREPARATION H) 0.25-14-74.9 % rectal ointment Place rectally daily as needed for hemorrhoids       senna-docusate (SENOKOT-S;PERICOLACE) 8.6-50 MG per tablet Take 1 tablet by mouth every 12 hours as needed for constipation       sodium chloride (OCEAN) 0.65 % nasal spray Spray 2 sprays into both nostrils 2 times daily , at 0900 & 2100       sodium chloride (OCEAN) 0.65 % nasal spray Spray 2 sprays into both nostrils every 8 hours as needed for congestion       tamsulosin (FLOMAX) 0.4 MG capsule Take 0.4 mg by mouth At Bedtime       menthol-zinc oxide (CALMOSEPTINE) 0.44-20.625 % OINT ointment Apply topically every 8 hours as needed for skin protection         Case Management:  I have reviewed the care plan and MDS and do agree with the plan. Patient's desire to return to the community is not assessible due to cognitive impairment. Information reviewed:  Medications, vital signs, orders, and nursing notes.    ROS:  Limited secondary to cognitive impairment     Vitals:  /82   Pulse 74   Temp 97.2  F (36.2  C)   Resp 16   Wt 76.3 kg (168 lb 3.2 oz)   SpO2 96%   BMI 22.81 kg/m     Body mass index is 22.81 kg/m .  Exam:  GENERAL APPEARANCE:  Alert, in no distress, cooperative, thin, trying to engage in conversation this morning   ENT:  Mouth and posterior oropharynx normal, moist  mucous membranes  EYES:  EOM normal, Conjunctiva and lids normal  RESP:  lungs clear to auscultation, no respiratory distress  CV:  RRR, no murmur  ABDOMEN:  no guarding or rebound, bowel sounds normal, soft, non-tender  M/S:   decreased muscle tone, no edema  Skin: thin- wearing sleeve protectors on arms & heel protectors   PSYCH:  insight and judgement impaired, memory impaired , affect and mood normal    Lab/Diagnostic data:     Most Recent 3 CBC's:  Recent Labs   Lab Test 05/20/19  0701 05/19/19  1810 05/12/17  0637   WBC 9.8 11.6* 9.4   HGB 10.6* 12.7* 8.5*   * 103* 98    373 375     Most Recent 3 BMP's:  Recent Labs   Lab Test 05/20/19  0701 05/19/19 1810 05/12/17  0637    139 139   POTASSIUM 3.6 4.0 3.5   CHLORIDE 108 105 105   CO2 28 29 28   BUN 16 18 13   CR 0.77 0.82 0.98   ANIONGAP 5 5 6   ARANZA 8.0* 8.9 8.6   GLC 89 93 96       ASSESSMENT/PLAN  (R13.10) Dysphagia, unspecified type  Comment: back-to-back hospitalizations for acute respiratory failure 2/2 aspiration pneumonia, completed IV antibiotic in hospital & PO Augmentin, remains on 2LMP NC   Plan:   --continue pureed texture, moderately thick consistency   --monitor O2, continue 2LPM, wean as able     (G30.1,  F02.81) Late onset Alzheimer's disease with behavioral disturbance  Comment: Seroquel discontinued d/t encephalopathy   Plan:   --monitor for increase in behaviors or change in sleep & eating patterns  --continues to be appropriate for secure Memory Care Unit  --continue Namenda, B12 & duloxetine      (R35.0) Urinary frequency  Comment: unclear history of prostate cancer  Plan:   --continue Flomax 0.4mg daily    (D64.9) Anemia   Comment: Hgb 10.6 from 12.7, no evidence of bleed  Plan:  --follow Hgb     Orders written by provider at facility  1. Recheck hemoglobin       Electronically signed by:  Penelope Seo NP              Sincerely,        Penelope Seo NP

## 2019-07-24 NOTE — LETTER
7/24/2019        RE: Eder Pearson  [address] [place] [address]        [facility]  [facility] Record Number:  [id number]  Place of Service where encounter took place:  [facility] ([facility]) [[id number]]  Chief Complaint   Patient presents with     Nursing Home Acute       HPI:    Eder Pearson  is a 86 year old (7/9/1933), who is being seen today for an episodic care visit.  HPI information obtained from: facility chart records, facility staff, patient report and [facility] chart review.     Today's concern is:  Patient was started on low-dose baclofen last week for spasticity & contractions. Staff report having an extremely difficult time providing patient with cares d/t the above. Patient will at times hit out at staff when they are trying to assist him with cares involving movement of extremities. Patient appears to be tolerating 2.5mg dose without adverse effects. Staff denies any notable change in spasticity or contractions.  History of dysphagia, no reports of choking or coughing with PO intake. Afebrile. No behavioral concerns. O2 93-97% on 2LPM NC. -136 for the last month with HR 66-78. Weight 167.8lbs 7/21, was 164.4lbs when arrived in 05/2019. No reports of edema, SOB, CP, or dizziness.     Past Medical and Surgical History reviewed in Epic today.    MEDICATIONS:  Current Outpatient Medications   Medication Sig Dispense Refill     acetaminophen (TYLENOL) 500 MG tablet Take 1,000 mg by mouth 3 times daily 0800, 1400, 2000 AND 1000 mg daily as needed       baclofen (LIORESAL) 5 mg TABS half-tab Take 5 mg by mouth 3 times daily        bisacodyl (DULCOLAX) 10 MG Suppository Place 10 mg rectally daily as needed for constipation       Cholecalciferol (VITAMIN D3 PO) Take 2,000 Units by mouth daily        Cyanocobalamin (VITAMIN B 12 PO) Take 1,000 mcg by mouth Every Mon, Wed, Fri Morning        DULoxetine (CYMBALTA) 20 MG capsule Take 20 mg by mouth 2  times daily       Infant Care Products (JOHNSONS BABY SHAMPOO EX) Externally apply topically daily AND Apply to both eyes topically one time a day for eye drainage Use warm wash cloth and wipe eyes       Infant Care Products (JOHNSONS BABY SHAMPOO EX) Apply externally to both eyes as needed for eye drainage.       Memantine HCl (NAMENDA PO) Take 10 mg by mouth daily       miconazole with skin protectant (LOPEZ ANTIFUNGAL) 2 % CREA cream Apply topically every 8 hours Apply to buttocks topically every shift for barrier cream with each toileting       phenylephrine-shark liver oil-mineral oil-petrolatum (PREPARATION H) 0.25-14-74.9 % rectal ointment Place rectally daily as needed for hemorrhoids       senna-docusate (SENOKOT-S;PERICOLACE) 8.6-50 MG per tablet Take 1 tablet by mouth every 12 hours as needed for constipation       sodium chloride (OCEAN) 0.65 % nasal spray Spray 2 sprays into both nostrils 2 times daily , at 0900 & 2100       sodium chloride (OCEAN) 0.65 % nasal spray Spray 2 sprays into both nostrils every 8 hours as needed for congestion       tamsulosin (FLOMAX) 0.4 MG capsule Take 0.4 mg by mouth At Bedtime         REVIEW OF SYSTEMS:  Limited secondary to cognitive impairment     Objective:  /71   Pulse 66   Temp 96.2  F (35.7  C)   Resp 18   Wt 76.1 kg (167 lb 12.8 oz)   SpO2 94%   BMI 22.76 kg/m     Exam:  GENERAL APPEARANCE:  Alert, in no distress, cooperative, thin, calm   ENT:  Mouth and posterior oropharynx normal, moist mucous membranes  EYES:  EOM normal, Conjunctiva and lids normal  RESP:  lungs clear to auscultation, no respiratory distress  CV:  RRR, no murmur  ABDOMEN:  no guarding or rebound, bowel sounds normal, soft, non-tender  M/S:   decreased muscle tone, no edema  Skin: thin- wearing sleeve protectors on arms & heel protectors   PSYCH:  insight and judgement impaired, memory impaired, affect and mood normal    Labs:   Recent labs in [facility] reviewed by me today.      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 Increase Baclofen to 5mg PO TID      Electronically signed by:  Penelope Seo NP               Sincerely,        Penelope Seo, NP

## 2019-07-24 NOTE — PROGRESS NOTES
[facility]  [facility] Medical Record Number:  [id number]  Place of Service where encounter took place:  [facility] ([facility]) [[id number]]  Chief Complaint   Patient presents with     Nursing Home Acute       HPI:    Eder Pearson  is a 86 year old (7/9/1933), who is being seen today for an episodic care visit.  HPI information obtained from: facility chart records, facility staff, patient report and [facility] chart review.     Today's concern is:  Patient was started on low-dose baclofen last week for spasticity & contractions. Staff report having an extremely difficult time providing patient with cares d/t the above. Patient will at times hit out at staff when they are trying to assist him with cares involving movement of extremities. Patient appears to be tolerating 2.5mg dose without adverse effects. Staff denies any notable change in spasticity or contractions.  History of dysphagia, no reports of choking or coughing with PO intake. Afebrile. No behavioral concerns. O2 93-97% on 2LPM NC. -136 for the last month with HR 66-78. Weight 167.8lbs 7/21, was 164.4lbs when arrived in 05/2019. No reports of edema, SOB, CP, or dizziness.     Past Medical and Surgical History reviewed in Epic today.    MEDICATIONS:  Current Outpatient Medications   Medication Sig Dispense Refill     acetaminophen (TYLENOL) 500 MG tablet Take 1,000 mg by mouth 3 times daily 0800, 1400, 2000 AND 1000 mg daily as needed       baclofen (LIORESAL) 5 mg TABS half-tab Take 5 mg by mouth 3 times daily        bisacodyl (DULCOLAX) 10 MG Suppository Place 10 mg rectally daily as needed for constipation       Cholecalciferol (VITAMIN D3 PO) Take 2,000 Units by mouth daily        Cyanocobalamin (VITAMIN B 12 PO) Take 1,000 mcg by mouth Every Mon, Wed, Fri Morning        DULoxetine (CYMBALTA) 20 MG capsule Take 20 mg by mouth 2 times daily       Infant Care Products (JOHNSONS BABY SHAMPOO EX) Externally apply topically  daily AND Apply to both eyes topically one time a day for eye drainage Use warm wash cloth and wipe eyes       Infant Care Products (JOHNSONS BABY SHAMPOO EX) Apply externally to both eyes as needed for eye drainage.       Memantine HCl (NAMENDA PO) Take 10 mg by mouth daily       miconazole with skin protectant (LOPEZ ANTIFUNGAL) 2 % CREA cream Apply topically every 8 hours Apply to buttocks topically every shift for barrier cream with each toileting       phenylephrine-shark liver oil-mineral oil-petrolatum (PREPARATION H) 0.25-14-74.9 % rectal ointment Place rectally daily as needed for hemorrhoids       senna-docusate (SENOKOT-S;PERICOLACE) 8.6-50 MG per tablet Take 1 tablet by mouth every 12 hours as needed for constipation       sodium chloride (OCEAN) 0.65 % nasal spray Spray 2 sprays into both nostrils 2 times daily , at 0900 & 2100       sodium chloride (OCEAN) 0.65 % nasal spray Spray 2 sprays into both nostrils every 8 hours as needed for congestion       tamsulosin (FLOMAX) 0.4 MG capsule Take 0.4 mg by mouth At Bedtime         REVIEW OF SYSTEMS:  Limited secondary to cognitive impairment     Objective:  /71   Pulse 66   Temp 96.2  F (35.7  C)   Resp 18   Wt 76.1 kg (167 lb 12.8 oz)   SpO2 94%   BMI 22.76 kg/m    Exam:  GENERAL APPEARANCE:  Alert, in no distress, cooperative, thin, calm   ENT:  Mouth and posterior oropharynx normal, moist mucous membranes  EYES:  EOM normal, Conjunctiva and lids normal  RESP:  lungs clear to auscultation, no respiratory distress  CV:  RRR, no murmur  ABDOMEN:  no guarding or rebound, bowel sounds normal, soft, non-tender  M/S:   decreased muscle tone, no edema  Skin: thin- wearing sleeve protectors on arms & heel protectors   PSYCH:  insight and judgement impaired, memory impaired, affect and mood normal    Labs:   Recent labs in [facility] reviewed by me today.     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 Increase Baclofen to 5mg PO TID      Electronically signed by:  Penelope Seo NP

## 2019-07-30 NOTE — LETTER
7/30/2019        RE: Eder Pearson  [address] [place] [address]        [facility]  [facility] Medical Record Number:  [id number]  Place of Service where encounter took place:  [facility] ([facility]) [[id number]]  Chief Complaint   Patient presents with     Nursing Home Acute       HPI:    Eder Pearson  is a 86 year old (7/9/1933), who is being seen today for an episodic care visit.  HPI information obtained from: facility chart records, facility staff, patient report and [facility] chart review.     Today's concern is:  Patient was started on baclofen a couple weeks ago, dose increased 1 week ago, for spasticity & contractions. Staff report having an extremely difficult time providing patient with cares d/t the above. Patient will at times hit out at staff when they are trying to assist him with cares involving movement of extremities. Spoke with the nurse manager who reports some improvement in cares. Patient is moving extremities more freely. Patient's wife reported to staff that she is very happy with the medication & she feels he has been more alert when she visits. Patient appears to be tolerating 5mg dose without adverse effects. No behavioral concerns.  History of dysphagia, no reports of choking or coughing with PO intake. Remains on dysphagia diet. Afebrile. No reports of edema, SOB, CP, or dizziness. O2 94-97% on 2LPM NC for the last 6 days. Unsure if weaning attempted.   -126 for the last week with HR 66-7.1 Weight 168.2lbs 7/28, was 164.4lbs when arrived in 05/2019.       Past Medical and Surgical History reviewed in Epic today.    MEDICATIONS:  Current Outpatient Medications   Medication Sig Dispense Refill     acetaminophen (TYLENOL) 500 MG tablet Take 1,000 mg by mouth 3 times daily 0800, 1400, 2000 AND 1000 mg daily as needed       bisacodyl (DULCOLAX) 10 MG Suppository Place 10 mg rectally daily as needed for constipation       Cholecalciferol  (VITAMIN D3 PO) Take 2,000 Units by mouth daily        Cyanocobalamin (VITAMIN B 12 PO) Take 1,000 mcg by mouth Every Mon, Wed, Fri Morning        DULoxetine (CYMBALTA) 20 MG capsule Take 20 mg by mouth 2 times daily       Infant Care Products (JOHNSONS BABY SHAMPOO EX) Externally apply topically daily AND Apply to both eyes topically one time a day for eye drainage Use warm wash cloth and wipe eyes       Infant Care Products (JOHNSONS BABY SHAMPOO EX) Apply externally to both eyes as needed for eye drainage.       Memantine HCl (NAMENDA PO) Take 10 mg by mouth daily       miconazole with skin protectant (LOPEZ ANTIFUNGAL) 2 % CREA cream Apply topically every 8 hours Apply to buttocks topically every shift for barrier cream with each toileting       phenylephrine-shark liver oil-mineral oil-petrolatum (PREPARATION H) 0.25-14-74.9 % rectal ointment Place rectally daily as needed for hemorrhoids       senna-docusate (SENOKOT-S;PERICOLACE) 8.6-50 MG per tablet Take 1 tablet by mouth every 12 hours as needed for constipation       sodium chloride (OCEAN) 0.65 % nasal spray Spray 2 sprays into both nostrils 2 times daily , at 0900 & 2100       sodium chloride (OCEAN) 0.65 % nasal spray Spray 2 sprays into both nostrils every 8 hours as needed for congestion       tamsulosin (FLOMAX) 0.4 MG capsule Take 0.4 mg by mouth At Bedtime       Baclofen 5 MG TABS Take 5 mg by mouth 3 times daily         REVIEW OF SYSTEMS:  Limited secondary to cognitive impairment     Objective:  /74   Pulse 71   Temp 96.4  F (35.8  C)   Resp 16   Ht 1.829 m (6')   Wt 76.3 kg (168 lb 3.2 oz)   SpO2 95%   BMI 22.81 kg/m     Exam:  GENERAL APPEARANCE:  Sleepy today but responds to verbal stimuli, in no distress, cooperative, thin, calm   ENT:  Mouth and posterior oropharynx normal, moist mucous membranes  EYES:  EOM normal, Conjunctiva and lids normal  RESP:  lungs clear to auscultation, no respiratory distress  CV:  RRR, no  murmur  ABDOMEN:  no guarding or rebound, bowel sounds normal, soft, non-tender  M/S:   decreased muscle tone, no edema  Skin: thin- wearing sleeve protectors on arms & heel protectors   PSYCH:  insight and judgement impaired, memory impaired, affect and mood normal    Labs:     Most Recent 3 CBC's:  Recent Labs   Lab Test 07/18/19 05/20/19  0701 05/19/19  1810 05/12/17  0637   WBC  --  9.8 11.6* 9.4   HGB 9.3* 10.6* 12.7* 8.5*   MCV  --  103* 103* 98   PLT  --  281 373 375     Most Recent 3 BMP's:  Recent Labs   Lab Test 05/20/19  0701 05/19/19 1810 05/12/17  0637    139 139   POTASSIUM 3.6 4.0 3.5   CHLORIDE 108 105 105   CO2 28 29 28   BUN 16 18 13   CR 0.77 0.82 0.98   ANIONGAP 5 5 6   ARANZA 8.0* 8.9 8.6   GLC 89 93 96       ASSESSMENT/PLAN:  (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  (primary encounter diagnosis)  Comment: chronic, difficult time assisting with cares has improved, patient appears more comfortable & alert  Plan:   --continue Baclofen 5mg PO TID  --monitor for adverse effects    (R09.02) Hypoxia  (R13.10) Dysphagia, unspecified type  Comment: back-to-back hospitalizations for acute respiratory failure 2/2 aspiration pneumonia, completed IV antibiotic in hospital & PO Augmentin, remains on 2LMP NC   Plan:   --wean O2 to keep sats >90%  --continue pureed texture, moderately thick consistency   --monitor for s/s aspiration      (G30.1,  F02.81) Late onset Alzheimer's disease with behavioral disturbance  Comment:  no behavioral concerns, not on antipsychotic   Plan:   --monitor for increase in behaviors or change in sleep & eating patterns  --continues to be appropriate for secure Memory Care Unit  --continue Namenda, B12 & duloxetine     (D64.9) Anemia   Comment: trending down, Hgb 9.3 <--- 10.6 from 12.7, no evidence of bleed, iron panel not previously completed, not on Fe   Plan:  --iron studies     Orders written by provider at facility  1. Iron, ferritin, B12 & folate  2. Wean O2 to keep sats  >90%      Electronically signed by:  Penelope Seo NP             Sincerely,        Penelope Seo, NP

## 2019-07-30 NOTE — PROGRESS NOTES
[place] [facility]  [place] [facility] Record Number:  [id number]  Place of Service where encounter took place:  [facility] (S) [[id number]]  Chief Complaint   Patient presents with     Nursing Home Acute       HPI:    Eder Pearson  is a 86 year old (7/9/1933), who is being seen today for an episodic care visit.  HPI information obtained from: facility chart records, facility staff, patient report and [facility] chart review.     Today's concern is:  Patient was started on baclofen a couple weeks ago, dose increased 1 week ago, for spasticity & contractions. Staff report having an extremely difficult time providing patient with cares d/t the above. Patient will at times hit out at staff when they are trying to assist him with cares involving movement of extremities. Spoke with the nurse manager who reports some improvement in cares. Patient is moving extremities more freely. Patient's wife reported to staff that she is very happy with the medication & she feels he has been more alert when she visits. Patient appears to be tolerating 5mg dose without adverse effects. No behavioral concerns.  History of dysphagia, no reports of choking or coughing with PO intake. Remains on dysphagia diet. Afebrile. No reports of edema, SOB, CP, or dizziness. O2 94-97% on 2LPM NC for the last 6 days. Unsure if weaning attempted.   -126 for the last week with HR 66-7.1 Weight 168.2lbs 7/28, was 164.4lbs when arrived in 05/2019.       Past Medical and Surgical History reviewed in Epic today.    MEDICATIONS:  Current Outpatient Medications   Medication Sig Dispense Refill     acetaminophen (TYLENOL) 500 MG tablet Take 1,000 mg by mouth 3 times daily 0800, 1400, 2000 AND 1000 mg daily as needed       bisacodyl (DULCOLAX) 10 MG Suppository Place 10 mg rectally daily as needed for constipation       Cholecalciferol (VITAMIN D3 PO) Take 2,000 Units by mouth daily        Cyanocobalamin (VITAMIN B 12 PO) Take  1,000 mcg by mouth Every Mon, Wed, Fri Morning        DULoxetine (CYMBALTA) 20 MG capsule Take 20 mg by mouth 2 times daily       Infant Care Products (JOHNSONS BABY SHAMPOO EX) Externally apply topically daily AND Apply to both eyes topically one time a day for eye drainage Use warm wash cloth and wipe eyes       Infant Care Products (JOHNSONS BABY SHAMPOO EX) Apply externally to both eyes as needed for eye drainage.       Memantine HCl (NAMENDA PO) Take 10 mg by mouth daily       miconazole with skin protectant (LOPEZ ANTIFUNGAL) 2 % CREA cream Apply topically every 8 hours Apply to buttocks topically every shift for barrier cream with each toileting       phenylephrine-shark liver oil-mineral oil-petrolatum (PREPARATION H) 0.25-14-74.9 % rectal ointment Place rectally daily as needed for hemorrhoids       senna-docusate (SENOKOT-S;PERICOLACE) 8.6-50 MG per tablet Take 1 tablet by mouth every 12 hours as needed for constipation       sodium chloride (OCEAN) 0.65 % nasal spray Spray 2 sprays into both nostrils 2 times daily , at 0900 & 2100       sodium chloride (OCEAN) 0.65 % nasal spray Spray 2 sprays into both nostrils every 8 hours as needed for congestion       tamsulosin (FLOMAX) 0.4 MG capsule Take 0.4 mg by mouth At Bedtime       Baclofen 5 MG TABS Take 5 mg by mouth 3 times daily         REVIEW OF SYSTEMS:  Limited secondary to cognitive impairment     Objective:  /74   Pulse 71   Temp 96.4  F (35.8  C)   Resp 16   Ht 1.829 m (6')   Wt 76.3 kg (168 lb 3.2 oz)   SpO2 95%   BMI 22.81 kg/m    Exam:  GENERAL APPEARANCE:  Sleepy today but responds to verbal stimuli, in no distress, cooperative, thin, calm   ENT:  Mouth and posterior oropharynx normal, moist mucous membranes  EYES:  EOM normal, Conjunctiva and lids normal  RESP:  lungs clear to auscultation, no respiratory distress  CV:  RRR, no murmur  ABDOMEN:  no guarding or rebound, bowel sounds normal, soft, non-tender  M/S:   decreased muscle  tone, no edema  Skin: thin- wearing sleeve protectors on arms & heel protectors   PSYCH:  insight and judgement impaired, memory impaired, affect and mood normal    Labs:     Most Recent 3 CBC's:  Recent Labs   Lab Test 07/18/19 05/20/19  0701 05/19/19 1810 05/12/17  0637   WBC  --  9.8 11.6* 9.4   HGB 9.3* 10.6* 12.7* 8.5*   MCV  --  103* 103* 98   PLT  --  281 373 375     Most Recent 3 BMP's:  Recent Labs   Lab Test 05/20/19  0701 05/19/19 1810 05/12/17  0637    139 139   POTASSIUM 3.6 4.0 3.5   CHLORIDE 108 105 105   CO2 28 29 28   BUN 16 18 13   CR 0.77 0.82 0.98   ANIONGAP 5 5 6   ARANZA 8.0* 8.9 8.6   GLC 89 93 96       ASSESSMENT/PLAN:  (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  (primary encounter diagnosis)  Comment: chronic, difficult time assisting with cares has improved, patient appears more comfortable & alert  Plan:   --continue Baclofen 5mg PO TID  --monitor for adverse effects    (R09.02) Hypoxia  (R13.10) Dysphagia, unspecified type  Comment: back-to-back hospitalizations for acute respiratory failure 2/2 aspiration pneumonia, completed IV antibiotic in hospital & PO Augmentin, remains on 2LMP NC   Plan:   --wean O2 to keep sats >90%  --continue pureed texture, moderately thick consistency   --monitor for s/s aspiration      (G30.1,  F02.81) Late onset Alzheimer's disease with behavioral disturbance  Comment: no behavioral concerns, not on antipsychotic   Plan:   --monitor for increase in behaviors or change in sleep & eating patterns  --continues to be appropriate for secure Memory Care Unit  --continue Namenda, B12 & duloxetine     (D64.9) Anemia   Comment: trending down, Hgb 9.3 <--- 10.6 from 12.7, no evidence of bleed, iron panel not previously completed, not on Fe   Plan:  --iron studies     Orders written by provider at facility  1. Iron, ferritin, B12 & folate  2. Wean O2 to keep sats >90%      Electronically signed by:  Penelope Seo NP

## 2019-08-08 NOTE — PROGRESS NOTES
Patient was seen for a regulatory long-term care visit.  Course reviewed with nurse practitioner.      Patient status has been stable over the last few months.  He remains resistant to cares.  Baclofen has been started for the treatment of spasticity, some improvement.    Patient is sleepy, lying in bed.   He does not awaken to light stimulation and voice.    Oxygen in place.  Respiratory rate 12, unlabored  Lungs clear without wheezing or rhonchi or crackles  CV RRR  Abdomen soft, nondistended  Generalized rigidity  No edema    Hemoglobin most recent 9.3, down from 10.6 in May.  Basic metabolic panel normal.      Assessment    Dementia, advanced.  Spasticity felt to be improved with low-dose baclofen which appears to be well-tolerated  Dysphagia secondary to dementia, with recurrent aspiration pneumonia.  Stable over the last few months.  He remains on O2.  Lung exam unremarkable.  He remains on dysphagia diet.  Anemia, most likely secondary to chronic illness    Plan  Continue current medications.  Attempt to wean oxygen  Long-term care

## 2019-09-30 NOTE — PROGRESS NOTES
[place] [facility]  [place] [facility] Record Number:  [id number]  Place of Service where encounter took place:  [facility] (S) [[id number]]  Chief Complaint   Patient presents with     Nursing Home Acute     swollen eyes       HPI:    Eder Pearson  is a 86 year old (7/9/1933), who is being seen today for an episodic care visit.  HPI information obtained from: facility chart records, facility staff, patient report and [facility] chart review    Today's concern is:  VMs from staff over the weekend with concerns regarding swelling of both eyelids & redness to R earlobe.   Upon exam, no edema noted to either eyelid. Staff did report that patient has tendency to sleep face down in pillow. Patient currently resting in bed, supine. History of swelling to hands which is not noted today. Small red bump noted to earlobe without evidence of pain, warmth or swelling.   Staff reports wife would like to continue current Baclofen dose as she feels he has appeared more comfortable & alert with her visits. Staff denies evidence of pain & are requesting lowering Tylenol dose.  History of aspiration pneumonia requiring supplemental O2. Have been able to successfully wean off O2, keeping sats 90-96% on RA. No reports of SOB, dyspnea, or cough. Remains on dysphagia diet without evidence of recent aspiration.     Past Medical and Surgical History reviewed in Epic today.    MEDICATIONS:  Current Outpatient Medications   Medication Sig Dispense Refill     acetaminophen (TYLENOL) 500 MG tablet Take 1,000 mg by mouth 3 times daily 0800, 1400, 2000 AND 1000 mg daily as needed       Baclofen 5 MG TABS Take 5 mg by mouth 3 times daily       bisacodyl (DULCOLAX) 10 MG Suppository Place 10 mg rectally daily as needed for constipation       Cholecalciferol (VITAMIN D3 PO) Take 2,000 Units by mouth daily        DULoxetine (CYMBALTA) 20 MG capsule Take 20 mg by mouth 2 times daily       Infant Care Products (JOHNSONS BABY  SHAMPOO EX) Apply to both eyes topically one time a day for eye drainage Use warm wash cloth and wipe eyes       Infant Care Products (JOHNSONS BABY SHAMPOO EX) Apply externally to both eyes as needed for eye drainage.       Memantine HCl (NAMENDA PO) Take 10 mg by mouth daily       miconazole with skin protectant (LOPEZ ANTIFUNGAL) 2 % CREA cream Apply topically every 8 hours Apply to buttocks topically every shift for barrier cream with each toileting       phenylephrine-shark liver oil-mineral oil-petrolatum (PREPARATION H) 0.25-14-74.9 % rectal ointment Place rectally daily as needed for hemorrhoids       senna-docusate (SENOKOT-S;PERICOLACE) 8.6-50 MG per tablet Take 1 tablet by mouth every 12 hours as needed for constipation       sodium chloride (OCEAN) 0.65 % nasal spray Spray 2 sprays into both nostrils 2 times daily , at 0900 & 2100       sodium chloride (OCEAN) 0.65 % nasal spray Spray 2 sprays into both nostrils every 8 hours as needed for congestion       tamsulosin (FLOMAX) 0.4 MG capsule Take 0.4 mg by mouth At Bedtime       Cyanocobalamin (VITAMIN B 12 PO) Take 1,000 mcg by mouth Every Mon, Wed, Fri Morning          REVIEW OF SYSTEMS:  Limited secondary to cognitive impairment     Objective:  /71   Pulse 71   Temp 97.3  F (36.3  C)   Resp 16   Wt 74.1 kg (163 lb 4.8 oz)   SpO2 90%   BMI 22.15 kg/m    Exam:  GENERAL APPEARANCE:  resting in bed, sleepy today but responds to verbal stimuli, in no distress, cooperative, thin, calm   ENT:  small red papule noted to R earlobe without drainage, swelling, increased warmth or pain; moist mucous membranes  EYES:  EOM normal, Conjunctiva and lids normal: no swelling or edema noted to eyelids or around eyes  RESP:  lungs clear to auscultation, no respiratory distress  CV:  RRR, no murmur  ABDOMEN:  no guarding or rebound, bowel sounds normal, soft, non-tender  M/S:   decreased muscle tone, no edema  Skin: thin- wearing sleeve protectors on arms & heel  protectors   PSYCH:  insight and judgement impaired, memory impaired, affect and mood normal    Labs:     Most Recent 3 CBC's:  Recent Labs   Lab Test 07/18/19 05/20/19  0701 05/19/19 1810 05/12/17  0637   WBC  --  9.8 11.6* 9.4   HGB 9.3* 10.6* 12.7* 8.5*   MCV  --  103* 103* 98   PLT  --  281 373 375     Most Recent 3 BMP's:  Recent Labs   Lab Test 05/20/19  0701 05/19/19 1810 05/12/17  0637    139 139   POTASSIUM 3.6 4.0 3.5   CHLORIDE 108 105 105   CO2 28 29 28   BUN 16 18 13   CR 0.77 0.82 0.98   ANIONGAP 5 5 6   ARANZA 8.0* 8.9 8.6   GLC 89 93 96       ASSESSMENT/PLAN:  (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  (primary encounter diagnosis)  Comment: chronic, difficult time assisting with cares has improved, patient appears more comfortable & alert  Plan:   --continue Baclofen 5mg PO TID  --decrease Tylenol to BID dosing  --monitor for adverse effects     (R09.02) Hypoxia  (R13.10) Dysphagia, unspecified type  Comment: back-to-back hospitalizations for acute respiratory failure 2/2 aspiration pneumonia, completed IV antibiotic in hospital & PO Augmentin; no longer requiring supplemental O2  Plan:   --wean O2 to keep sats 90% or above  --continue pureed texture, moderately thick consistency   --monitor for s/s aspiration      (G30.1,  F02.81) Late onset Alzheimer's disease with behavioral disturbance  Comment: no behavioral concerns, not on antipsychotic   Plan:   --monitor for increase in behaviors or change in sleep & eating patterns  --continues to be appropriate for [facility]  --continue Namenda & duloxetine      (D64.9) Anemia   (E53.8) Folate deficiency   Comment: trending down, Hgb 9.3 <--- 10.6 from 12.7, no evidence of bleed, normal iron, low folate, elevated B12  Plan:  --start folic acid 1mg PO daily  --recheck folate & Hgb in 4 weeks    Orders written by provider at facility  1. Change Tylenol to 1,000mg PO BID & q 8 hr prn for pain or fever  2. Folic acid 1mg PO daily  3. Recheck folate & Hgb in 4  weeks      Electronically signed by:  Penelope Seo, NP

## 2019-09-30 NOTE — LETTER
9/30/2019        RE: Eder Pearson  [address] [place] [address]        [place] [facility]  [place] [facility] Record Number:  [id number]  Place of Service where encounter took place:  [facility] [[id number]]  Chief Complaint   Patient presents with     Nursing Home Acute     swollen eyes       HPI:    Eder Pearson  is a 86 year old (7/9/1933), who is being seen today for an episodic care visit.  HPI information obtained from: facility chart records, facility staff, patient report and [facility] chart review    Today's concern is:  VMs from staff over the weekend with concerns regarding swelling of both eyelids & redness to R earlobe.   Upon exam, no edema noted to either eyelid. Staff did report that patient has tendency to sleep face down in pillow. Patient currently resting in bed, supine. History of swelling to hands which is not noted today. Small red bump noted to earlobe without evidence of pain, warmth or swelling.   Staff reports wife would like to continue current Baclofen dose as she feels he has appeared more comfortable & alert with her visits. Staff denies evidence of pain & are requesting lowering Tylenol dose.  History of aspiration pneumonia requiring supplemental O2. Have been able to successfully wean off O2, keeping sats 90-96% on RA. No reports of SOB, dyspnea, or cough. Remains on dysphagia diet without evidence of recent aspiration.     Past Medical and Surgical History reviewed in Epic today.    MEDICATIONS:  Current Outpatient Medications   Medication Sig Dispense Refill     acetaminophen (TYLENOL) 500 MG tablet Take 1,000 mg by mouth 3 times daily 0800, 1400, 2000 AND 1000 mg daily as needed       Baclofen 5 MG TABS Take 5 mg by mouth 3 times daily       bisacodyl (DULCOLAX) 10 MG Suppository Place 10 mg rectally daily as needed for constipation       Cholecalciferol (VITAMIN D3 PO) Take 2,000 Units by mouth daily        DULoxetine  (CYMBALTA) 20 MG capsule Take 20 mg by mouth 2 times daily       Infant Care Products (JOHNSONS BABY SHAMPOO EX) Apply to both eyes topically one time a day for eye drainage Use warm wash cloth and wipe eyes       Infant Care Products (JOHNSONS BABY SHAMPOO EX) Apply externally to both eyes as needed for eye drainage.       Memantine HCl (NAMENDA PO) Take 10 mg by mouth daily       miconazole with skin protectant (LOPEZ ANTIFUNGAL) 2 % CREA cream Apply topically every 8 hours Apply to buttocks topically every shift for barrier cream with each toileting       phenylephrine-shark liver oil-mineral oil-petrolatum (PREPARATION H) 0.25-14-74.9 % rectal ointment Place rectally daily as needed for hemorrhoids       senna-docusate (SENOKOT-S;PERICOLACE) 8.6-50 MG per tablet Take 1 tablet by mouth every 12 hours as needed for constipation       sodium chloride (OCEAN) 0.65 % nasal spray Spray 2 sprays into both nostrils 2 times daily , at 0900 & 2100       sodium chloride (OCEAN) 0.65 % nasal spray Spray 2 sprays into both nostrils every 8 hours as needed for congestion       tamsulosin (FLOMAX) 0.4 MG capsule Take 0.4 mg by mouth At Bedtime       Cyanocobalamin (VITAMIN B 12 PO) Take 1,000 mcg by mouth Every Mon, Wed, Fri Morning          REVIEW OF SYSTEMS:  Limited secondary to cognitive impairment     Objective:  /71   Pulse 71   Temp 97.3  F (36.3  C)   Resp 16   Wt 74.1 kg (163 lb 4.8 oz)   SpO2 90%   BMI 22.15 kg/m     Exam:  GENERAL APPEARANCE:  resting in bed, sleepy today but responds to verbal stimuli, in no distress, cooperative, thin, calm   ENT:  small red papule noted to R earlobe without drainage, swelling, increased warmth or pain; moist mucous membranes  EYES:  EOM normal, Conjunctiva and lids normal: no swelling or edema noted to eyelids or around eyes  RESP:  lungs clear to auscultation, no respiratory distress  CV:  RRR, no murmur  ABDOMEN:  no guarding or rebound, bowel sounds normal, soft,  non-tender  M/S:   decreased muscle tone, no edema  Skin: thin- wearing sleeve protectors on arms & heel protectors   PSYCH:  insight and judgement impaired, memory impaired, affect and mood normal    Labs:     Most Recent 3 CBC's:  Recent Labs   Lab Test 07/18/19 05/20/19  0701 05/19/19 1810 05/12/17  0637   WBC  --  9.8 11.6* 9.4   HGB 9.3* 10.6* 12.7* 8.5*   MCV  --  103* 103* 98   PLT  --  281 373 375     Most Recent 3 BMP's:  Recent Labs   Lab Test 05/20/19  0701 05/19/19 1810 05/12/17  0637    139 139   POTASSIUM 3.6 4.0 3.5   CHLORIDE 108 105 105   CO2 28 29 28   BUN 16 18 13   CR 0.77 0.82 0.98   ANIONGAP 5 5 6   ARANZA 8.0* 8.9 8.6   GLC 89 93 96       ASSESSMENT/PLAN:  (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  (primary encounter diagnosis)  Comment: chronic, difficult time assisting with cares has improved, patient appears more comfortable & alert  Plan:   --continue Baclofen 5mg PO TID  --decrease Tylenol to BID dosing  --monitor for adverse effects     (R09.02) Hypoxia  (R13.10) Dysphagia, unspecified type  Comment: back-to-back hospitalizations for acute respiratory failure 2/2 aspiration pneumonia, completed IV antibiotic in hospital & PO Augmentin; no longer requiring supplemental O2  Plan:   --wean O2 to keep sats 90% or above  --continue pureed texture, moderately thick consistency   --monitor for s/s aspiration      (G30.1,  F02.81) Late onset Alzheimer's disease with behavioral disturbance  Comment: no behavioral concerns, not on antipsychotic   Plan:   --monitor for increase in behaviors or change in sleep & eating patterns  --continues to be appropriate for secure Memory Care Unit  --continue Namenda & duloxetine      (D64.9) Anemia   (E53.8) Folate deficiency   Comment: trending down, Hgb 9.3 <--- 10.6 from 12.7, no evidence of bleed, normal iron, low folate, elevated B12  Plan:  --start folic acid 1mg PO daily  --recheck folate & Hgb in 4 weeks    Orders written by provider at facility  1. Change Tylenol to  1,000mg PO BID & q 8 hr prn for pain or fever  2. Folic acid 1mg PO daily  3. Recheck folate & Hgb in 4 weeks      Electronically signed by:  Penelope Seo NP                 Sincerely,        Penelope Seo, NP

## 2019-10-07 PROBLEM — R13.10 DYSPHAGIA, UNSPECIFIED TYPE: Status: ACTIVE | Noted: 2019-01-01

## 2019-10-07 PROBLEM — D64.9 ANEMIA, UNSPECIFIED TYPE: Status: ACTIVE | Noted: 2019-01-01

## 2019-10-07 PROBLEM — E53.8 FOLATE DEFICIENCY: Status: ACTIVE | Noted: 2019-01-01

## 2019-10-07 PROBLEM — R25.2 SPASTICITY: Status: ACTIVE | Noted: 2019-01-01

## 2019-11-20 NOTE — LETTER
11/20/2019        RE: Eder Pearson  [address] [place] [address]        [facility]  [place] [facility] Record Number:  [id number]  Place of Service where encounter took place:  [facility] ([facility]) [[id number]]  Chief Complaint   Patient presents with     RECHECK     med review + skin issue       HPI:    Eder Pearson  is a 86 year old (7/9/1933), who is being seen today for an episodic care visit.  HPI information obtained from: facility chart records, facility staff, patient report, [facility] chart review and family/first contact daughter report.     Today's concern is:  Staff requesting med review, TOSHIA Martino. Patient currently resting in bed, on his side, without complaint. Staff without acute concerns.   Wife feels patient has appeared more comfortable & alert with her visits however she has concerns about patient seeming less alert & engaging in less conversation during her visits. Fair appetite.   History of aspiration pneumonia requiring supplemental O2. Have been able to successfully wean off O2, keeping sats 90-96% on RA. No reports of SOB, dyspnea, or cough. Remains on dysphagia diet without evidence of recent aspiration.   -130 x1 month with HR 58-80. Weight 157 on 11/17; 164lbs 05/2019.    Past Medical and Surgical History reviewed in Epic today.    MEDICATIONS:  Current Outpatient Medications   Medication Sig Dispense Refill     acetaminophen (TYLENOL) 500 MG tablet Take 1,000 mg by mouth 2 times daily And 1000 mg 8 hours daily as needed       Baclofen 5 MG TABS Take 5 mg by mouth 3 times daily       bisacodyl (DULCOLAX) 10 MG Suppository Place 10 mg rectally daily as needed for constipation       Cholecalciferol (VITAMIN D3 PO) Take 2,000 Units by mouth daily        DULoxetine (CYMBALTA) 20 MG capsule Take 20 mg by mouth 2 times daily       folic acid (FOLVITE) 1 MG tablet Take 1 tablet (1 mg) by mouth daily       Infant Care Products ([employer]S  BABY SHAMPOO EX) Apply to both eyes topically one time a day for eye drainage Use warm wash cloth and wipe eyes       Infant Care Products (JOH[employer]ONS BABY SHAMPOO EX) Apply externally to both eyes as needed for eye drainage.       Memantine HCl (NAMENDA PO) Take 10 mg by mouth daily       miconazole with skin protectant (LOPEZ ANTIFUNGAL) 2 % CREA cream Apply topically every 8 hours Apply to buttocks topically every shift for barrier cream with each toileting       phenylephrine-shark liver oil-mineral oil-petrolatum (PREPARATION H) 0.25-14-74.9 % rectal ointment Place rectally daily as needed for hemorrhoids       senna-docusate (SENOKOT-S;PERICOLACE) 8.6-50 MG per tablet Take 1 tablet by mouth every 12 hours as needed for constipation       sodium chloride (OCEAN) 0.65 % nasal spray Spray 2 sprays into both nostrils 2 times daily , at 0900 & 2100       sodium chloride (OCEAN) 0.65 % nasal spray Spray 2 sprays into both nostrils every 8 hours as needed for congestion       tamsulosin (FLOMAX) 0.4 MG capsule Take 0.4 mg by mouth At Bedtime         REVIEW OF SYSTEMS:  Unobtainable secondary to cognitive impairment.     Objective:  /64   Pulse 65   Temp 98.2  F (36.8  C)   Resp 18   Wt 71.6 kg (157 lb 12.8 oz)   SpO2 92%   BMI 21.40 kg/m     Exam:  GENERAL APPEARANCE:  resting in bed, no conversation, in no distress, cooperative, thin, calm   ENT:  moist mucous membranes  EYES:  EOM normal, conjunctiva and lids normal  RESP:  lungs clear to auscultation, no respiratory distress  CV:  RRR, no murmur  ABDOMEN:  no guarding or rebound, bowel sounds normal, soft, non-tender  M/S:   decreased muscle tone, no edema  Skin: thin- wearing sleeve protectors on arms & heel protectors   PSYCH:  insight and judgement impaired, memory impaired, affect and mood normal    Labs:     Most Recent 3 CBC's:  Recent Labs   Lab Test 11/05/19 07/18/19 05/20/19  0701 05/19/19  1810 05/12/17  0637   WBC  --   --  9.8 11.6* 9.4   HGB  10.2* 9.3* 10.6* 12.7* 8.5*   MCV  --   --  103* 103* 98   PLT  --   --  281 373 375     Most Recent 3 BMP's:  Recent Labs   Lab Test 05/20/19  0701 05/19/19  1810 05/12/17  0637    139 139   POTASSIUM 3.6 4.0 3.5   CHLORIDE 108 105 105   CO2 28 29 28   BUN 16 18 13   CR 0.77 0.82 0.98   ANIONGAP 5 5 6   ARANZA 8.0* 8.9 8.6   GLC 89 93 96       ASSESSMENT/PLAN:  (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  (primary encounter diagnosis)  Comment: chronic, difficult time assisting with cares has improved, patient appears more comfortable & alert however daughter feels patient less alert & less conversational   Plan:   --slowly decrease Baclofen dose- discussed with staff nurse & daughter to monitor for increase in spasticity &/or increase in alertness; if tolerates, will consider further dose reduction   --continue Tylenol BID dosing  --monitor for adverse effects     (G30.1,  F02.81) Late onset Alzheimer's disease with behavioral disturbance  Comment: no recent behavioral concerns, not on antipsychotic   Plan:   --monitor for increase in behaviors or change in sleep & eating patterns  --continues to be appropriate for secure Memory Care Unit  --continue Namenda & duloxetine      (D64.9) Anemia   (E53.8) Folate deficiency   Comment:  Hgb improved 10.2 <-- 9.3; no evidence of bleed; normal iron, low folate, elevated B12  Plan:  --continue folic acid 1mg PO daily  --CBC & folate prn     (R35.0) Urinary frequency   Comment: started on Flomax for urinary frequency; fully incontinent of bowel & bladder  Plan:   --discontinue Flomax  --monitor for urinary symptoms     Orders written by provider at facility  1. Decrease Baclofen to TID: 5mg/2.5mg/5mg  2. Discontinue prn Preparation H d/t non use  3. Discontinue Flomax  4. Discontinue prn bisacodyl d/t non use    Total time spent with patient visit at the [facility] was 39 minutes including including patient visit, review of past records & discussions about the POC. Greater than 50%  of total time spent time on patient's counseling and coordinating care regarding conditions and treatment options as mentioned above.  **called daughter to discuss above med changes, progression of disease & recent changes including decrease in conversation, increased fatigue. Discussed possible GDR Cymbalta following titration of Baclofen.     Electronically signed by:  Penelope Seo NP               Sincerely,        Penelope Seo NP

## 2019-11-20 NOTE — PROGRESS NOTES
[facility]  [place] Record Number:  [id number]  Place of Service where encounter took place:  [facility] ([facility]) [[id number]]  Chief Complaint   Patient presents with     RECHECK     med review + skin issue       HPI:    Eder Pearson  is a 86 year old (7/9/1933), who is being seen today for an episodic care visit.  HPI information obtained from: facility chart records, facility staff, patient report, [facility] chart review and family/first contact daughter report.     Today's concern is:  Staff requesting med review, TOSHIA Martino. Patient currently resting in bed, on his side, without complaint. Staff without acute concerns.   Wife feels patient has appeared more comfortable & alert with her visits however she has concerns about patient seeming less alert & engaging in less conversation during her visits. Fair appetite.   History of aspiration pneumonia requiring supplemental O2. Have been able to successfully wean off O2, keeping sats 90-96% on RA. No reports of SOB, dyspnea, or cough. Remains on dysphagia diet without evidence of recent aspiration.   -130 x1 month with HR 58-80. Weight 157 on 11/17; 164lbs 05/2019.    Past Medical and Surgical History reviewed in Epic today.    MEDICATIONS:  Current Outpatient Medications   Medication Sig Dispense Refill     acetaminophen (TYLENOL) 500 MG tablet Take 1,000 mg by mouth 2 times daily And 1000 mg 8 hours daily as needed       Baclofen 5 MG TABS Take 5 mg by mouth 3 times daily       bisacodyl (DULCOLAX) 10 MG Suppository Place 10 mg rectally daily as needed for constipation       Cholecalciferol (VITAMIN D3 PO) Take 2,000 Units by mouth daily        DULoxetine (CYMBALTA) 20 MG capsule Take 20 mg by mouth 2 times daily       folic acid (FOLVITE) 1 MG tablet Take 1 tablet (1 mg) by mouth daily       Infant Care Products ([employer] BABY SHAMPOO EX) Apply to both eyes topically one time a day for eye drainage Use warm wash  cloth and wipe eyes       Infant Care Products ([employer] BABY SHAMPOO EX) Apply externally to both eyes as needed for eye drainage.       Memantine HCl (NAMENDA PO) Take 10 mg by mouth daily       miconazole with skin protectant (LOPEZ ANTIFUNGAL) 2 % CREA cream Apply topically every 8 hours Apply to buttocks topically every shift for barrier cream with each toileting       phenylephrine-shark liver oil-mineral oil-petrolatum (PREPARATION H) 0.25-14-74.9 % rectal ointment Place rectally daily as needed for hemorrhoids       senna-docusate (SENOKOT-S;PERICOLACE) 8.6-50 MG per tablet Take 1 tablet by mouth every 12 hours as needed for constipation       sodium chloride (OCEAN) 0.65 % nasal spray Spray 2 sprays into both nostrils 2 times daily , at 0900 & 2100       sodium chloride (OCEAN) 0.65 % nasal spray Spray 2 sprays into both nostrils every 8 hours as needed for congestion       tamsulosin (FLOMAX) 0.4 MG capsule Take 0.4 mg by mouth At Bedtime         REVIEW OF SYSTEMS:  Unobtainable secondary to cognitive impairment.     Objective:  /64   Pulse 65   Temp 98.2  F (36.8  C)   Resp 18   Wt 71.6 kg (157 lb 12.8 oz)   SpO2 92%   BMI 21.40 kg/m    Exam:  GENERAL APPEARANCE:  resting in bed, no conversation, in no distress, cooperative, thin, calm   ENT:  moist mucous membranes  EYES:  EOM normal, conjunctiva and lids normal  RESP:  lungs clear to auscultation, no respiratory distress  CV:  RRR, no murmur  ABDOMEN:  no guarding or rebound, bowel sounds normal, soft, non-tender  M/S:   decreased muscle tone, no edema  Skin: thin- wearing sleeve protectors on arms & heel protectors   PSYCH:  insight and judgement impaired, memory impaired, affect and mood normal    Labs:     Most Recent 3 CBC's:  Recent Labs   Lab Test 11/05/19 07/18/19 05/20/19  0701 05/19/19  1810 05/12/17  0637   WBC  --   --  9.8 11.6* 9.4   HGB 10.2* 9.3* 10.6* 12.7* 8.5*   MCV  --   --  103* 103* 98   PLT  --   --  281 373 375     Most  Recent 3 BMP's:  Recent Labs   Lab Test 05/20/19  0701 05/19/19  1810 05/12/17  0637    139 139   POTASSIUM 3.6 4.0 3.5   CHLORIDE 108 105 105   CO2 28 29 28   BUN 16 18 13   CR 0.77 0.82 0.98   ANIONGAP 5 5 6   ARANZA 8.0* 8.9 8.6   GLC 89 93 96       ASSESSMENT/PLAN:  (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  (primary encounter diagnosis)  Comment: chronic, difficult time assisting with cares has improved, patient appears more comfortable & alert however daughter feels patient less alert & less conversational   Plan:   --slowly decrease Baclofen dose- discussed with staff nurse & daughter to monitor for increase in spasticity &/or increase in alertness; if tolerates, will consider further dose reduction   --continue Tylenol BID dosing  --monitor for adverse effects     (G30.1,  F02.81) Late onset Alzheimer's disease with behavioral disturbance  Comment: no recent behavioral concerns, not on antipsychotic   Plan:   --monitor for increase in behaviors or change in sleep & eating patterns  --continues to be appropriate for [facility]  --continue Namenda & duloxetine      (D64.9) Anemia   (E53.8) Folate deficiency   Comment:  Hgb improved 10.2 <-- 9.3; no evidence of bleed; normal iron, low folate, elevated B12  Plan:  --continue folic acid 1mg PO daily  --CBC & folate prn     (R35.0) Urinary frequency   Comment: started on Flomax for urinary frequency; fully incontinent of bowel & bladder  Plan:   --discontinue Flomax  --monitor for urinary symptoms     Orders written by provider at facility  1. Decrease Baclofen to TID: 5mg/2.5mg/5mg  2. Discontinue prn Preparation H d/t non use  3. Discontinue Flomax  4. Discontinue prn bisacodyl d/t non use    Total time spent with patient visit at the skilled nursing facility was 39 minutes including including patient visit, review of past records & discussions about the POC. Greater than 50% of total time spent time on patient's counseling and coordinating care regarding conditions  and treatment options as mentioned above.  **called daughter to discuss above med changes, progression of disease & recent changes including decrease in conversation, increased fatigue. Discussed possible GDR Cymbalta following titration of Baclofen.     Electronically signed by:  Penelope Seo NP

## 2019-12-09 NOTE — PROGRESS NOTES
Patient was seen for regulatory long-term care visit      Patient is in the midst of a slow taper of baclofen as part of a gradual dose reduction plan.  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  There have been no recent episodes of clinically significant aspiration.  He remains on dysphagia diet.      Exam  Lying in bed, sleepy, opens eyes to name, nonverbal  Thin appearing  Lungs clear  CV regular rhythm  Abdomen soft  Generalized rigidity      Assessment    Dementia, advanced  Dysphasia with history of aspiration pneumonia, currently stable  Generalized rigidity secondary to dementia.  Anemia, likely secondary chronic illness,   stable    Plan  Monitor response to gradual reduction in baclofen  Monitor for recurrent aspiration.

## 2022-10-27 NOTE — PLAN OF CARE
Problem: Goal Outcome Summary  Goal: Goal Outcome Summary  Outcome: No Change  Areas of concern: more alert today but still fairly lethargic. Seroquel held d/t sleepiness. He has not been combative but is still very resistant with cares and grabs onto staff strongly.      Areas of improvement: more alert. Eating well with assistance from staff or daughter. Urine culture negative.      Plan: Remains on 2 L O2 and IV unasyn.        (V5) oriented